# Patient Record
Sex: MALE | Race: WHITE | NOT HISPANIC OR LATINO | Employment: OTHER | URBAN - METROPOLITAN AREA
[De-identification: names, ages, dates, MRNs, and addresses within clinical notes are randomized per-mention and may not be internally consistent; named-entity substitution may affect disease eponyms.]

---

## 2017-02-23 ENCOUNTER — ALLSCRIPTS OFFICE VISIT (OUTPATIENT)
Dept: OTHER | Facility: OTHER | Age: 62
End: 2017-02-23

## 2017-07-27 ENCOUNTER — HOSPITAL ENCOUNTER (EMERGENCY)
Facility: HOSPITAL | Age: 62
Discharge: HOME | End: 2017-07-27
Payer: MEDICARE

## 2017-07-27 ENCOUNTER — APPOINTMENT (EMERGENCY)
Dept: RADIOLOGY | Facility: HOSPITAL | Age: 62
End: 2017-07-27
Payer: MEDICARE

## 2017-07-27 VITALS
RESPIRATION RATE: 18 BRPM | TEMPERATURE: 97.6 F | SYSTOLIC BLOOD PRESSURE: 145 MMHG | HEART RATE: 68 BPM | DIASTOLIC BLOOD PRESSURE: 87 MMHG | OXYGEN SATURATION: 100 % | HEIGHT: 73 IN | WEIGHT: 261 LBS | BODY MASS INDEX: 34.59 KG/M2

## 2017-07-27 DIAGNOSIS — M25.532 LEFT WRIST PAIN: Primary | ICD-10-CM

## 2017-07-27 LAB
ALBUMIN SERPL BCP-MCNC: 3.9 G/DL (ref 3.5–5)
ALP SERPL-CCNC: 101 U/L (ref 46–116)
ALT SERPL W P-5'-P-CCNC: 19 U/L (ref 12–78)
ANION GAP SERPL CALCULATED.3IONS-SCNC: 7 MMOL/L (ref 4–13)
AST SERPL W P-5'-P-CCNC: 18 U/L (ref 5–45)
BASOPHILS # BLD AUTO: 0.02 THOUSANDS/ΜL (ref 0–0.1)
BASOPHILS NFR BLD AUTO: 0 % (ref 0–1)
BILIRUB SERPL-MCNC: 0.4 MG/DL (ref 0.2–1)
BUN SERPL-MCNC: 15 MG/DL (ref 5–25)
CALCIUM SERPL-MCNC: 8.9 MG/DL (ref 8.3–10.1)
CHLORIDE SERPL-SCNC: 105 MMOL/L (ref 100–108)
CO2 SERPL-SCNC: 28 MMOL/L (ref 21–32)
CREAT SERPL-MCNC: 0.98 MG/DL (ref 0.6–1.3)
DEPRECATED D DIMER PPP: 457 NG/ML (FEU) (ref 0–424)
EOSINOPHIL # BLD AUTO: 0.09 THOUSAND/ΜL (ref 0–0.61)
EOSINOPHIL NFR BLD AUTO: 1 % (ref 0–6)
ERYTHROCYTE [DISTWIDTH] IN BLOOD BY AUTOMATED COUNT: 13.3 % (ref 11.6–15.1)
GFR SERPL CREATININE-BSD FRML MDRD: 83 ML/MIN/1.73SQ M
GLUCOSE SERPL-MCNC: 92 MG/DL (ref 65–140)
HCT VFR BLD AUTO: 44.4 % (ref 36.5–49.3)
HGB BLD-MCNC: 15.3 G/DL (ref 12–17)
LYMPHOCYTES # BLD AUTO: 2.17 THOUSANDS/ΜL (ref 0.6–4.47)
LYMPHOCYTES NFR BLD AUTO: 22 % (ref 14–44)
MCH RBC QN AUTO: 32.6 PG (ref 26.8–34.3)
MCHC RBC AUTO-ENTMCNC: 34.5 G/DL (ref 31.4–37.4)
MCV RBC AUTO: 95 FL (ref 82–98)
MONOCYTES # BLD AUTO: 1.11 THOUSAND/ΜL (ref 0.17–1.22)
MONOCYTES NFR BLD AUTO: 11 % (ref 4–12)
NEUTROPHILS # BLD AUTO: 6.4 THOUSANDS/ΜL (ref 1.85–7.62)
NEUTS SEG NFR BLD AUTO: 66 % (ref 43–75)
PLATELET # BLD AUTO: 192 THOUSANDS/UL (ref 149–390)
PMV BLD AUTO: 10.1 FL (ref 8.9–12.7)
POTASSIUM SERPL-SCNC: 4.2 MMOL/L (ref 3.5–5.3)
PROT SERPL-MCNC: 7.5 G/DL (ref 6.4–8.2)
RBC # BLD AUTO: 4.7 MILLION/UL (ref 3.88–5.62)
SODIUM SERPL-SCNC: 140 MMOL/L (ref 136–145)
URATE SERPL-MCNC: 6.4 MG/DL (ref 4.2–8)
WBC # BLD AUTO: 9.79 THOUSAND/UL (ref 4.31–10.16)

## 2017-07-27 PROCEDURE — 85025 COMPLETE CBC W/AUTO DIFF WBC: CPT | Performed by: PHYSICIAN ASSISTANT

## 2017-07-27 PROCEDURE — 85379 FIBRIN DEGRADATION QUANT: CPT | Performed by: PHYSICIAN ASSISTANT

## 2017-07-27 PROCEDURE — 86618 LYME DISEASE ANTIBODY: CPT | Performed by: PHYSICIAN ASSISTANT

## 2017-07-27 PROCEDURE — 99283 EMERGENCY DEPT VISIT LOW MDM: CPT

## 2017-07-27 PROCEDURE — 73110 X-RAY EXAM OF WRIST: CPT

## 2017-07-27 PROCEDURE — 80053 COMPREHEN METABOLIC PANEL: CPT | Performed by: PHYSICIAN ASSISTANT

## 2017-07-27 PROCEDURE — 84550 ASSAY OF BLOOD/URIC ACID: CPT | Performed by: PHYSICIAN ASSISTANT

## 2017-07-27 PROCEDURE — 36415 COLL VENOUS BLD VENIPUNCTURE: CPT | Performed by: PHYSICIAN ASSISTANT

## 2017-07-27 RX ORDER — LEVOTHYROXINE SODIUM 0.15 MG/1
150 TABLET ORAL DAILY
COMMUNITY

## 2017-07-27 RX ORDER — ATORVASTATIN CALCIUM 80 MG/1
80 TABLET, FILM COATED ORAL DAILY
COMMUNITY
End: 2018-08-09 | Stop reason: ALTCHOICE

## 2017-07-27 RX ORDER — DIAZEPAM 10 MG/1
5 TABLET ORAL DAILY
COMMUNITY
End: 2018-02-16 | Stop reason: SDUPTHER

## 2017-07-27 RX ORDER — PROPRANOLOL HYDROCHLORIDE 80 MG/1
80 TABLET ORAL DAILY
COMMUNITY

## 2017-07-28 LAB
B BURGDOR IGG SER IA-ACNC: 0.14
B BURGDOR IGM SER IA-ACNC: 0.21

## 2017-08-24 ENCOUNTER — ALLSCRIPTS OFFICE VISIT (OUTPATIENT)
Dept: OTHER | Facility: OTHER | Age: 62
End: 2017-08-24

## 2018-01-03 ENCOUNTER — OFFICE VISIT (OUTPATIENT)
Dept: URGENT CARE | Facility: CLINIC | Age: 63
End: 2018-01-03
Payer: MEDICARE

## 2018-01-03 ENCOUNTER — HOSPITAL ENCOUNTER (EMERGENCY)
Facility: HOSPITAL | Age: 63
Discharge: HOME | End: 2018-01-03
Attending: EMERGENCY MEDICINE | Admitting: EMERGENCY MEDICINE
Payer: MEDICARE

## 2018-01-03 VITALS
HEART RATE: 79 BPM | OXYGEN SATURATION: 95 % | RESPIRATION RATE: 18 BRPM | TEMPERATURE: 98.9 F | WEIGHT: 240 LBS | BODY MASS INDEX: 31.66 KG/M2 | DIASTOLIC BLOOD PRESSURE: 64 MMHG | SYSTOLIC BLOOD PRESSURE: 108 MMHG

## 2018-01-03 DIAGNOSIS — J11.1 INFLUENZA-LIKE ILLNESS: Primary | ICD-10-CM

## 2018-01-03 LAB
ANION GAP SERPL CALCULATED.3IONS-SCNC: 5 MMOL/L (ref 4–13)
BASOPHILS # BLD AUTO: 0.01 THOUSANDS/ΜL (ref 0–0.1)
BASOPHILS NFR BLD AUTO: 0 % (ref 0–1)
BUN SERPL-MCNC: 13 MG/DL (ref 5–25)
CALCIUM SERPL-MCNC: 8.9 MG/DL (ref 8.3–10.1)
CHLORIDE SERPL-SCNC: 105 MMOL/L (ref 100–108)
CLARITY, POC: CLEAR
CO2 SERPL-SCNC: 32 MMOL/L (ref 21–32)
COLOR, POC: YELLOW
CREAT SERPL-MCNC: 1.15 MG/DL (ref 0.6–1.3)
EOSINOPHIL # BLD AUTO: 0.06 THOUSAND/ΜL (ref 0–0.61)
EOSINOPHIL NFR BLD AUTO: 1 % (ref 0–6)
ERYTHROCYTE [DISTWIDTH] IN BLOOD BY AUTOMATED COUNT: 13.4 % (ref 11.6–15.1)
EXT BILIRUBIN, UA: NORMAL
EXT BLOOD URINE: NORMAL
EXT GLUCOSE, UA: NORMAL
EXT KETONES: NORMAL
EXT NITRITE, UA: NORMAL
EXT PH, UA: 6
EXT PROTEIN, UA: NORMAL
EXT SPECIFIC GRAVITY, UA: 1.01
EXT UROBILINOGEN: NORMAL
GFR SERPL CREATININE-BSD FRML MDRD: 69 ML/MIN/1.73SQ M
GLUCOSE SERPL-MCNC: 120 MG/DL (ref 65–140)
HCT VFR BLD AUTO: 43.9 % (ref 36.5–49.3)
HGB BLD-MCNC: 14.8 G/DL (ref 12–17)
LYMPHOCYTES # BLD AUTO: 1.04 THOUSANDS/ΜL (ref 0.6–4.47)
LYMPHOCYTES NFR BLD AUTO: 14 % (ref 14–44)
MCH RBC QN AUTO: 32.3 PG (ref 26.8–34.3)
MCHC RBC AUTO-ENTMCNC: 33.7 G/DL (ref 31.4–37.4)
MCV RBC AUTO: 96 FL (ref 82–98)
MONOCYTES # BLD AUTO: 1.34 THOUSAND/ΜL (ref 0.17–1.22)
MONOCYTES NFR BLD AUTO: 17 % (ref 4–12)
NEUTROPHILS # BLD AUTO: 5.28 THOUSANDS/ΜL (ref 1.85–7.62)
NEUTS SEG NFR BLD AUTO: 68 % (ref 43–75)
PLATELET # BLD AUTO: 159 THOUSANDS/UL (ref 149–390)
PMV BLD AUTO: 10.1 FL (ref 8.9–12.7)
POTASSIUM SERPL-SCNC: 4.1 MMOL/L (ref 3.5–5.3)
RBC # BLD AUTO: 4.58 MILLION/UL (ref 3.88–5.62)
SODIUM SERPL-SCNC: 142 MMOL/L (ref 136–145)
WBC # BLD AUTO: 7.73 THOUSAND/UL (ref 4.31–10.16)
WBC # BLD EST: NORMAL 10*3/UL

## 2018-01-03 PROCEDURE — 99213 OFFICE O/P EST LOW 20 MIN: CPT

## 2018-01-03 PROCEDURE — 99283 EMERGENCY DEPT VISIT LOW MDM: CPT

## 2018-01-03 PROCEDURE — 81002 URINALYSIS NONAUTO W/O SCOPE: CPT | Performed by: EMERGENCY MEDICINE

## 2018-01-03 PROCEDURE — 87798 DETECT AGENT NOS DNA AMP: CPT | Performed by: EMERGENCY MEDICINE

## 2018-01-03 PROCEDURE — 94640 AIRWAY INHALATION TREATMENT: CPT

## 2018-01-03 PROCEDURE — 96361 HYDRATE IV INFUSION ADD-ON: CPT

## 2018-01-03 PROCEDURE — 36415 COLL VENOUS BLD VENIPUNCTURE: CPT | Performed by: EMERGENCY MEDICINE

## 2018-01-03 PROCEDURE — 85025 COMPLETE CBC W/AUTO DIFF WBC: CPT | Performed by: EMERGENCY MEDICINE

## 2018-01-03 PROCEDURE — 96374 THER/PROPH/DIAG INJ IV PUSH: CPT

## 2018-01-03 PROCEDURE — 80048 BASIC METABOLIC PNL TOTAL CA: CPT | Performed by: EMERGENCY MEDICINE

## 2018-01-03 PROCEDURE — G0463 HOSPITAL OUTPT CLINIC VISIT: HCPCS

## 2018-01-03 RX ORDER — ATORVASTATIN CALCIUM 80 MG/1
1 TABLET, FILM COATED ORAL DAILY
COMMUNITY
End: 2018-01-03 | Stop reason: SDUPTHER

## 2018-01-03 RX ORDER — ALBUTEROL SULFATE 2.5 MG/3ML
5 SOLUTION RESPIRATORY (INHALATION) ONCE
Status: COMPLETED | OUTPATIENT
Start: 2018-01-03 | End: 2018-01-03

## 2018-01-03 RX ORDER — KETOROLAC TROMETHAMINE 30 MG/ML
15 INJECTION, SOLUTION INTRAMUSCULAR; INTRAVENOUS ONCE
Status: COMPLETED | OUTPATIENT
Start: 2018-01-03 | End: 2018-01-03

## 2018-01-03 RX ORDER — LEVOTHYROXINE SODIUM 0.12 MG/1
1 TABLET ORAL DAILY
COMMUNITY
End: 2018-01-03 | Stop reason: SDUPTHER

## 2018-01-03 RX ORDER — PROPRANOLOL HYDROCHLORIDE 80 MG/1
TABLET ORAL DAILY
COMMUNITY
End: 2018-01-03 | Stop reason: SDUPTHER

## 2018-01-03 RX ORDER — DIAZEPAM 10 MG/1
1 TABLET ORAL DAILY
COMMUNITY
End: 2018-01-03 | Stop reason: SDUPTHER

## 2018-01-03 RX ADMIN — IPRATROPIUM BROMIDE 0.5 MG: 0.5 SOLUTION RESPIRATORY (INHALATION) at 20:36

## 2018-01-03 RX ADMIN — SODIUM CHLORIDE 1000 ML: 0.9 INJECTION, SOLUTION INTRAVENOUS at 20:31

## 2018-01-03 RX ADMIN — ALBUTEROL SULFATE 5 MG: 2.5 SOLUTION RESPIRATORY (INHALATION) at 20:36

## 2018-01-03 RX ADMIN — KETOROLAC TROMETHAMINE 15 MG: 30 INJECTION, SOLUTION INTRAMUSCULAR at 20:35

## 2018-01-04 LAB
FLUAV AG SPEC QL: ABNORMAL
FLUBV AG SPEC QL: DETECTED
RSV B RNA SPEC QL NAA+PROBE: ABNORMAL

## 2018-01-04 NOTE — DISCHARGE INSTRUCTIONS
Influenza   WHAT YOU NEED TO KNOW:   Influenza (the flu) is an infection caused by the influenza virus  The flu is easily spread when an infected person coughs, sneezes, or has close contact with others  You may be able to spread the flu to others for 1 week or longer after signs or symptoms appear  DISCHARGE INSTRUCTIONS:   Call 911 for any of the following:   · You have trouble breathing, and your lips look purple or blue  · You have a seizure  Return to the emergency department if:   · You are dizzy, or you are urinating less or not at all  · You have a headache with a stiff neck, and you feel tired or confused  · You have new pain or pressure in your chest     · Your symptoms, such as shortness of breath, vomiting, or diarrhea, get worse  · Your symptoms, such as fever and coughing, seem to get better, but then get worse  Contact your healthcare provider if:   · You have new muscle pain or weakness  · You have questions or concerns about your condition or care  Medicines: You may need any of the following:  · Acetaminophen  decreases pain and fever  It is available without a doctor's order  Ask how much to take and how often to take it  Follow directions  Acetaminophen can cause liver damage if not taken correctly  · NSAIDs , such as ibuprofen, help decrease swelling, pain, and fever  This medicine is available with or without a doctor's order  NSAIDs can cause stomach bleeding or kidney problems in certain people  If you take blood thinner medicine, always ask your healthcare provider if NSAIDs are safe for you  Always read the medicine label and follow directions  · Antivirals  help fight a viral infection  · Take your medicine as directed  Contact your healthcare provider if you think your medicine is not helping or if you have side effects  Tell him or her if you are allergic to any medicine  Keep a list of the medicines, vitamins, and herbs you take   Include the amounts, and when and why you take them  Bring the list or the pill bottles to follow-up visits  Carry your medicine list with you in case of an emergency  Rest  as much as you can to help you recover  Drink liquids as directed  to help prevent dehydration  Ask how much liquid to drink each day and which liquids are best for you  Prevent the spread of influenza:   · Wash your hands often  Use soap and water  Wash your hands after you use the bathroom, change a child's diapers, or sneeze  Wash your hands before you prepare or eat food  Use gel hand cleanser when soap and water are not available  Do not touch your eyes, nose, or mouth unless you have washed your hands first            · Cover your mouth when you sneeze or cough  Cough into a tissue or the bend of your arm  · Clean shared items with a germ-killing   Clean table surfaces, doorknobs, and light switches  Do not share towels, silverware, and dishes with people who are sick  Wash bed sheets, towels, silverware, and dishes with soap and water  · Wear a mask  over your mouth and nose if you are sick or are near anyone who is sick  · Stay away from others  if you are sick  · Influenza vaccine  helps prevent influenza (flu)  Everyone older than 6 months should get a yearly influenza vaccine  Get the vaccine as soon as it is available, usually in September or October each year  Follow up with your healthcare provider as directed:  Write down your questions so you remember to ask them during your visits  © 2017 [employer] [employer] Information is for End User's use only and may not be sold, redistributed or otherwise used for commercial purposes  All illustrations and images included in CareNotes® are the copyrighted property of [employer] , [employer]  or [employer]  The above information is an  only  It is not intended as medical advice for individual conditions or treatments   Talk to your doctor, nurse or pharmacist before following any medical regimen to see if it is safe and effective for you

## 2018-01-04 NOTE — ED PROVIDER NOTES
History  Chief Complaint   Patient presents with    URI     Cough, body aches, chest congestion, nasal congestion, sore throat, coughing fits causing SOB since yesterday  Seen at [facility] earlier today and dx with viral syndrome  This 82-year-old man with history of essential tremor with deep brain stimulator, level IB melanoma excision 5 years ago, in remission, hyperlipidemia, thyroid disease and obesity complains of sudden onset fever, myalgia, cephalgia, sore throat and cough productive of clear sputum since yesterday  Prior to Admission Medications   Prescriptions Last Dose Informant Patient Reported? Taking? albuterol (5 mg/mL) 0 5 % nebulizer solution   Yes No   Sig: Take 2 5 mg by nebulization every 6 (six) hours as needed for wheezing   atorvastatin (LIPITOR) 80 mg tablet   Yes No   Sig: Take 80 mg by mouth daily   diazepam (VALIUM) 10 mg tablet   Yes No   Sig: Take 5 mg by mouth daily   levothyroxine 150 mcg tablet   Yes No   Sig: Take 150 mcg by mouth daily   propranolol (INDERAL) 80 mg tablet   Yes No   Sig: Take 80 mg by mouth daily      Facility-Administered Medications: None       Past Medical History:   Diagnosis Date    Disease of thyroid gland     Enlarged prostate     Hemorrhoids     Hyperlipidemia     Melanoma ([address] )     Obesity     Tremor        Past Surgical History:   Procedure Laterality Date    INSERTION / PLACEMENT / REVISION NEUROSTIMULATOR      SKIN SURGERY         History reviewed  No pertinent family history  I have reviewed and agree with the history as documented  Social History   Substance Use Topics    Smoking status: Never Smoker    Smokeless tobacco: Not on file    Alcohol use Yes      Comment: social        Review of Systems   Constitutional:        See  HPI   HENT: Positive for congestion, rhinorrhea and sore throat  Negative for drooling, ear discharge, ear pain, facial swelling, hearing loss, nosebleeds and trouble swallowing      Eyes: Negative  Respiratory: Positive for cough and shortness of breath  Negative for wheezing and stridor  Cardiovascular: Negative  Gastrointestinal: Negative  Endocrine: Negative  Genitourinary: Negative  Musculoskeletal: Positive for myalgias  Skin: Negative  Neurological: Positive for tremors  Negative for dizziness, seizures, weakness, light-headedness, numbness and headaches  Hematological: Negative  Psychiatric/Behavioral: Negative  Physical Exam  ED Triage Vitals   Temperature Pulse Respirations Blood Pressure SpO2   01/03/18 2052 01/03/18 2030 01/03/18 2052 01/03/18 2030 01/03/18 2030   98 9 °F (37 2 °C) 89 18 128/61 95 %      Temp Source Heart Rate Source Patient Position - Orthostatic VS BP Location FiO2 (%)   01/03/18 2052 01/03/18 2052 01/03/18 2052 01/03/18 2052 --   Temporal Monitor Sitting Right arm       Pain Score       01/03/18 2052       6           Orthostatic Vital Signs  Vitals:    01/03/18 2030 01/03/18 2045 01/03/18 2052 01/03/18 2100   BP: 128/61  128/61 123/62   Pulse: 89 85 84 85   Patient Position - Orthostatic VS:   Sitting Lying       Physical Exam   Constitutional: He is oriented to person, place, and time  He appears well-developed and well-nourished  No distress  HENT:   Head: Normocephalic and atraumatic  Right Ear: External ear normal    Left Ear: External ear normal    Mouth/Throat: Oropharynx is clear and moist    Eyes: Conjunctivae and EOM are normal  Pupils are equal, round, and reactive to light  Neck: Normal range of motion  Neck supple  No JVD present  Cardiovascular: Normal rate, regular rhythm, normal heart sounds and intact distal pulses  No murmur heard  Pulmonary/Chest: Effort normal and breath sounds normal  No stridor  No respiratory distress  Abdominal: Soft  Bowel sounds are normal  He exhibits no mass  There is no tenderness  There is no rebound and no guarding  Musculoskeletal: Normal range of motion   He exhibits no edema or tenderness  Lymphadenopathy:     He has no cervical adenopathy  Neurological: He is alert and oriented to person, place, and time  He has normal reflexes  No cranial nerve deficit  Coordination normal    Skin: Skin is warm and dry  Capillary refill takes less than 2 seconds  No rash noted  He is not diaphoretic  Psychiatric: He has a normal mood and affect  His behavior is normal    Nursing note and vitals reviewed        ED Medications  Medications   sodium chloride 0 9 % bolus 1,000 mL (0 mL Intravenous Stopped 1/3/18 2209)   ketorolac (TORADOL) injection 15 mg (15 mg Intravenous Given 1/3/18 2035)   albuterol inhalation solution 5 mg (5 mg Nebulization Given 1/3/18 2036)   ipratropium (ATROVENT) 0 02 % inhalation solution 0 5 mg (0 5 mg Nebulization Given 1/3/18 2036)       Diagnostic Studies  Results Reviewed     Procedure Component Value Units Date/Time    POCT urinalysis dipstick [[id number]]  (Normal) Resulted:  01/03/18 2217    Lab Status:  Final result Specimen:  Urine Updated:  01/03/18 2217     Color, UA yellow     Clarity, UA clear     EXT Glucose, UA neg     EXT Bilirubin, UA (Ref: Negative) NEG     EXT Ketones, UA (Ref: Negative) NEG     EXT Spec Grav, UA 1 010     EXT Blood, UA (Ref: Negative) TRACE HEMOLYZED     EXT pH, UA 6 0     EXT Protein, UA (Ref: Negative) NEG     EXT Urobilinogen, UA (Ref: 0 2- 1 0) NEG     EXT Leukocytes, UA (Ref: Negative) NEG     EXT Nitrite, UA (Ref: Negative) NEG    Basic metabolic panel [[id number]] Collected:  01/03/18 2027    Lab Status:  Final result Specimen:  Blood from Arm, Left Updated:  01/03/18 2048     Sodium 142 mmol/L      Potassium 4 1 mmol/L      Chloride 105 mmol/L      CO2 32 mmol/L      Anion Gap 5 mmol/L      BUN 13 mg/dL      Creatinine 1 15 mg/dL      Glucose 120 mg/dL      Calcium 8 9 mg/dL      eGFR 69 ml/min/1 73sq m     Narrative:         National Kidney Disease Education Program recommendations are as follows:  GFR calculation is accurate only with a steady state creatinine  Chronic Kidney disease less than 60 ml/min/1 73 sq  meters  Kidney failure less than 15 ml/min/1 73 sq  meters  CBC and differential [[id number]]  (Abnormal) Collected:  01/03/18 2027    Lab Status:  Final result Specimen:  Blood from Arm, Left Updated:  01/03/18 2038     WBC 7 73 Thousand/uL      RBC 4 58 Million/uL      Hemoglobin 14 8 g/dL      Hematocrit 43 9 %      MCV 96 fL      MCH 32 3 pg      MCHC 33 7 g/dL      RDW 13 4 %      MPV 10 1 fL      Platelets 122 Thousands/uL      Neutrophils Relative 68 %      Lymphocytes Relative 14 %      Monocytes Relative 17 (H) %      Eosinophils Relative 1 %      Basophils Relative 0 %      Neutrophils Absolute 5 28 Thousands/µL      Lymphocytes Absolute 1 04 Thousands/µL      Monocytes Absolute 1 34 (H) Thousand/µL      Eosinophils Absolute 0 06 Thousand/µL      Basophils Absolute 0 01 Thousands/µL     Influenza A/B and RSV by PCR (indicated for patients >2 mo of age) [[id number]] Collected:  01/03/18 2027    Lab Status: In process Specimen:  Nasopharyngeal from Nasopharyngeal Swab Updated:  01/03/18 2034                 No orders to display              Procedures  Procedures       Phone Contacts  ED Phone Contact    ED Course  ED Course as of Jan 03 2225 Wed Jan 03, 2018 2209  Patient states he feels better  Lungs remain clear with good air entry bilaterally  Ambulating to the bathroom with no problem  Hemodynamically stable  2219   PATIENT FEELS BETTER  MUCH LESS COUGHING    I REVIEWED RESULTS IMPRESSION AND PLAN WITH THE PATIENT AND HIS WIFE                                 MDM  Number of Diagnoses or Management Options  Influenza-like illness: new and requires workup     Amount and/or Complexity of Data Reviewed  Clinical lab tests: ordered and reviewed      CritCare Time    Disposition  Final diagnoses:   Influenza-like illness     Time reflects when diagnosis was documented in both MDM as applicable and the Disposition within this note     Time User Action Codes Description Comment    1/3/2018 10:24 PM [address] Influenza-like illness       ED Disposition     ED Disposition Condition Comment    Discharge  Vonda  discharge to home/self care  Condition at discharge: Stable        Follow-up Information     Follow up With Specialties Details Why Contact Info    GARIMA Cordoba Nurse Practitioner In 1 week As needed [phone]  [phone]          Patient's Medications   Discharge Prescriptions    No medications on file     No discharge procedures on file      ED Provider  Electronically Signed by           Michael Hess DO  01/03/18 2226

## 2018-01-04 NOTE — PROGRESS NOTES
Assessment   1  Viral URI (465 9) (J06 9,B97 89)   2  Cough (786 2) (R05)    Plan   Cough    · Promethazine-Codeine 6 25-10 MG/5ML Oral Syrup; 2 teaspoons at bedtime as    needed for cough   · Levalbuterol HCl - 1 25 MG/3ML Inhalation Nebulization Solution    (Xopenex)   · Sodium Chloride 0 9 % Inhalation Nebulization Solution    Discussion/Summary   Discussion Summary:    Continue Flonase   cough medication as needed as needed   no improvement with nebulizer treatment which is not surprising given his cough is due to postnasal drip   follow-up with PCP  Medication Side Effects Reviewed: Possible side effects of new medications were reviewed with the patient/guardian today  Understands and agrees with treatment plan: The treatment plan was reviewed with the patient/guardian  The patient/guardian understands and agrees with the treatment plan      Chief Complaint   1  Cold Symptoms  Chief Complaint Free Text Note Form: Pt reports yesterday he developed a productive cough with clear sputum, sore throat, headache and chills  He used his albuterol inhaler without improvement  History of Present Illness   HPI: Patient began yesterday with increasing sinus congestion and dry persistent cough  He states he was not able to sleep last night due to the persistent coughing  No fever no chills, no chest tightness shortness of breath or wheezing  patient reports using a ProAir inhaler to help with his cough was not effective  He and his wife are requesting a neb treatment    Hospital Based Practices Required Assessment:      Pain Assessment      the patient states they do not have pain  Abuse And Domestic Violence Screen   Domestic violence screen not done today  Reason DV Screen not done: spouse present       Depression And Suicide Screen  Suicide screen not done today  -- Reason suicide screen not done: spouse present  Prefered Language is  [place]  Primary Language is  English        Review of Systems   Focused-Male:      Constitutional: no fever or chills, feels well, no tiredness, no recent weight loss or weight gain  ENT: as noted in HPI  Cardiovascular: no complaints of slow or fast heart rate, no chest pain, no palpitations, no leg claudication or lower extremity edema  Respiratory: cough, but-- as noted in HPI  Active Problems   1  Benign familial tremor (333 1) (G25 0)   2  Follow-up examination following surgery (V67 00) (Z09)   3  S/P deep brain stimulator placement (V45 89) (Z96 89)    Past Medical History   1  History of hypercholesterolemia (V12 29) (Z86 39)   2  History of hypothyroidism (V12 29) (Z86 39)    Family History   Mother    1  Family history of Mother  At Age ___  Father    2  Family history of Father  At Age ___    Social History    · Being A Social Drinker   · Living Independently With Spouse   · Marital History - Currently    · Never A Smoker   · Occupation:    Current Meds    1  Atorvastatin Calcium 80 MG Oral Tablet; TAKE 1 TABLET DAILY; Therapy: (Recorded:2014) to Recorded   2  DiazePAM 10 MG Oral Tablet; TAKE 1 TABLET DAILY; Last Rx:2017 Ordered   3  Levothyroxine Sodium 125 MCG Oral Tablet; TAKE 1 TABLET DAILY; Therapy: ([id number]) to Recorded   4  ProAir HFA AERS; Therapy: ([id number]) to Recorded   5  Propranolol HCl - 80 MG Oral Tablet; TAKE 1 PO QD; Therapy: (Recorded:2014) to Recorded    Allergies   1  Demerol SOLN    Vitals   Signs   Recorded: [id number] 09:05AM   Temperature: 98 1 F  Heart Rate: 78  Respiration: 16  Systolic: 952  Diastolic: 58  O2 Saturation: 96    Physical Exam        Constitutional      General appearance: No acute distress, well appearing and well nourished  Eyes      Conjunctiva and lids: No swelling, erythema, or discharge  Pupils and irises: Equal, round and reactive to light         Ears, Nose, Mouth, and Throat      External inspection of ears and nose: Normal        Otoscopic examination: Tympanic membrance translucent with normal light reflex  Canals patent without erythema  Nasal mucosa, septum, and turbinates: Abnormal  -- Mucosal erythema, edema and rhinorrhea  Oropharynx: Normal with no erythema, edema, exudate or lesions  Pulmonary      Respiratory effort: No increased work of breathing or signs of respiratory distress  Auscultation of lungs: Clear to auscultation  Cardiovascular      Auscultation of heart: Normal rate and rhythm, normal S1 and S2, without murmurs  Lymphatic      Palpation of lymph nodes in neck: No lymphadenopathy         Future Appointments      Date/Time Provider Specialty Site   05/31/2018 11:00 AM Amrita Arreaga MD Neurology [place] [id number]     Signatures    Electronically signed by : Kirt Sanchez DO; [place]  3 2018 12:11PM EST                       (Author)

## 2018-01-04 NOTE — ED NOTES
This patient was originally registered in error - pt was brought back directly from registration to the treatment area (triage had to then be redone on this new chart)     Maliha Hernandez RN  01/03/18 2056

## 2018-01-14 VITALS
HEIGHT: 73 IN | WEIGHT: 282 LBS | DIASTOLIC BLOOD PRESSURE: 72 MMHG | SYSTOLIC BLOOD PRESSURE: 128 MMHG | HEART RATE: 77 BPM | BODY MASS INDEX: 37.37 KG/M2

## 2018-01-23 VITALS
SYSTOLIC BLOOD PRESSURE: 110 MMHG | OXYGEN SATURATION: 96 % | DIASTOLIC BLOOD PRESSURE: 58 MMHG | RESPIRATION RATE: 16 BRPM | TEMPERATURE: 98.1 F | HEART RATE: 78 BPM

## 2018-02-16 DIAGNOSIS — G25.0 BENIGN FAMILIAL TREMOR: Primary | ICD-10-CM

## 2018-02-16 RX ORDER — DIAZEPAM 10 MG/1
10 TABLET ORAL DAILY
Qty: 90 TABLET | Refills: 0 | OUTPATIENT
Start: 2018-02-16 | End: 2018-05-21 | Stop reason: SDUPTHER

## 2018-05-21 DIAGNOSIS — G25.0 BENIGN FAMILIAL TREMOR: ICD-10-CM

## 2018-05-22 RX ORDER — DIAZEPAM 10 MG/1
10 TABLET ORAL DAILY
Qty: 90 TABLET | Refills: 0 | Status: SHIPPED | OUTPATIENT
Start: 2018-05-22 | End: 2018-08-22 | Stop reason: SDUPTHER

## 2018-07-17 ENCOUNTER — TELEPHONE (OUTPATIENT)
Dept: NEUROLOGY | Facility: CLINIC | Age: 63
End: 2018-07-17

## 2018-07-17 DIAGNOSIS — Z96.89 STATUS POST DEEP BRAIN STIMULATOR PLACEMENT: Primary | ICD-10-CM

## 2018-07-30 ENCOUNTER — APPOINTMENT (OUTPATIENT)
Dept: LAB | Facility: HOSPITAL | Age: 63
End: 2018-07-30
Attending: NEUROLOGICAL SURGERY
Payer: MEDICARE

## 2018-07-30 ENCOUNTER — HOSPITAL ENCOUNTER (OUTPATIENT)
Dept: NON INVASIVE DIAGNOSTICS | Facility: HOSPITAL | Age: 63
Discharge: HOME | End: 2018-07-30
Attending: NEUROLOGICAL SURGERY
Payer: MEDICARE

## 2018-07-30 ENCOUNTER — TELEPHONE (OUTPATIENT)
Dept: NEUROSURGERY | Facility: CLINIC | Age: 63
End: 2018-07-30

## 2018-07-30 ENCOUNTER — OFFICE VISIT (OUTPATIENT)
Dept: NEUROSURGERY | Facility: CLINIC | Age: 63
End: 2018-07-30
Payer: MEDICARE

## 2018-07-30 VITALS
SYSTOLIC BLOOD PRESSURE: 131 MMHG | TEMPERATURE: 97.2 F | RESPIRATION RATE: 16 BRPM | BODY MASS INDEX: 36.45 KG/M2 | WEIGHT: 275 LBS | HEIGHT: 73 IN | DIASTOLIC BLOOD PRESSURE: 77 MMHG | HEART RATE: 67 BPM

## 2018-07-30 DIAGNOSIS — G25.0 BENIGN ESSENTIAL TREMOR: ICD-10-CM

## 2018-07-30 DIAGNOSIS — Z96.89 STATUS POST DEEP BRAIN STIMULATOR PLACEMENT: ICD-10-CM

## 2018-07-30 DIAGNOSIS — G25.0 BENIGN ESSENTIAL TREMOR: Primary | ICD-10-CM

## 2018-07-30 LAB
ALBUMIN SERPL BCP-MCNC: 3.9 G/DL (ref 3.5–5)
ALP SERPL-CCNC: 85 U/L (ref 46–116)
ALT SERPL W P-5'-P-CCNC: 26 U/L (ref 12–78)
ANION GAP SERPL CALCULATED.3IONS-SCNC: 6 MMOL/L (ref 4–13)
APTT PPP: 25 SECONDS (ref 24–36)
AST SERPL W P-5'-P-CCNC: 22 U/L (ref 5–45)
ATRIAL RATE: 0 BPM
BASOPHILS # BLD AUTO: 0.03 THOUSANDS/ΜL (ref 0–0.1)
BASOPHILS NFR BLD AUTO: 0 % (ref 0–1)
BILIRUB SERPL-MCNC: 0.8 MG/DL (ref 0.2–1)
BILIRUB UR QL STRIP: NEGATIVE
BUN SERPL-MCNC: 16 MG/DL (ref 5–25)
CALCIUM SERPL-MCNC: 9.4 MG/DL (ref 8.3–10.1)
CHLORIDE SERPL-SCNC: 107 MMOL/L (ref 100–108)
CLARITY UR: CLEAR
CO2 SERPL-SCNC: 30 MMOL/L (ref 21–32)
COLOR UR: YELLOW
CREAT SERPL-MCNC: 0.98 MG/DL (ref 0.6–1.3)
EOSINOPHIL # BLD AUTO: 0.08 THOUSAND/ΜL (ref 0–0.61)
EOSINOPHIL NFR BLD AUTO: 1 % (ref 0–6)
ERYTHROCYTE [DISTWIDTH] IN BLOOD BY AUTOMATED COUNT: 13.1 % (ref 11.6–15.1)
EST. AVERAGE GLUCOSE BLD GHB EST-MCNC: 103 MG/DL
GFR SERPL CREATININE-BSD FRML MDRD: 83 ML/MIN/1.73SQ M
GLUCOSE P FAST SERPL-MCNC: 94 MG/DL (ref 65–99)
GLUCOSE UR STRIP-MCNC: NEGATIVE MG/DL
HBA1C MFR BLD: 5.2 % (ref 4.2–6.3)
HCT VFR BLD AUTO: 43.9 % (ref 36.5–49.3)
HGB BLD-MCNC: 14.5 G/DL (ref 12–17)
HGB UR QL STRIP.AUTO: NEGATIVE
IMM GRANULOCYTES # BLD AUTO: 0.03 THOUSAND/UL (ref 0–0.2)
IMM GRANULOCYTES NFR BLD AUTO: 0 % (ref 0–2)
INR PPP: 0.97 (ref 0.86–1.17)
KETONES UR STRIP-MCNC: NEGATIVE MG/DL
LEUKOCYTE ESTERASE UR QL STRIP: NEGATIVE
LYMPHOCYTES # BLD AUTO: 1.95 THOUSANDS/ΜL (ref 0.6–4.47)
LYMPHOCYTES NFR BLD AUTO: 25 % (ref 14–44)
MCH RBC QN AUTO: 31.9 PG (ref 26.8–34.3)
MCHC RBC AUTO-ENTMCNC: 33 G/DL (ref 31.4–37.4)
MCV RBC AUTO: 97 FL (ref 82–98)
MONOCYTES # BLD AUTO: 0.83 THOUSAND/ΜL (ref 0.17–1.22)
MONOCYTES NFR BLD AUTO: 11 % (ref 4–12)
NEUTROPHILS # BLD AUTO: 4.89 THOUSANDS/ΜL (ref 1.85–7.62)
NEUTS SEG NFR BLD AUTO: 63 % (ref 43–75)
NITRITE UR QL STRIP: NEGATIVE
NRBC BLD AUTO-RTO: 0 /100 WBCS
PH UR STRIP.AUTO: 5.5 [PH] (ref 4.5–8)
PLATELET # BLD AUTO: 211 THOUSANDS/UL (ref 149–390)
PMV BLD AUTO: 10 FL (ref 8.9–12.7)
POTASSIUM SERPL-SCNC: 4.9 MMOL/L (ref 3.5–5.3)
PROT SERPL-MCNC: 7.4 G/DL (ref 6.4–8.2)
PROT UR STRIP-MCNC: NEGATIVE MG/DL
PROTHROMBIN TIME: 12.3 SECONDS (ref 11.8–14.2)
QRS AXIS: -7 DEGREES
QRSD INTERVAL: 180 MS
QT INTERVAL: 540 MS
QTC INTERVAL: 530 MS
RBC # BLD AUTO: 4.55 MILLION/UL (ref 3.88–5.62)
SODIUM SERPL-SCNC: 143 MMOL/L (ref 136–145)
SP GR UR STRIP.AUTO: >=1.03 (ref 1–1.03)
T WAVE AXIS: 17 DEGREES
UROBILINOGEN UR QL STRIP.AUTO: 0.2 E.U./DL
VENTRICULAR RATE: 58 BPM
WBC # BLD AUTO: 7.81 THOUSAND/UL (ref 4.31–10.16)

## 2018-07-30 PROCEDURE — 85730 THROMBOPLASTIN TIME PARTIAL: CPT

## 2018-07-30 PROCEDURE — 83036 HEMOGLOBIN GLYCOSYLATED A1C: CPT

## 2018-07-30 PROCEDURE — 95978 PR ANALYZE NEUROSTIM BRAIN, FIRST 1H: CPT | Performed by: NEUROLOGICAL SURGERY

## 2018-07-30 PROCEDURE — 85025 COMPLETE CBC W/AUTO DIFF WBC: CPT

## 2018-07-30 PROCEDURE — 93010 ELECTROCARDIOGRAM REPORT: CPT | Performed by: INTERNAL MEDICINE

## 2018-07-30 PROCEDURE — 36415 COLL VENOUS BLD VENIPUNCTURE: CPT

## 2018-07-30 PROCEDURE — 93005 ELECTROCARDIOGRAM TRACING: CPT

## 2018-07-30 PROCEDURE — 81003 URINALYSIS AUTO W/O SCOPE: CPT | Performed by: NEUROLOGICAL SURGERY

## 2018-07-30 PROCEDURE — 99203 OFFICE O/P NEW LOW 30 MIN: CPT | Performed by: NEUROLOGICAL SURGERY

## 2018-07-30 PROCEDURE — 85610 PROTHROMBIN TIME: CPT

## 2018-07-30 PROCEDURE — 80053 COMPREHEN METABOLIC PANEL: CPT

## 2018-07-30 RX ORDER — ALBUTEROL SULFATE 90 UG/1
AEROSOL, METERED RESPIRATORY (INHALATION)
COMMUNITY
End: 2021-09-15

## 2018-07-30 NOTE — PROGRESS NOTES
Assessment/Plan:    No problem-specific Assessment & Plan notes found for this encounter  Diagnoses and all orders for this visit:    Benign essential tremor  -     Case request operating room: REPLACEMENT left chest IMPLANTABLE PULSE GENERATOR (IPG) DEEP BRAIN STIMULATION (DBS); Standing  -     Case request operating room: REPLACEMENT left chest IMPLANTABLE PULSE GENERATOR (IPG) DEEP BRAIN STIMULATION (DBS)  -     UA (URINE) with reflex to Microscopic  -     Comprehensive metabolic panel; Future  -     CBC and differential; Future  -     APTT; Future  -     Protime-INR; Future  -     HEMOGLOBIN A1C W/ EAG ESTIMATION; Future  -     EKG 12 lead; Future    Status post deep brain stimulator placement  -     Ambulatory referral to Neurosurgery  -     UA (URINE) with reflex to Microscopic  -     Comprehensive metabolic panel; Future  -     CBC and differential; Future  -     APTT; Future  -     Protime-INR; Future  -     HEMOGLOBIN A1C W/ EAG ESTIMATION; Future  -     EKG 12 lead; Future    Other orders  -     albuterol (PROAIR HFA) 90 mcg/act inhaler; Inhale      Summary: This is a 21-year-old male with essential tremor in a DBS since 2000 with great relief  He is approximately 4 years since his last replacement  He presents again at end of service  His device demonstrates ADEOLA 2 57  Underwent programming adjustment to decrease voltage to prolong life span  We will proceed with replacement  The risks and benefits of the procedure reviewed with the patient and he wishes to proceed  These risks include, but are not limited to bleeding, infection, device malfunction, device malpositioning, and failure therapy  Once again discussion was held that he is a candidate for right-sided DBS placement for his progressing left-sided tremor  He reports he may be interested in this in the near future  Subjective:      Patient ID: Kushal Harrison is a 66 y o  male      HPI   This is a very pleasant 21-year-old male with a history of a essential tremor  Underwent placement of a left-sided deep brain stimulator for his right-sided tremor in 2000 by Dr Francisco J Encarnacion at [facility]  He obtained great relief with the DBS  He is followed by Dr Zulma Prince for programming  He is now approximately 4 years from his last left-sided generator replacement  He is presenting once again at end of service  He has a mild progression of tremor in his left hand  The following portions of the patient's history were reviewed and updated as appropriate: allergies, current medications, past family history, past medical history, past social history and past surgical history  Review of Systems   Constitutional: Negative  HENT: Negative  Eyes: Negative  Respiratory:        Hx of asthma   Cardiovascular: Negative  Gastrointestinal: Negative  Endocrine: Negative  Genitourinary: Negative  Musculoskeletal: Positive for back pain  Skin: Negative  Allergic/Immunologic: Negative  Neurological: Positive for tremors (DBS placed for ET, Tremor mainly bilateral hands, slight tremor with head)  Hematological: Negative  Psychiatric/Behavioral: Negative  Objective:      /77 (BP Location: Left arm, Patient Position: Sitting, Cuff Size: Standard)   Pulse 67   Temp (!) 97 2 °F (36 2 °C) (Tympanic)   Resp 16   Ht 6' 1" (1 854 m)   Wt 125 kg (275 lb)   BMI 36 28 kg/m²          Physical Exam   Constitutional: He is oriented to person, place, and time  He appears well-developed  HENT:   Head: Normocephalic  Eyes: Pupils are equal, round, and reactive to light  Neck: Normal range of motion  Pulmonary/Chest: Effort normal    Musculoskeletal: Normal range of motion  Neurological: He is alert and oriented to person, place, and time  He has normal strength  No sensory deficit  Mild left upper extremity intention tremor         Results:  Left-sided VIM at ADEOLA 2 57    Programming at case positive and contact 0- at 3 1/90/185

## 2018-07-30 NOTE — TELEPHONE ENCOUNTER
Allergies: yes   Signed surgical consent after Dr Estrellita Aaron explained procedure in detail--DBS IPG replacement   Comorbid medical conditions ; none   Cardiac MI Stents CHF---denies   Pulmonary COPD , asthma MERCEDEZ use CPAP or Bipap--denies   Endocrine - DM   Personal history of venous thromboembolic disease;--denies   Discussed overview of surgical process from office appointment thru 6 weeks post op  Imagining; NA   Pain management;    Prescribes opiates--NA   Patient verbalized and understanding

## 2018-08-01 ENCOUNTER — TELEPHONE (OUTPATIENT)
Dept: NEUROLOGY | Facility: CLINIC | Age: 63
End: 2018-08-01

## 2018-08-01 NOTE — TELEPHONE ENCOUNTER
Pt states he got a reminder call that he has an appt tomorrow, he is questioning if he should even come in for this, he has his DBS surgery on 8/17th, and then was told that he should see Dr Arlene Grimes 2 weeks after

## 2018-08-09 NOTE — PRE-PROCEDURE INSTRUCTIONS
Pre-Surgery Instructions:   Medication Instructions    albuterol (5 mg/mL) 0 5 % nebulizer solution Instructed patient per Anesthesia Guidelines   albuterol (PROAIR HFA) 90 mcg/act inhaler Instructed patient per Anesthesia Guidelines   diazepam (VALIUM) 10 mg tablet Instructed patient per Anesthesia Guidelines   levothyroxine 150 mcg tablet Instructed patient per Anesthesia Guidelines   propranolol (INDERAL) 80 mg tablet Instructed patient per Anesthesia Guidelines   SIMVASTATIN PO Instructed patient per Anesthesia Guidelines  Pre-procedure instructions given without any further  questions or concerns at this time  Pt reports he has CHG wash and will review the written instructions from Dr Marc Lennon office prior to use

## 2018-08-16 ENCOUNTER — TELEPHONE (OUTPATIENT)
Dept: NEUROSURGERY | Facility: CLINIC | Age: 63
End: 2018-08-16

## 2018-08-16 ENCOUNTER — DOCUMENTATION (OUTPATIENT)
Dept: NEUROSURGERY | Facility: CLINIC | Age: 63
End: 2018-08-16

## 2018-08-16 DIAGNOSIS — G89.18 POSTOPERATIVE PAIN: Primary | ICD-10-CM

## 2018-08-16 DIAGNOSIS — Z98.890 STATUS POST SURGERY: ICD-10-CM

## 2018-08-16 RX ORDER — CEPHALEXIN 500 MG/1
CAPSULE ORAL
Qty: 28 CAPSULE | Refills: 0 | Status: SHIPPED | OUTPATIENT
Start: 2018-08-17 | End: 2018-08-24

## 2018-08-16 NOTE — TELEPHONE ENCOUNTER
Attempted to contact patient on 3 phone numbers received VM     Will order ABX, no pain medication   Will call later today

## 2018-08-17 ENCOUNTER — ANESTHESIA EVENT (OUTPATIENT)
Dept: PERIOP | Facility: HOSPITAL | Age: 63
End: 2018-08-17
Payer: MEDICARE

## 2018-08-17 ENCOUNTER — TELEPHONE (OUTPATIENT)
Dept: NEUROSURGERY | Facility: CLINIC | Age: 63
End: 2018-08-17

## 2018-08-17 ENCOUNTER — HOSPITAL ENCOUNTER (OUTPATIENT)
Facility: HOSPITAL | Age: 63
Setting detail: OUTPATIENT SURGERY
Discharge: HOME | End: 2018-08-17
Attending: NEUROLOGICAL SURGERY | Admitting: NEUROLOGICAL SURGERY
Payer: MEDICARE

## 2018-08-17 ENCOUNTER — ANESTHESIA (OUTPATIENT)
Dept: PERIOP | Facility: HOSPITAL | Age: 63
End: 2018-08-17
Payer: MEDICARE

## 2018-08-17 VITALS
SYSTOLIC BLOOD PRESSURE: 135 MMHG | WEIGHT: 260 LBS | HEART RATE: 60 BPM | TEMPERATURE: 97.7 F | DIASTOLIC BLOOD PRESSURE: 81 MMHG | BODY MASS INDEX: 34.46 KG/M2 | RESPIRATION RATE: 18 BRPM | HEIGHT: 73 IN | OXYGEN SATURATION: 98 %

## 2018-08-17 PROCEDURE — 61885 INSRT/REDO NEUROSTIM 1 ARRAY: CPT | Performed by: NEUROLOGICAL SURGERY

## 2018-08-17 PROCEDURE — C1787 PATIENT PROGR, NEUROSTIM: HCPCS | Performed by: NEUROLOGICAL SURGERY

## 2018-08-17 PROCEDURE — 95978 PR ANALYZE NEUROSTIM BRAIN, FIRST 1H: CPT | Performed by: NEUROLOGICAL SURGERY

## 2018-08-17 PROCEDURE — C1767 GENERATOR, NEURO NON-RECHARG: HCPCS | Performed by: NEUROLOGICAL SURGERY

## 2018-08-17 DEVICE — INS 37602 ACTIVA SC NO PARYLN EMN DBSALP
Type: IMPLANTABLE DEVICE | Site: CHEST | Status: NON-FUNCTIONAL
Brand: ACTIVA® SC
Removed: 2022-04-14

## 2018-08-17 RX ORDER — SODIUM CHLORIDE, SODIUM LACTATE, POTASSIUM CHLORIDE, CALCIUM CHLORIDE 600; 310; 30; 20 MG/100ML; MG/100ML; MG/100ML; MG/100ML
INJECTION, SOLUTION INTRAVENOUS CONTINUOUS PRN
Status: DISCONTINUED | OUTPATIENT
Start: 2018-08-17 | End: 2018-08-17 | Stop reason: SURG

## 2018-08-17 RX ORDER — FENTANYL CITRATE 50 UG/ML
INJECTION, SOLUTION INTRAMUSCULAR; INTRAVENOUS AS NEEDED
Status: DISCONTINUED | OUTPATIENT
Start: 2018-08-17 | End: 2018-08-17 | Stop reason: SURG

## 2018-08-17 RX ORDER — MIDAZOLAM HYDROCHLORIDE 1 MG/ML
INJECTION INTRAMUSCULAR; INTRAVENOUS AS NEEDED
Status: DISCONTINUED | OUTPATIENT
Start: 2018-08-17 | End: 2018-08-17 | Stop reason: SURG

## 2018-08-17 RX ORDER — PROPOFOL 10 MG/ML
INJECTION, EMULSION INTRAVENOUS AS NEEDED
Status: DISCONTINUED | OUTPATIENT
Start: 2018-08-17 | End: 2018-08-17 | Stop reason: SURG

## 2018-08-17 RX ORDER — FENTANYL CITRATE/PF 50 MCG/ML
25 SYRINGE (ML) INJECTION
Status: DISCONTINUED | OUTPATIENT
Start: 2018-08-17 | End: 2018-08-17 | Stop reason: HOSPADM

## 2018-08-17 RX ORDER — CHLORHEXIDINE GLUCONATE 0.12 MG/ML
15 RINSE ORAL ONCE
Status: COMPLETED | OUTPATIENT
Start: 2018-08-17 | End: 2018-08-17

## 2018-08-17 RX ORDER — ONDANSETRON 2 MG/ML
4 INJECTION INTRAMUSCULAR; INTRAVENOUS EVERY 6 HOURS PRN
Status: DISCONTINUED | OUTPATIENT
Start: 2018-08-17 | End: 2018-08-17 | Stop reason: HOSPADM

## 2018-08-17 RX ORDER — DIPHENHYDRAMINE HYDROCHLORIDE 50 MG/ML
12.5 INJECTION INTRAMUSCULAR; INTRAVENOUS ONCE AS NEEDED
Status: DISCONTINUED | OUTPATIENT
Start: 2018-08-17 | End: 2018-08-17 | Stop reason: HOSPADM

## 2018-08-17 RX ORDER — OXYCODONE HYDROCHLORIDE AND ACETAMINOPHEN 5; 325 MG/1; MG/1
TABLET ORAL
Qty: 32 TABLET | Refills: 0 | Status: SHIPPED | OUTPATIENT
Start: 2018-08-17 | End: 2018-11-08 | Stop reason: ALTCHOICE

## 2018-08-17 RX ORDER — ONDANSETRON 2 MG/ML
4 INJECTION INTRAMUSCULAR; INTRAVENOUS ONCE AS NEEDED
Status: DISCONTINUED | OUTPATIENT
Start: 2018-08-17 | End: 2018-08-17 | Stop reason: HOSPADM

## 2018-08-17 RX ORDER — OXYCODONE HYDROCHLORIDE AND ACETAMINOPHEN 5; 325 MG/1; MG/1
2 TABLET ORAL EVERY 4 HOURS PRN
Status: DISCONTINUED | OUTPATIENT
Start: 2018-08-17 | End: 2018-08-17 | Stop reason: HOSPADM

## 2018-08-17 RX ADMIN — CEFAZOLIN SODIUM 2000 MG: 2 SOLUTION INTRAVENOUS at 13:20

## 2018-08-17 RX ADMIN — OXYCODONE HYDROCHLORIDE AND ACETAMINOPHEN 1 TABLET: 5; 325 TABLET ORAL at 15:43

## 2018-08-17 RX ADMIN — FENTANYL CITRATE 50 MCG: 50 INJECTION, SOLUTION INTRAMUSCULAR; INTRAVENOUS at 13:30

## 2018-08-17 RX ADMIN — MIDAZOLAM HYDROCHLORIDE 2 MG: 1 INJECTION, SOLUTION INTRAMUSCULAR; INTRAVENOUS at 13:20

## 2018-08-17 RX ADMIN — CHLORHEXIDINE GLUCONATE 0.12% ORAL RINSE 15 ML: 1.2 LIQUID ORAL at 12:27

## 2018-08-17 RX ADMIN — PROPOFOL 30 MG: 10 INJECTION, EMULSION INTRAVENOUS at 13:30

## 2018-08-17 RX ADMIN — FENTANYL CITRATE 50 MCG: 50 INJECTION, SOLUTION INTRAMUSCULAR; INTRAVENOUS at 13:20

## 2018-08-17 RX ADMIN — SODIUM CHLORIDE, SODIUM LACTATE, POTASSIUM CHLORIDE, AND CALCIUM CHLORIDE: .6; .31; .03; .02 INJECTION, SOLUTION INTRAVENOUS at 13:20

## 2018-08-17 NOTE — ANESTHESIA PREPROCEDURE EVALUATION
Review of Systems/Medical History          Cardiovascular  EKG reviewed, Exercise tolerance (METS): >4,  Hyperlipidemia,    Pulmonary       GI/Hepatic            Endo/Other  History of thyroid disease ,      GYN       Hematology   Musculoskeletal       Neurology    Neuromuscular disease , CNS neoplasm ,    Psychology           Physical Exam    Airway    Mallampati score: II  TM Distance: >3 FB  Neck ROM: full     Dental   No notable dental hx     Cardiovascular  Rhythm: regular, Rate: normal, Cardiovascular exam normal    Pulmonary  Pulmonary exam normal     Other Findings        Anesthesia Plan  ASA Score- 3     Anesthesia Type- IV sedation with anesthesia with ASA Monitors  Additional Monitors:   Airway Plan:         Plan Factors-    Induction-     Postoperative Plan- Plan for postoperative opioid use  Informed Consent- Anesthetic plan and risks discussed with patient  I personally reviewed this patient with the CRNA  Discussed and agreed on the Anesthesia Plan with the CRNA  Jose Cronin

## 2018-08-17 NOTE — DISCHARGE INSTRUCTIONS
Follow Up Dr Andrez Alcala 2 weeks  Remove Dressing 3 days  Antibiotics and Pain prescription at [facility] INSTRUCTIONS    · No strenous activities for 3 days following surgery  · You can remove the dressing in 3 days  · You may shower in 3 days  · You will be given a prescription for one week of post-operative antibiotics  Please take them as directed  · Please set up a two-week follow-up appointment with our office  · Contact our office if you experience any of the following after your surgery:       Skin around the incision feels warm to the touch; there is redness, swelling,   drainage, or bleeding from the incision site  You are experiencing a fever over 101 degrees  · If any questions arise after your procedure, do not hesitate to call our office at [phone]

## 2018-08-17 NOTE — OP NOTE
OPERATIVE REPORT  PATIENT NAME: Dwaine Oleary    :  1955  MRN: [id number]  Pt Location: [facility] OR ROOM 01    SURGERY DATE: 2018    Surgeon(s) and Role:     * Jeronimo Mercado MD - Primary     * Vaughn Lindsay PA-C - Assisting  No qualified resident available for exposure  PA Present throughout procedure  Assisted with exposure and wound closure providing essential assistance throughout including retraction and hemostasis to achieve the necessary surgical goal       Preop Diagnosis:  Benign essential tremor [G25 0]    Post-Op Diagnosis Codes: * Benign essential tremor [G25 0]      Specimen(s):  * No specimens in log *    Estimated Blood Loss:   Minimal    Drains:       Anesthesia Type:   IV Sedation with Anesthesia    Operative Indications:  Benign essential tremor [N37 9]      Complications:   None    Procedure and Technique:  1  Replacement of left chest deep brain stimulator single channel electrode array implantable pulse  generator     2  Electronic analysis and complex programming of deep brain    stimulator system in the postoperative period for approximately 1    hour         Indication  This is a 33-year-old male with a long-standing history of tremor who underwent previous placement of a deep brain stimulator system who now presents at end-of-life of the generator  The risks and benefits of replacement were reviewed with the patient and family they wished to proceed  IMPLANTS/COMPONENTS    Med[employer]onic Activa SC 03565, left chest, serial #[id number]       The removed device was an Activa SC 40404, serial [id number]  Findings  At the time of procedure, impedances and system check returned within    normal limits  Postoperative programming included Left ViM at case positive, contact 0 negative, running at 3 1 v,  pulse width of 90, and a rate of 185 Hz  Operative Technique  The patient was identified and brought to the operating room   They    underwent the induction of MAC anesthesia  Placed supine on the    operating room table, prepped and draped in the usual sterile fashion     Then 2 grams of Ancef administered as antibiotic prophylaxis     Bilateral lower extremity sequential compression devices were placed    for deep vein thrombosis prophylaxis and a time-out was then    performed     Prior left chest incision was anesthetized using 1% lidocaine with    epinephrine  It was incised with a 10 blade  Electrocautery dissected    the subcutaneous tissue using sharp and blunt dissection  Previously    placed generator was freed from the underlying pocket and     from the distal portion of the lead  The new single channel    electrode generator was then connected to the distal portion of the    Lead and secured  Both the access lead and IPG were then placed into    the pocket and secured with 2-0 silk suture  The system was    interrogated and working within normal limits     Incision was copiously irrigated with antibiotic-induced solution  The    pocket was closed over the generator with an interrupted 2-0 Vicryl    plus suture, with the skin being approximated with an interrupted,    inverted 2-0 Vicryl plus suture with stainless steel staples for the    skin  Routine sterile dressings were then applied       At the end of the case, all counts were reported to be correct  There    were no breaks in surgical technique or complications encountered    during the procedure   The patient awoke from anesthesia without    difficulty and in stable condition being taken to the recovery room       In the postoperative period, the patient underwent electronic analysis    and complex programming of the deep brain stimulator system for    approximately one hour with the settings as mentioned in the findings    section        I was present for the entire procedure    Patient Disposition:  PACU     SIGNATURE: Beth Shelton MD  DATE: August 17, 2018  TIME: 1:44 PM

## 2018-08-17 NOTE — INTERIM OP NOTE
REPLACEMENT left chest DEEP BRAIN STIMULATION GENERATOR  Postoperative Note  PATIENT NAME: Geovanny Bailey  : 1955  MRN: [id number]  [facility] ROOM 01    Surgery Date: 2018    Preop Diagnosis:  Benign essential tremor [G25 0]    Post-Op Diagnosis Codes:      * Benign essential tremor [G25 0]    Procedure(s) (LRB):  REPLACEMENT left chest DEEP BRAIN STIMULATION GENERATOR (Left)    Surgeon(s) and Role:     * Claudia Crow MD - Primary     * Urszula De Los Santos PA-C - Assisting    Specimens:  * No specimens in log *    Estimated Blood Loss:   Minimal    Anesthesia Type:   IV Sedation with Anesthesia     Findings:    Impedances    Complications:   None    SIGNATURE: Claudia Crow MD   DATE: 2018   TIME: 1:44 PM

## 2018-08-17 NOTE — TELEPHONE ENCOUNTER
Received suzi form [facility] , patient ready fr d/c received my message last evening , his pain level is a 5/10 continued with postoperative pain will need more than tylenol for a few days    Spoke with patient while in [facility], verified allergies and pharmacy  Escript  entered for Percocet--

## 2018-08-21 ENCOUNTER — TELEPHONE (OUTPATIENT)
Dept: NEUROSURGERY | Facility: CLINIC | Age: 63
End: 2018-08-21

## 2018-08-21 NOTE — TELEPHONE ENCOUNTER
Post operative call s/p surgery on  8/17 w/ DR Ghulam Mendes s/p ;replacement DBS IPG   Post operative pain: controlled with tylenol has not required percocet    Antibiotic: cephalexin started same day as surgery postop   Gastrointestinal (BM, NVD,  Appetite /Fluid intake),   --denies problems                   Call if you develop any signs or symptoms of incision (s) redness, drainage, dehiscence, or  fever of 101 or higher , uncontrolled nausea and /or vomiting --reinforced    Wound/dressing; as per postoperative discharge instructions remove dressings on Monday   Post operative Hygiene: Gently wash surgical incision(s) with a clean wash cloth and pat dry using a clean wash towel  Reinforced use clean wash cloth and towel daily, use antibacterial soap, change bed linens 1-2 times per week and pajamas several times per week  --reinforced    Post operative Activity:  Ambulate as tolerate, Lift no greater than 10 LBS until 6 weeks, Avoid submersion in water x 3 weeks, and refrain for bending or twisting until about 4 weeks -light duty until then   --reinforced   No NSAID (aspirin, Motrin, ibuprofen, OTC, supplements etc ) for 2 weeks, may resume after 2 week postoperative visit -----reinforced   Post Operative Visits: 2 week changed to 9/4 --patient reports he is leaving for vacation early next week going to the [facility] ----reinforced and reviewed in depth  Hygiene care for incision and full body showers with antibacterial body wash, clean towel and wash cloth    reinforced no lifting greater than 10 lbs using left upper extremity  ( left chest IPG replacement) , avoid repetitive stretching  motion of the left upper extremity/     Patent verbalized an understanding of information communicated

## 2018-08-22 DIAGNOSIS — G25.0 BENIGN FAMILIAL TREMOR: ICD-10-CM

## 2018-08-24 RX ORDER — DIAZEPAM 10 MG/1
10 TABLET ORAL DAILY
Qty: 90 TABLET | Refills: 0 | Status: SHIPPED | OUTPATIENT
Start: 2018-08-24 | End: 2018-11-28 | Stop reason: SDUPTHER

## 2018-08-24 NOTE — TELEPHONE ENCOUNTER
Pt called asking status of the refill  He states that he's going on vac this afternoon  Pls sign of rx Valium request if agreeable       Thanks          [phone]

## 2018-09-04 ENCOUNTER — OFFICE VISIT (OUTPATIENT)
Dept: NEUROSURGERY | Facility: CLINIC | Age: 63
End: 2018-09-04

## 2018-09-04 VITALS
SYSTOLIC BLOOD PRESSURE: 125 MMHG | HEIGHT: 73 IN | WEIGHT: 269 LBS | TEMPERATURE: 98 F | DIASTOLIC BLOOD PRESSURE: 80 MMHG | BODY MASS INDEX: 35.65 KG/M2 | HEART RATE: 67 BPM

## 2018-09-04 DIAGNOSIS — Z48.02: Primary | ICD-10-CM

## 2018-09-04 PROCEDURE — 99024 POSTOP FOLLOW-UP VISIT: CPT | Performed by: PHYSICIAN ASSISTANT

## 2018-09-04 NOTE — PROGRESS NOTES
Assessment/Plan:     Diagnoses and all orders for this visit:    Encounter for removal of staples          Discussion / Summary: This is a 66 y o  male with benign essential tremor who presents for routine post-op follow up incision check s/p replacement of left chest deep brain stimulator single channel electrode array implantable pulse generator preformed on 8/17/18 by Dr Martell Townsend for EOL of DBS generator  Patients left chest incision is healing well  Staples were removed from left chest incision without event  Discussed with patient routine incision care and post-op activities  Recommend:   Keep incision clean and dry  May shower with gentle antibacterial soap and water  Pat incision dry after showering with clean towel  Refrain from placing ointments, oils, or lotions on surgical incision  May leave incision open to air  Refrain from submerging surgical incision (ie  no baths, no swimming) until scar is maturely healed / no scabs  He may gradually increase his routine activity although caution against any strenuous lifting with left arm for the first 6 weeks post-op  He may gradually increase walking as tolerated from neurosurgical standpoint  Recommend he follow up with his established Neurologist ( Dr Bandar Cunningham) for DBS programming / DBS management  Patient is to notify our office or present to Emergency Room if experience incision concern (redness, swelling, drainage, opening/gapping), fevers, worsening pain, or other neurological change  Patient was offered to have a follow up appointment arranged with Dr Martell Townsend to discuss possible placement of right sided DBS system for left sided tremor but patient declined follow up appointment with Dr Martell Townsend at this juncture  Mr Talita Camargo wishes to discuss right sided DBS further with his Neurologist (Dr Bandar Cunningham) and patient will contact our office if he wishes to arrange follow up with Dr Martell Townsend      Further neurosurgical follow up with our office otherwise would be on an as needed basis  Patient advised to follow up with his PCP for evaluation/management of reports of two episodes of blood in urine  Patient expressed  expressed understanding and agreement   _____________________________________________________________________________________      Subjective:      Patient ID: Carlos Mireles is a 66 y o  male with benign essential tremor who presents for routine post-op follow up incision check s/p replacement of left chest deep brain stimulator single channel electrode array implantable pulse generator preformed on 8/17/18 by Dr Navjot Darby for EOL of DBS generator  HPI   Patient denies any surgical incision issues  He denies fever chills  Patient reports he completed the routine post-op antibiotic  He reported noticing a small amount of blood in urine x 2 the day after her completed antibiotic  Informed patient to follow up with his PCP for evaluation/management of reports of two episodes of blood in urine  Patient reports right-sided tremor has been better controlled since replacement of the left chest DBS generator  His left upper extremity tremor remains unchanged  He denies headaches, nausea, vomiting, seizure, visual disturbance, memory/cognitive change, sensory disturbances, or weakness  He ambulates independent  Patient reports he has follow-up with Dr Tony Kemp on 10/3/18  The following portions of the patient's history were reviewed and updated as appropriate: allergies, current medications, past family history, past medical history, past social history and past surgical history  Review of Systems   Constitutional: Negative for fever  HENT: Negative  Eyes: Negative for visual disturbance  Respiratory: Negative for shortness of breath  Cardiovascular: Negative for chest pain  Gastrointestinal: Negative for nausea and vomiting  Musculoskeletal: Negative for gait problem     Neurological: See HPI   Psychiatric/Behavioral: Negative for agitation  Objective:      /80 (BP Location: Right arm, Patient Position: Sitting, Cuff Size: Standard)   Pulse 67   Temp 98 °F (36 7 °C) (Tympanic)   Ht 6' 1" (1 854 m)   Wt 122 kg (269 lb)   BMI 35 49 kg/m²          Physical Exam      General:  Awake, alert, and oriented x 3  GCS 15   Speech is clear and fluent  Follows commands briskly  Chest:  Left chest incision is clean, dry, and intact with staples  No  erythema  No induration  No drainage  No  swelling  No ecchymosis  Eyes:  PERRLA  EOMI    Resp:  Non-labored  Neuro --  CN: PERRLA  EOMI  Face symmetric without droop  Sensation to LT intact b/l V1-V3  Masseter intact b/l  Heraing grossly intact to finger rub b/l  SCM/Trap intact and symmetric b/l  Palate elevates bilaterally  Tongue midline on protrusion bilaterally  Sensation:  Sensation  to LT intact b/l  upper extremities, torso, and lower extremities bilaterally  Musculoskeletal exam reveals: Motor:  Shoulder abduction 5/5 bilaterally  Elbow flexion/extension 5/5 bilaterally  Wrist flexion/extension 5/5 bilaterally  Finger  5/5 bilaterally  Hip flexion/abduction/adduction 5/5 bilaterally  Knee flexion/extension 5/5 bilaterally  Ankle DF/PF 5/5 bilaterally  Coordination:  No pronator drift  Finger to nose intact on right; on left touches lateral aspect of nose  No tremor of RUE  Intention tremor of left hand  Gait:  NA/NS    Independent  Procedure -- Suture Removal:   Location of surgical incision : Left chest  Surgical incision is well  healed  Wound Exam:  No erythema  No ecchymosis  No dehiscence  No drainage  No swelling   No warmth  No induration  12 staples were removed from left chest incision without event  Incision is clean, dry, and intact  Incision left open to air  Patient tolerated the procedure well  There were no complications

## 2018-09-04 NOTE — LETTER
September 5, 2018     Edin Bravo MD  [address] [facility]    Patient: Mac Stearns   YOB: 1955   Date of Visit: 9/4/2018       Dear Dr Amanda Zheng:    Thank you for referring Mac Stearns to me for evaluation  Below are my notes for this consultation  If you have questions, please do not hesitate to call me  I look forward to following your patient along with you  Sincerely,        Josefina Pratt PA-C        CC: DO Josefina Rodríguez PA-C  9/5/2018  1:14 AM  Sign at close encounter  Assessment/Plan:     Diagnoses and all orders for this visit:    Encounter for removal of staples          Discussion / Summary: This is a 66 y o  male with benign essential tremor who presents for routine post-op follow up incision check s/p replacement of left chest deep brain stimulator single channel electrode array implantable pulse generator preformed on 8/17/18 by Dr Karlie Godoy for EOL of DBS generator  Patients left chest incision is healing well  Staples were removed from left chest incision without event  Discussed with patient routine incision care and post-op activities  Recommend:   Keep incision clean and dry  May shower with gentle antibacterial soap and water  Pat incision dry after showering with clean towel  Refrain from placing ointments, oils, or lotions on surgical incision  May leave incision open to air  Refrain from submerging surgical incision (ie  no baths, no swimming) until scar is maturely healed / no scabs  He may gradually increase his routine activity although caution against any strenuous lifting with left arm for the first 6 weeks post-op  He may gradually increase walking as tolerated from neurosurgical standpoint  Recommend he follow up with his established Neurologist ( Dr Amanda Zheng) for DBS programming / DBS management       Patient is to notify our office or present to Emergency Room if experience incision concern (redness, swelling, drainage, opening/gapping), fevers, worsening pain, or other neurological change  Patient was offered to have a follow up appointment arranged with Dr Lind Fearing to discuss possible placement of right sided DBS system for left sided tremor but patient declined follow up appointment with Dr Lind Fearing at this juncture  Mr Joycelyn Woodson wishes to discuss right sided DBS further with his Neurologist (Dr Arlene Grimes) and patient will contact our office if he wishes to arrange follow up with Dr Lind Fearing  Further neurosurgical follow up with our office otherwise would be on an as needed basis  Patient advised to follow up with his PCP for evaluation/management of reports of two episodes of blood in urine  Patient expressed  expressed understanding and agreement   _____________________________________________________________________________________      Subjective:      Patient ID: yEad Geller is a 66 y o  male with benign essential tremor who presents for routine post-op follow up incision check s/p replacement of left chest deep brain stimulator single channel electrode array implantable pulse generator preformed on 8/17/18 by Dr Lind Fearing for EOL of DBS generator  HPI   Patient denies any surgical incision issues  He denies fever chills  Patient reports he completed the routine post-op antibiotic  He reported noticing a small amount of blood in urine x 2 the day after her completed antibiotic  Informed patient to follow up with his PCP for evaluation/management of reports of two episodes of blood in urine  Patient reports right-sided tremor has been better controlled since replacement of the left chest DBS generator  His left upper extremity tremor remains unchanged  He denies headaches, nausea, vomiting, seizure, visual disturbance, memory/cognitive change, sensory disturbances, or weakness  He ambulates independent      Patient reports he has follow-up with Dr Nicole Alberts on 10/3/18  The following portions of the patient's history were reviewed and updated as appropriate: allergies, current medications, past family history, past medical history, past social history and past surgical history  Review of Systems   Constitutional: Negative for fever  HENT: Negative  Eyes: Negative for visual disturbance  Respiratory: Negative for shortness of breath  Cardiovascular: Negative for chest pain  Gastrointestinal: Negative for nausea and vomiting  Musculoskeletal: Negative for gait problem  Neurological:        See HPI   Psychiatric/Behavioral: Negative for agitation  Objective:      /80 (BP Location: Right arm, Patient Position: Sitting, Cuff Size: Standard)   Pulse 67   Temp 98 °F (36 7 °C) (Tympanic)   Ht 6' 1" (1 854 m)   Wt 122 kg (269 lb)   BMI 35 49 kg/m²           Physical Exam      General:  Awake, alert, and oriented x 3  GCS 15   Speech is clear and fluent  Follows commands briskly  Chest:  Left chest incision is clean, dry, and intact with staples  No  erythema  No induration  No drainage  No  swelling  No ecchymosis  Eyes:  PERRLA  EOMI    Resp:  Non-labored  Neuro --  CN: PERRLA  EOMI  Face symmetric without droop  Sensation to LT intact b/l V1-V3  Masseter intact b/l  Heraing grossly intact to finger rub b/l  SCM/Trap intact and symmetric b/l  Palate elevates bilaterally  Tongue midline on protrusion bilaterally  Sensation:  Sensation  to LT intact b/l  upper extremities, torso, and lower extremities bilaterally  Musculoskeletal exam reveals: Motor:  Shoulder abduction 5/5 bilaterally  Elbow flexion/extension 5/5 bilaterally  Wrist flexion/extension 5/5 bilaterally  Finger  5/5 bilaterally  Hip flexion/abduction/adduction 5/5 bilaterally  Knee flexion/extension 5/5 bilaterally  Ankle DF/PF 5/5 bilaterally  Coordination:  No pronator drift   Finger to nose intact on right; on left touches lateral aspect of nose  No tremor of RUE  Intention tremor of left hand  Gait:  NA/NS    Independent  Procedure -- Suture Removal:   Location of surgical incision : Left chest  Surgical incision is well  healed  Wound Exam:  No erythema  No ecchymosis  No dehiscence  No drainage  No swelling   No warmth  No induration  12 staples were removed from left chest incision without event  Incision is clean, dry, and intact  Incision left open to air  Patient tolerated the procedure well  There were no complications

## 2018-09-04 NOTE — PATIENT INSTRUCTIONS
Keep incision clean and dry  May shower with gentle antibacterial soap and water  Pat incision dry after showering  Refrain from placing ointments, oils, or lotions on surgical incision  May leave incision open to air  Refrain from submerging surgical incision (ie  no baths, no swimming) until scar is maturely healed / no scabs  May gradually increase your routine activity from a neurosurgical standpoint although caution against any strenuous lifting with left arm for the first 6 weeks post-op  May gradually increase walking as tolerated from neurosurgical standpoint  Follow up with your established Neurologist ( Dr Eldon Saeed ) for DBS programming / DBS management  Notify our office or present to Emergency Room if experience incision concern (redness, swelling, drainage, opening/gapping), fevers, worsening pain, or other neurological change  Follow up with your Primary Care Provider for evaluation/management of reports of episodes of blood in urine

## 2018-11-08 ENCOUNTER — PROCEDURE VISIT (OUTPATIENT)
Dept: NEUROLOGY | Facility: CLINIC | Age: 63
End: 2018-11-08

## 2018-11-08 VITALS
WEIGHT: 266 LBS | SYSTOLIC BLOOD PRESSURE: 120 MMHG | DIASTOLIC BLOOD PRESSURE: 69 MMHG | HEART RATE: 72 BPM | BODY MASS INDEX: 35.25 KG/M2 | HEIGHT: 73 IN

## 2018-11-08 DIAGNOSIS — G25.0 BENIGN ESSENTIAL TREMOR: Primary | ICD-10-CM

## 2018-11-08 DIAGNOSIS — Z96.89 S/P DEEP BRAIN STIMULATOR PLACEMENT: ICD-10-CM

## 2018-11-08 NOTE — ASSESSMENT & PLAN NOTE
Tremor well controlled on right  IPG changed  Deep brain stimulator interrogated for update but no change made as patient is doing well  See attached clinical sheets for details  He is having more trouble with his left hand and is now considering right lead placement perhaps in the Spring  He is not sure  Will refer to neurosurgery to further discuss  He has concerns regarding coverage  If he decided to proceed, order for neuropsychological testing will need to be placed  Patient was videotaped with his permission

## 2018-11-08 NOTE — PROGRESS NOTES
Patient ID: Ellie Martinez is a 66 y o  male  Assessment/Plan:    Benign essential tremor  Tremor well controlled on right  IPG changed  Deep brain stimulator interrogated for update but no change made as patient is doing well  See attached clinical sheets for details  He is having more trouble with his left hand and is now considering right lead placement perhaps in the Spring  He is not sure  Will refer to neurosurgery to further discuss  He has concerns regarding coverage  If he decided to proceed, order for neuropsychological testing will need to be placed  Patient was videotaped with his permission  Diagnoses and all orders for this visit:    Benign essential tremor  -     Ambulatory referral to Neurosurgery; Future    S/P deep brain stimulator placement           Subjective:     Mr Ellie Martinez is a 66year old right-handed retired  with essential tremor s/p L VIM DBS placed 3/28/2000 in [facility] by Dr Lidia Vanessa, with L IPG replacement 6/25/02, 3/29/05, 2007, 10/2/09, and switched from [facility] to [address] [place] [address] on 12/20/11, replacement 5/6/14, 8/17/18, by Dr Billie Clark, who returns for follow up  To review, action tremor began gradually, first noted around the 9th grade  Overtime tremor has progressed  There is rest tremor  He has been on Valium 10mg daily and Inderal 80mg daily all his life  Previously on gabapentin but this was associated with sedation  Never tried topiramate or primidone  Overtime he has developed a left hand tremor but has not been interested in R lead placement  He is not interested in further medication trials for this tremor either  He is doing well  He remains on propranolol 80mg daily, and diazepam 10mg daily  He turns his device off each night  Tremor continues to be fairly controlled on the right on his current settings  Left hand tremor is getting worse  He does not use this hand for as many activities  It is starting to affect his ability to cut food   He has trouble sautering  Tremor does not interfere with drinking liquids, and buttoning  Handwriting is legible  He has a rare, am, slight head tremor  No vocal tremor  The following portions of the patient's history were reviewed and updated as appropriate: allergies, current medications, past family history, past medical history, past social history and past surgical history  Objective:    Blood pressure 120/69, pulse 72, height 6' 1" (1 854 m), weight 121 kg (266 lb)  Physical Exam   Constitutional: He appears well-developed  Eyes: EOM are normal    Neurological: He has normal strength  Psychiatric: His speech is normal    Vitals reviewed  Neurological Exam  Mental Status   Oriented to person, place, time and situation  Recent and remote memory are intact  Speech is normal  Follows complex commands  Attention and concentration are normal     Cranial Nerves  CN II: Visual fields full to confrontation  CN III, IV, VI: Extraocular movements intact bilaterally  Right pupil: Reactive to light  Left pupil: Reactive to light  CN V: Facial sensation is normal   CN VII: Full and symmetric facial movement  CN VIII: Hearing is normal   CN IX, X: Palate elevates symmetrically  CN XI: Shoulder shrug strength is normal   CN XII: Tongue midline without atrophy or fasciculations  Motor   Normal muscle tone  Strength is 5/5 throughout all four extremities  Sensory  Light touch is normal in upper and lower extremities  Coordination  Right: Finger-to-nose normal  Rapid alternating movement normal   Left: Finger-to-nose normal  Rapid alternating movement abnormality:  Moderate tremor on left FTN  No rest tremor  No tremor on right  Mild no-no head tremor while writing  No vocal tremor  Spirals very slight tremor on right  Left with tremor, unable to keep pen on paper  Handwriting normal       Gait  Casual gait is normal including stance, stride, and arm swing   Able to rise from chair without using arms  ROS:    Review of Systems   Constitutional: Negative  Negative for appetite change and fever  HENT: Negative  Negative for hearing loss, tinnitus, trouble swallowing and voice change  Eyes: Negative  Negative for photophobia and pain  Respiratory: Negative  Negative for shortness of breath  Cardiovascular: Negative  Negative for palpitations  Gastrointestinal: Negative  Negative for nausea and vomiting  Endocrine: Negative  Negative for cold intolerance and heat intolerance  Genitourinary: Negative  Negative for dysuria, frequency and urgency  Musculoskeletal: Negative  Negative for myalgias and neck pain  Skin: Negative  Negative for rash  Neurological: Positive for tremors  Negative for dizziness, seizures, syncope, facial asymmetry, speech difficulty, weakness, light-headedness, numbness and headaches  Hematological: Negative  Does not bruise/bleed easily  Psychiatric/Behavioral: Negative  Negative for confusion, hallucinations and sleep disturbance

## 2018-11-09 ENCOUNTER — TELEPHONE (OUTPATIENT)
Dept: NEUROSURGERY | Facility: CLINIC | Age: 63
End: 2018-11-09

## 2018-11-09 NOTE — TELEPHONE ENCOUNTER
I contacted patient to offer to schedule appointment to assess for right side lead placement, and he said he was unsure wether he wanted to move forward  I offered to schedule an appointment for Feb/March 2018 he did not want to and said he would call back to schedule  He also had questions about insurance coverage, I explained once he decides to move forward the surgery would need to be authorized by insurance and he would work with a surgery scheduler that would be best to ask these types of questions

## 2018-11-28 DIAGNOSIS — G25.0 BENIGN FAMILIAL TREMOR: ICD-10-CM

## 2018-11-28 RX ORDER — DIAZEPAM 10 MG/1
10 TABLET ORAL DAILY
Qty: 90 TABLET | Refills: 1 | Status: SHIPPED | OUTPATIENT
Start: 2018-11-28 | End: 2019-05-16 | Stop reason: SDUPTHER

## 2019-02-08 ENCOUNTER — OFFICE VISIT (OUTPATIENT)
Dept: URGENT CARE | Facility: CLINIC | Age: 64
End: 2019-02-08
Payer: MEDICARE

## 2019-02-08 VITALS
RESPIRATION RATE: 16 BRPM | DIASTOLIC BLOOD PRESSURE: 72 MMHG | SYSTOLIC BLOOD PRESSURE: 122 MMHG | TEMPERATURE: 97.2 F | BODY MASS INDEX: 35.12 KG/M2 | WEIGHT: 265 LBS | OXYGEN SATURATION: 94 % | HEIGHT: 73 IN | HEART RATE: 84 BPM

## 2019-02-08 DIAGNOSIS — J01.90 ACUTE SINUSITIS, RECURRENCE NOT SPECIFIED, UNSPECIFIED LOCATION: Primary | ICD-10-CM

## 2019-02-08 PROCEDURE — 99213 OFFICE O/P EST LOW 20 MIN: CPT | Performed by: EMERGENCY MEDICINE

## 2019-02-08 PROCEDURE — G0463 HOSPITAL OUTPT CLINIC VISIT: HCPCS | Performed by: EMERGENCY MEDICINE

## 2019-02-08 RX ORDER — AMOXICILLIN 875 MG/1
875 TABLET, COATED ORAL 2 TIMES DAILY
Qty: 14 TABLET | Refills: 0 | Status: SHIPPED | OUTPATIENT
Start: 2019-02-08 | End: 2019-02-15

## 2019-02-08 NOTE — PROGRESS NOTES
Assessment/Plan:    No problem-specific Assessment & Plan notes found for this encounter  Diagnoses and all orders for this visit:    Acute sinusitis, recurrence not specified, unspecified location  -     amoxicillin (AMOXIL) 875 mg tablet; Take 1 tablet (875 mg total) by mouth 2 (two) times a day for 7 days          Subjective:      Patient ID: Lashae Nelson is a 66 y o  male  Cold symptoms since yesterday, now has green sputum, sinus pressure, sore throat, chest congestion      URI    This is a new problem  The current episode started yesterday  The problem has been unchanged  There has been no fever  Associated symptoms include congestion, coughing (mild), sinus pain and a sore throat  He has tried nothing for the symptoms  The treatment provided no relief  The following portions of the patient's history were reviewed and updated as appropriate: current medications, past family history, past medical history, past social history, past surgical history and problem list     Review of Systems   HENT: Positive for congestion, sinus pain, sinus pressure and sore throat  Respiratory: Positive for cough (mild)  All other systems reviewed and are negative  Objective:      /72   Pulse 84   Temp (!) 97 2 °F (36 2 °C)   Resp 16   Ht 6' 1" (1 854 m)   Wt 120 kg (265 lb)   SpO2 94%   BMI 34 96 kg/m²          Physical Exam   Constitutional: He is oriented to person, place, and time  He appears well-developed and well-nourished  HENT:   Right Ear: External ear normal    Left Ear: External ear normal    Nose: Nose normal    Mouth/Throat: Oropharynx is clear and moist    Sinus pressure noted   Eyes: Pupils are equal, round, and reactive to light  Neck: Normal range of motion  Cardiovascular: Normal rate  Pulmonary/Chest: Effort normal    Abdominal: Soft  Neurological: He is alert and oriented to person, place, and time  Skin: Skin is warm     Psychiatric: He has a normal mood and affect   His behavior is normal  Judgment and thought content normal

## 2019-05-16 DIAGNOSIS — G25.0 BENIGN FAMILIAL TREMOR: ICD-10-CM

## 2019-05-17 RX ORDER — DIAZEPAM 10 MG/1
10 TABLET ORAL DAILY
Qty: 90 TABLET | Refills: 1 | Status: SHIPPED | OUTPATIENT
Start: 2019-05-28 | End: 2019-11-13 | Stop reason: SDUPTHER

## 2019-11-13 DIAGNOSIS — G25.0 BENIGN FAMILIAL TREMOR: ICD-10-CM

## 2019-11-13 NOTE — TELEPHONE ENCOUNTER
Pt calling in for a refill on diazepam        Call back number if needed [phone] okay to leave detailed message

## 2019-11-14 RX ORDER — DIAZEPAM 10 MG/1
10 TABLET ORAL DAILY
Qty: 90 TABLET | Refills: 1 | Status: SHIPPED | OUTPATIENT
Start: 2019-11-14 | End: 2020-05-26 | Stop reason: SDUPTHER

## 2020-05-26 DIAGNOSIS — G25.0 BENIGN FAMILIAL TREMOR: ICD-10-CM

## 2020-05-27 RX ORDER — DIAZEPAM 10 MG/1
10 TABLET ORAL DAILY
Qty: 90 TABLET | Refills: 1 | Status: SHIPPED | OUTPATIENT
Start: 2020-05-27 | End: 2020-12-14 | Stop reason: SDUPTHER

## 2020-09-02 ENCOUNTER — OFFICE VISIT (OUTPATIENT)
Dept: NEUROLOGY | Facility: CLINIC | Age: 65
End: 2020-09-02
Payer: MEDICARE

## 2020-09-02 VITALS
BODY MASS INDEX: 36.68 KG/M2 | TEMPERATURE: 98.7 F | WEIGHT: 278 LBS | SYSTOLIC BLOOD PRESSURE: 138 MMHG | HEART RATE: 70 BPM | DIASTOLIC BLOOD PRESSURE: 82 MMHG

## 2020-09-02 DIAGNOSIS — Z96.89 S/P DEEP BRAIN STIMULATOR PLACEMENT: ICD-10-CM

## 2020-09-02 DIAGNOSIS — G25.0 BENIGN ESSENTIAL TREMOR: Primary | ICD-10-CM

## 2020-09-02 PROCEDURE — 99214 OFFICE O/P EST MOD 30 MIN: CPT | Performed by: PSYCHIATRY & NEUROLOGY

## 2020-09-02 PROCEDURE — 95970 ALYS NPGT W/O PRGRMG: CPT | Performed by: PSYCHIATRY & NEUROLOGY

## 2020-09-02 RX ORDER — ATORVASTATIN CALCIUM 80 MG/1
80 TABLET, FILM COATED ORAL DAILY
COMMUNITY
Start: 2020-08-26

## 2020-09-02 NOTE — ASSESSMENT & PLAN NOTE
Tremor well controlled on right  Deep brain stimulator interrogated for update but no changes made as patient is doing well  See attached clinical sheets for details  Left hand tremor is worse and he is getting closer to deciding on right lead placement  He still is undecided and does not wish to proceed  Time spent answering questions regarding differences between DBS and MRI focused ultrasound and future DBS surgery with battery options and procedure  He will call prior to follow up should tremor worsen or he decide to proceed with right lead placement

## 2020-09-02 NOTE — PATIENT INSTRUCTIONS
Call prior to follow up should tremor worsen or you decide to proceed with surgery for left hand tremor

## 2020-09-02 NOTE — PROGRESS NOTES
Patient ID: Wil Begum is a 63 y o  male  Assessment/Plan:    Benign essential tremor  Tremor well controlled on right  Deep brain stimulator interrogated for update but no changes made as patient is doing well  See attached clinical sheets for details  Left hand tremor is worse and he is getting closer to deciding on right lead placement  He still is undecided and does not wish to proceed  Time spent answering questions regarding differences between DBS and MRI focused ultrasound and future DBS surgery with battery options and procedure  He will call prior to follow up should tremor worsen or he decide to proceed with right lead placement  Diagnoses and all orders for this visit:    Benign essential tremor    S/P deep brain stimulator placement    Other orders  -     atorvastatin (LIPITOR) 80 mg tablet; Take 80 mg by mouth daily       I have spent 25 minutes with Patient  today in which greater than 50% of this time was spent in counseling/coordination of care regarding Prognosis, Risks and benefits of tx options, Patient and family education and Impressions  Subjective:     Mr Wil Begum is a right-handed retired  with essential tremor s/p L VIM DBS placed 3/28/2000 in [place] by Dr Marcella Snider, with L IPG replacement 6/25/02, 3/29/05, 2007, 10/2/09, and switched from [facility] to [address] [place] [address] on 12/20/11, replacement 5/6/14, 8/17/18, by Dr Mario Bullock, who returns for follow up  To review, action tremor began gradually, first noted around the 9th grade  Overtime tremor has progressed  There is rest tremor  He has been on Valium 10mg daily and Inderal 80mg daily all his life  Previously on gabapentin but this was associated with sedation  Never tried topiramate or primidone  Overtime he has developed a left hand tremor but has not been interested in R lead placement  He is not interested in further medication trials for this tremor either       He remains on propranolol 80mg daily, and diazepam 10mg daily  Tremor continues to be fairly controlled on the right on his current settings  He has no issues with eating, drinking, buttoning  He can have trouble cutting meat due to left hand tremor is getting worse  He does not use this hand for as many activities but it is starting to bother him  He plans to have right VIM placement but he does not know when he wishes to have this placed  Tremor does not interfere with drinking liquids, and buttoning  Handwriting is legible  He has a rare, slight head tremor  No vocal tremor  He heard about MRI focused ultrasound and had questions  The following portions of the patient's history were reviewed and updated as appropriate: allergies, current medications, past family history, past medical history, past social history, past surgical history and problem list          Objective:    Blood pressure 138/82, pulse 70, temperature 98 7 °F (37 1 °C), weight 126 kg (278 lb)  Physical Exam  Vitals signs reviewed  Eyes:      Extraocular Movements: Extraocular movements intact  Pupils: Pupils are equal, round, and reactive to light  Pulmonary:      Effort: Pulmonary effort is normal    Musculoskeletal:      Right lower leg: No edema  Left lower leg: No edema  Neurological:      Deep Tendon Reflexes: Strength normal    Psychiatric:         Speech: Speech normal          Neurological Exam  Mental Status   Oriented to person, place, time and situation  Recent and remote memory are intact  Speech is normal  Follows complex commands  Attention and concentration are normal     Cranial Nerves  CN II: Right funduscopic exam: not visualized  Left funduscopic exam: not visualized  CN III, IV, VI: Extraocular movements intact bilaterally  Pupils equal round and reactive to light bilaterally  CN V: Facial sensation is normal   CN VII: Full and symmetric facial movement    CN VIII: Hearing is normal   CN XI: Shoulder shrug strength is normal   Cranial nerve exam limited due to masking and patient comfort       Motor   Normal muscle tone  Strength is 5/5 throughout all four extremities  Sensory  Light touch is normal in upper and lower extremities  Coordination  Right: Finger-to-nose normal  Rapid alternating movement normal   Left: Finger-to-nose abnormality: Rapid alternating movement normal   Moderate postural and action tremor on the left  No rest tremor  No tremor on the right  Gait  Casual gait is normal including stance, stride, and arm swing  Able to rise from chair without using arms  ROS:    Review of Systems   Constitutional: Negative  Negative for appetite change and fever  HENT: Negative  Negative for hearing loss, tinnitus, trouble swallowing and voice change  Eyes: Negative  Negative for photophobia and pain  Respiratory: Negative  Negative for shortness of breath  Cardiovascular: Negative  Negative for palpitations  Gastrointestinal: Negative  Negative for nausea and vomiting  Endocrine: Negative  Negative for cold intolerance  Genitourinary: Negative  Negative for dysuria, frequency and urgency  Musculoskeletal: Positive for back pain  Negative for myalgias and neck pain  Balance could be better as stated by patient     Skin: Negative  Negative for rash  Allergic/Immunologic: Negative  Neurological: Positive for tremors (Right hand)  Negative for dizziness, seizures, syncope, facial asymmetry, speech difficulty, weakness, light-headedness, numbness and headaches  Hematological: Negative  Does not bruise/bleed easily  Psychiatric/Behavioral: Negative  Negative for confusion, hallucinations and sleep disturbance  All other systems reviewed and are negative  Review of system was personally reviewed

## 2020-11-11 ENCOUNTER — OFFICE VISIT (OUTPATIENT)
Dept: URGENT CARE | Facility: CLINIC | Age: 65
End: 2020-11-11
Payer: MEDICARE

## 2020-11-11 VITALS
RESPIRATION RATE: 16 BRPM | TEMPERATURE: 98 F | DIASTOLIC BLOOD PRESSURE: 78 MMHG | OXYGEN SATURATION: 98 % | WEIGHT: 281 LBS | BODY MASS INDEX: 37.24 KG/M2 | SYSTOLIC BLOOD PRESSURE: 134 MMHG | HEART RATE: 73 BPM | HEIGHT: 73 IN
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
## 2020-11-11 PROCEDURE — 10120 INC&RMVL FB SUBQ TISS SMPL: CPT | Performed by: PREVENTIVE MEDICINE

## 2020-11-11 PROCEDURE — 99203 OFFICE O/P NEW LOW 30 MIN: CPT | Performed by: PREVENTIVE MEDICINE

## 2020-11-11 PROCEDURE — G0463 HOSPITAL OUTPT CLINIC VISIT: HCPCS | Performed by: PREVENTIVE MEDICINE

## 2020-11-11 RX ORDER — DOXYCYCLINE HYCLATE 100 MG/1
200 CAPSULE ORAL ONCE
Qty: 2 CAPSULE | Refills: 0 | Status: SHIPPED | OUTPATIENT
Start: 2020-11-11 | End: 2020-11-11

## 2020-12-14 DIAGNOSIS — G25.0 BENIGN FAMILIAL TREMOR: ICD-10-CM

## 2020-12-16 RX ORDER — DIAZEPAM 10 MG/1
10 TABLET ORAL DAILY
Qty: 90 TABLET | Refills: 1 | Status: SHIPPED | OUTPATIENT
Start: 2020-12-16 | End: 2021-06-14

## 2021-02-08 NOTE — ANESTHESIA POSTPROCEDURE EVALUATION
Post-Op Assessment Note      CV Status:  Stable    Mental Status:  Alert and awake    Hydration Status:  Euvolemic    PONV Controlled:  Controlled    Airway Patency:  Patent    Post Op Vitals Reviewed: Yes          Staff: Anesthesiologist           BP      Temp 97 9 °F (36 6 °C) (08/17/18 1354)    Pulse (P) 61 (08/17/18 1354)   Resp (P) 14 (08/17/18 1354)    SpO2 (P) 97 % (08/17/18 1354)
clear

## 2021-03-10 DIAGNOSIS — Z23 ENCOUNTER FOR IMMUNIZATION: ICD-10-CM

## 2021-03-16 ENCOUNTER — IMMUNIZATIONS (OUTPATIENT)
Dept: FAMILY MEDICINE CLINIC | Facility: HOSPITAL | Age: 66
End: 2021-03-16

## 2021-03-16 DIAGNOSIS — Z23 ENCOUNTER FOR IMMUNIZATION: Primary | ICD-10-CM

## 2021-03-16 PROCEDURE — 0001A SARS-COV-2 / COVID-19 MRNA VACCINE (PFIZER-BIONTECH) 30 MCG: CPT

## 2021-03-16 PROCEDURE — 91300 SARS-COV-2 / COVID-19 MRNA VACCINE (PFIZER-BIONTECH) 30 MCG: CPT

## 2021-04-09 ENCOUNTER — TELEPHONE (OUTPATIENT)
Dept: OTHER | Facility: OTHER | Age: 66
End: 2021-04-09

## 2021-06-11 DIAGNOSIS — G25.0 BENIGN FAMILIAL TREMOR: ICD-10-CM

## 2021-06-14 RX ORDER — DIAZEPAM 10 MG/1
TABLET ORAL
Qty: 90 TABLET | Refills: 1 | Status: SHIPPED | OUTPATIENT
Start: 2021-06-14 | End: 2022-01-03 | Stop reason: SDUPTHER

## 2021-06-17 ENCOUNTER — TELEPHONE (OUTPATIENT)
Dept: NEUROLOGY | Facility: CLINIC | Age: 66
End: 2021-06-17

## 2021-06-17 NOTE — TELEPHONE ENCOUNTER
Patient called in for refills of valium  Informed him this was electronically sent 6/14/2021  He states he has been having difficulty getting this med  Called [facility], spoke with Skyler Trimble, states that the script is coming up to soon  Last filled 3/27, states he has will not be able to fill until 6/23  Informed patient  He verbalizes understanding and denies having any other questions or concerns

## 2021-08-10 ENCOUNTER — IMMUNIZATIONS (OUTPATIENT)
Dept: FAMILY MEDICINE CLINIC | Facility: HOSPITAL | Age: 66
End: 2021-08-10

## 2021-08-10 DIAGNOSIS — Z23 ENCOUNTER FOR IMMUNIZATION: Primary | ICD-10-CM

## 2021-08-10 PROCEDURE — 0002A SARS-COV-2 / COVID-19 MRNA VACCINE (PFIZER-BIONTECH) 30 MCG: CPT

## 2021-08-10 PROCEDURE — 91300 SARS-COV-2 / COVID-19 MRNA VACCINE (PFIZER-BIONTECH) 30 MCG: CPT

## 2021-08-17 ENCOUNTER — TELEPHONE (OUTPATIENT)
Dept: UROLOGY | Facility: MEDICAL CENTER | Age: 66
End: 2021-08-17

## 2021-08-17 NOTE — TELEPHONE ENCOUNTER
Please Triage - [place] Patient-     What is the reason for the patients appointment? Patient called stating he has an elevated PSA  And also enlarged prostate  Patient would like to see [address] [place] [address] Urology  Imaging/Lab Results:      Do we accept the patient's insurance or is the patient Self-Pay? Provider & Plan: Medicare   Member ID#: Has the patient had any previous urologist(s)? Have patient records been requested? Patient will have pcp fax records [phone]  Has the patient had any outside testing done? Does the patient have a personal history of cancer?skin cancer         Patient can be reached at :[phone] (Q)

## 2021-08-18 ENCOUNTER — TELEPHONE (OUTPATIENT)
Dept: UROLOGY | Facility: MEDICAL CENTER | Age: 66
End: 2021-08-18

## 2021-08-18 NOTE — TELEPHONE ENCOUNTER
LM for patient to scheduled appointment Also asked him about his PSA as didn't see it in the system   If he called back can offer him an appointment on 8/30 @ 7:30

## 2021-08-19 NOTE — TELEPHONE ENCOUNTER
Patient called to schedule appointment   I was not able to schedule the appointment for 08/30 at 730 am   Schedule is blocked  Please call patient if appointment is still available he will take it

## 2021-08-19 NOTE — TELEPHONE ENCOUNTER
Spoke to patient to notify him of 8/30 appointment has been scheduled and it is at the [facility]

## 2021-09-02 ENCOUNTER — PROCEDURE VISIT (OUTPATIENT)
Dept: NEUROLOGY | Facility: CLINIC | Age: 66
End: 2021-09-02
Payer: MEDICARE

## 2021-09-02 VITALS
DIASTOLIC BLOOD PRESSURE: 78 MMHG | HEART RATE: 75 BPM | BODY MASS INDEX: 38.31 KG/M2 | SYSTOLIC BLOOD PRESSURE: 134 MMHG | WEIGHT: 290.4 LBS

## 2021-09-02 DIAGNOSIS — G25.0 BENIGN ESSENTIAL TREMOR: Primary | ICD-10-CM

## 2021-09-02 DIAGNOSIS — Z96.89 S/P DEEP BRAIN STIMULATOR PLACEMENT: ICD-10-CM

## 2021-09-02 PROCEDURE — 95984 ALYS BRN NPGT PRGRMG ADDL 15: CPT | Performed by: PSYCHIATRY & NEUROLOGY

## 2021-09-02 PROCEDURE — 95983 ALYS BRN NPGT PRGRMG 15 MIN: CPT | Performed by: PSYCHIATRY & NEUROLOGY

## 2021-09-02 NOTE — PROGRESS NOTES
Patient ID: America Angeles is a 61 y o  male  Assessment/Plan:    Benign essential tremor    Mild breakthrough tremor on the right  Deep brain stimulator interrogated  And slight increase in amplitude made  Tremor improved  Left Activa SC, VIM  Battery 2 79  C+0-  3 2V, PW90, 185Hz  Imp 877    Left hand tremor is worse and he is still contemplating having right lead placement  Time spent answering questions regarding DBS placement  He may be able to have one battery as he wish  He turns off his device each night  He was instructed to pay attention to patient  when turning on device each morning  He is to call if the OK switches to ADEOLA  He can call sooner should he wish to consider placement of right lead placement for left hand tremor  Will then make refer to [place] neurosurgery  Diagnoses and all orders for this visit:    Benign essential tremor    S/P deep brain stimulator placement       Spent 30 minutes on patient visit for DBS programming and counseling regarding risks, procedure and potential benefits of DBS placement of Right lead  Subjective:     Mr America Angeles is a right-handed retired  with essential tremor s/p L VIM DBS, left chest placed 3/28/2000 in [place] by Dr Samira Meadows, with multiple prior IPG replacements lasting 2-3 years, [facility] on [facility], who returns for follow up  To review, action tremor began gradually, first noted around the 9th grade  Overtime tremor has progressed  There is rest tremor  He has been on Valium 10mg daily and Inderal 80mg daily all his life  Previously on gabapentin but this was associated with sedation  Never tried topiramate or primidone  Overtime he has developed a left hand tremor but has not been interested in R lead placement  He is not interested in further medication trials for  tremor  He remains on propranolol 80mg daily, and diazepam 10mg daily  Mild breakthrough tremor on the right  Moderate tremor on the left  Tremor does not interfere with drinking liquids, and buttoning  Handwriting is legible  He has a rare, slight head tremor  No vocal tremor  Left hand tremor is more bothersome  He is still contemplating Right VIM placement  He turns of his device each night  This appears to have helped his battery life  Objective:    Blood pressure 134/78, pulse 75, weight 132 kg (290 lb 6 4 oz)  Physical Exam  Vitals reviewed  Eyes:      Extraocular Movements: Extraocular movements intact  Pupils: Pupils are equal, round, and reactive to light  Neurological:      Deep Tendon Reflexes: Strength normal    Psychiatric:         Speech: Speech normal          Neurological Exam  Mental Status   Oriented to person, place, time and situation  Recent and remote memory are intact  Speech is normal  Follows complex commands  Attention and concentration are normal     Cranial Nerves  CN II: Right funduscopic exam: not visualized  Left funduscopic exam: not visualized  CN III, IV, VI: Extraocular movements intact bilaterally  Pupils equal round and reactive to light bilaterally  CN VII: Full and symmetric facial movement  CN VIII: Hearing is normal   CN XI: Shoulder shrug strength is normal     Motor   Normal muscle tone  Strength is 5/5 throughout all four extremities  Sensory  Light touch is normal in upper and lower extremities  Coordination  Right: Finger-to-nose abnormality: Rapid alternating movement normal   Left: Finger-to-nose abnormality: Heel-to-shin normal     Rare intermittent not all head tremor  Mild intentional tremor of the right hand on finger-to-nose  Moderate tremor on left on finger-to-nose  Mild postural tremor of the left hand with hands extended  Vocal tremor or leg tremor  No rest tremor, bradykinesia, or rigidity       Gait  Casual gait is normal including stance, stride, and arm swing   Able to rise from chair without using arms         ROS:    Review of Systems  Constitutional: Negative  Negative for appetite change and fever  HENT: Negative  Negative for hearing loss, tinnitus, trouble swallowing and voice change  Eyes: Negative  Negative for photophobia and pain  Respiratory: Negative  Negative for shortness of breath  Cardiovascular: Negative  Negative for palpitations  Gastrointestinal: Negative  Negative for nausea and vomiting  Endocrine: Negative  Negative for cold intolerance  Genitourinary: Negative  Negative for dysuria, frequency and urgency  Musculoskeletal: Negative  Negative for myalgias and neck pain  Skin: Negative  Negative for rash  Allergic/Immunologic: Negative  Neurological: Positive for tremors (Hands and has stayed the same)  Negative for dizziness, seizures, syncope, facial asymmetry, speech difficulty, weakness, light-headedness, numbness and headaches  Hematological: Negative  Does not bruise/bleed easily  Psychiatric/Behavioral: Negative  Negative for confusion, hallucinations and sleep disturbance  All other systems reviewed and are negative    Review of system documented by the MA, was personally reviewed

## 2021-09-02 NOTE — PROGRESS NOTES
Review of Systems   Constitutional: Negative  Negative for appetite change and fever  HENT: Negative  Negative for hearing loss, tinnitus, trouble swallowing and voice change  Eyes: Negative  Negative for photophobia and pain  Respiratory: Negative  Negative for shortness of breath  Cardiovascular: Negative  Negative for palpitations  Gastrointestinal: Negative  Negative for nausea and vomiting  Endocrine: Negative  Negative for cold intolerance  Genitourinary: Negative  Negative for dysuria, frequency and urgency  Musculoskeletal: Negative  Negative for myalgias and neck pain  Skin: Negative  Negative for rash  Allergic/Immunologic: Negative  Neurological: Positive for tremors (Hands and has stayed the same)  Negative for dizziness, seizures, syncope, facial asymmetry, speech difficulty, weakness, light-headedness, numbness and headaches  Hematological: Negative  Does not bruise/bleed easily  Psychiatric/Behavioral: Negative  Negative for confusion, hallucinations and sleep disturbance  All other systems reviewed and are negative

## 2021-09-02 NOTE — ASSESSMENT & PLAN NOTE
Mild breakthrough tremor on the right  Deep brain stimulator interrogated  And slight increase in amplitude made  Tremor improved  Left Activa SC, VIM  Battery 2 79  C+0-  3 2V, PW90, 185Hz  Imp 877    Left hand tremor is worse and he is still contemplating having right lead placement  Time spent answering questions regarding DBS placement  He may be able to have one battery as he wish  He turns off his device each night  He was instructed to pay attention to patient  when turning on device each morning  He is to call if the OK switches to ADEOLA  He can call sooner should he wish to consider placement of right lead placement for left hand tremor  Will then make refer to [facility] neurosurgery

## 2021-09-02 NOTE — PATIENT INSTRUCTIONS
Pay attention to patient  when turning on device each morning  Call when OK switches to ADEOLA or sooner should you wish to consider placement of right lead for left hand tremor

## 2021-09-15 ENCOUNTER — OFFICE VISIT (OUTPATIENT)
Dept: UROLOGY | Facility: HOSPITAL | Age: 66
End: 2021-09-15
Payer: MEDICARE

## 2021-09-15 VITALS
DIASTOLIC BLOOD PRESSURE: 78 MMHG | HEIGHT: 73 IN | HEART RATE: 76 BPM | WEIGHT: 293 LBS | BODY MASS INDEX: 38.83 KG/M2 | SYSTOLIC BLOOD PRESSURE: 128 MMHG

## 2021-09-15 DIAGNOSIS — R82.998 OTHER ABNORMAL FINDINGS IN URINE: ICD-10-CM

## 2021-09-15 DIAGNOSIS — Z12.5 ENCOUNTER FOR SCREENING FOR MALIGNANT NEOPLASM OF PROSTATE: ICD-10-CM

## 2021-09-15 DIAGNOSIS — R39.14 BENIGN PROSTATIC HYPERPLASIA WITH INCOMPLETE BLADDER EMPTYING: ICD-10-CM

## 2021-09-15 DIAGNOSIS — N52.9 ERECTILE DYSFUNCTION, UNSPECIFIED ERECTILE DYSFUNCTION TYPE: ICD-10-CM

## 2021-09-15 DIAGNOSIS — N40.1 BENIGN PROSTATIC HYPERPLASIA WITH INCOMPLETE BLADDER EMPTYING: ICD-10-CM

## 2021-09-15 DIAGNOSIS — R97.20 ELEVATED PSA: Primary | ICD-10-CM

## 2021-09-15 LAB
BACTERIA UR QL AUTO: NORMAL /HPF
BILIRUB UR QL STRIP: NEGATIVE
CLARITY UR: CLEAR
COLOR UR: YELLOW
GLUCOSE UR STRIP-MCNC: NEGATIVE MG/DL
HGB UR QL STRIP.AUTO: NEGATIVE
HYALINE CASTS #/AREA URNS LPF: NORMAL /LPF
KETONES UR STRIP-MCNC: NEGATIVE MG/DL
LEUKOCYTE ESTERASE UR QL STRIP: NEGATIVE
NITRITE UR QL STRIP: NEGATIVE
NON-SQ EPI CELLS URNS QL MICRO: NORMAL /HPF
PH UR STRIP.AUTO: 6 [PH]
POST-VOID RESIDUAL VOLUME, ML POC: 248 ML
PROT UR STRIP-MCNC: NEGATIVE MG/DL
RBC #/AREA URNS AUTO: NORMAL /HPF
SL AMB  POCT GLUCOSE, UA: NORMAL
SL AMB LEUKOCYTE ESTERASE,UA: NORMAL
SL AMB POCT BILIRUBIN,UA: NORMAL
SL AMB POCT BLOOD,UA: NORMAL
SL AMB POCT CLARITY,UA: CLEAR
SL AMB POCT COLOR,UA: YELLOW
SL AMB POCT KETONES,UA: NORMAL
SL AMB POCT NITRITE,UA: NORMAL
SL AMB POCT PH,UA: 6
SL AMB POCT SPECIFIC GRAVITY,UA: 1.02
SL AMB POCT URINE PROTEIN: NORMAL
SL AMB POCT UROBILINOGEN: 0.2
SP GR UR STRIP.AUTO: 1.01 (ref 1–1.03)
UROBILINOGEN UR QL STRIP.AUTO: 0.2 E.U./DL
WBC #/AREA URNS AUTO: NORMAL /HPF

## 2021-09-15 PROCEDURE — 81002 URINALYSIS NONAUTO W/O SCOPE: CPT | Performed by: NURSE PRACTITIONER

## 2021-09-15 PROCEDURE — 87086 URINE CULTURE/COLONY COUNT: CPT | Performed by: NURSE PRACTITIONER

## 2021-09-15 PROCEDURE — 51798 US URINE CAPACITY MEASURE: CPT | Performed by: NURSE PRACTITIONER

## 2021-09-15 PROCEDURE — 99204 OFFICE O/P NEW MOD 45 MIN: CPT | Performed by: NURSE PRACTITIONER

## 2021-09-15 PROCEDURE — 81001 URINALYSIS AUTO W/SCOPE: CPT | Performed by: NURSE PRACTITIONER

## 2021-09-15 RX ORDER — CALCIUM CARBONATE/VITAMIN D3 500-10/5ML
LIQUID (ML) ORAL EVERY 24 HOURS
COMMUNITY

## 2021-09-15 RX ORDER — TAMSULOSIN HYDROCHLORIDE 0.4 MG/1
0.4 CAPSULE ORAL
Qty: 30 CAPSULE | Refills: 3 | Status: SHIPPED | OUTPATIENT
Start: 2021-09-15 | End: 2021-10-13 | Stop reason: SDUPTHER

## 2021-09-15 RX ORDER — UBIDECARENONE 60 MG
CAPSULE ORAL EVERY 24 HOURS
COMMUNITY

## 2021-09-15 RX ORDER — MULTIVIT-MIN/IRON/FOLIC ACID/K 18-600-40
CAPSULE ORAL
COMMUNITY

## 2021-09-15 NOTE — PROGRESS NOTES
9/15/2021    Lexi Guicho  1955  [id number]      Assessment  -Elevated PSA  -Erectile dysfunction  -BPH with lower urinary tract symptoms    Discussion/Plan  Shira Fishman is a 61 y o  male who presents in consultation    1  Elevated PSA- urine dip in the office today appears negative for infection or blood  We will send for UA and culture and call with any significant findings  No significant findings noted on digital rectal examination today  Recent PSA from 08/20/2021 was 6 26  We discussed causes for false elevation  I recommend rechecking another PSA in 4 weeks  If findings remain elevated, we will proceed with prostate biopsy or multiparametric MRI of prostate  2  Erectile dysfunction-   Patient reports difficulties maintaining an erection  He is not interested in management at this time  Patient would like to treat his lower urinary tract symptoms and elevated PSA 1st  3  BPH with lower urinary tract symptoms-   PVR in the office today is 240 mL  We discussed his lower urinary tract symptoms of nocturia and incomplete bladder emptying  Recommend starting an alpha blocker such as tamsulosin 0 4 mg HS  He is amenable with this plan  Reviewed mechanism of action, potential side effects, and administration instructions  Prescription was electronically sent to his pharmacy  There is no evidence of suprapubic discomfort or distention  We discussed timed double voiding  Follow-up in 4-6 weeks to re-evaluate his urinary pattern, performed PVR assessment, and recheck PSA  Patient was instructed to call sooner with any issues     -All questions answered, patient agrees with plan      History of Present Illness  61 y o  male who presents in consultation today for evaluation of elevated PSA  Patient referred by his PCP  His recent PSA from 8/20/2021 was 6 26  Patient states he had a PSA about 2 years ago which was around 4  He reports increased episodes of nocturia and feeling of incomplete bladder emptying over the last 1 year  He states he has never received a digital rectal examination  Patient also reports difficulties maintaining an erection  He denies any prior urologic history, surgical intervention, or instrumentation  Patient denies any strong family history of prostate or urologic malignancy  He is a lifetime nonsmoker and reports drinking alcohol socially  Review of Systems  Review of Systems   Constitutional: Negative  HENT: Negative  Respiratory: Negative  Cardiovascular: Negative  Gastrointestinal: Negative  Genitourinary: Negative for decreased urine volume, difficulty urinating, dysuria, flank pain, frequency, hematuria and urgency  Musculoskeletal: Negative  Skin: Negative  Neurological: Negative  Psychiatric/Behavioral: Negative          Past Medical History  Past Medical History:   Diagnosis Date    Disease of thyroid gland     Enlarged prostate     Hemorrhoids     Hyperlipidemia     Melanoma ([facility] [address] )     Obesity     Tremor        Past Social History  Past Surgical History:   Procedure Laterality Date    INSERTION / PLACEMENT / REVISION NEUROSTIMULATOR      RI IMP STIM,CRANIAL,SUBQ,>1 ARRAY Left 8/17/2018    Procedure: REPLACEMENT left chest DEEP BRAIN STIMULATION GENERATOR;  Surgeon: Theresa Sorensen MD;  Location:  MAIN OR;  Service: Neurosurgery    SKIN SURGERY         Past Family History  Family History   Problem Relation Age of Onset    Stroke Mother     Anuerysm Father        Past Social history  Social History     Socioeconomic History    Marital status: /Civil Union     Spouse name: Not on file    Number of children: Not on file    Years of education: Not on file    Highest education level: Not on file   Occupational History    Not on file   Tobacco Use    Smoking status: Never Smoker    Smokeless tobacco: Former User    Tobacco comment: used chewing tobacco "many many years ago"   Substance and Sexual Activity    Alcohol use: Yes     Comment: social    Drug use: No    Sexual activity: Not on file   Other Topics Concern    Not on file   Social History Narrative    Not on file     Social Determinants of Health     Financial Resource Strain:     Difficulty of Paying Living Expenses:    Food Insecurity:     Worried About Running Out of Food in the Last Year:     [address] in the Last Year:    Transportation Needs:     Lack of Transportation (Medical):      Lack of Transportation (Non-Medical):    Physical Activity:     Days of Exercise per Week:     Minutes of Exercise per Session:    Stress:     Feeling of Stress :    Social Connections:     Frequency of Communication with Friends and Family:     Frequency of Social Gatherings with Friends and Family:     Attends [employer] Services:     Active Member of Clubs or Organizations:     Attends Club or Organization Meetings:     Marital Status:    Intimate Partner Violence:     Fear of Current or Ex-Partner:     Emotionally Abused:     Physically Abused:     Sexually Abused:        Current Medications  Current Outpatient Medications   Medication Sig Dispense Refill    Ascorbic Acid (Vitamin C) 500 MG CAPS Take by mouth      atorvastatin (LIPITOR) 80 mg tablet Take 80 mg by mouth daily      Coenzyme Q10 60 MG CAPS every 24 hours      diazepam (VALIUM) 10 mg tablet TAKE ONE TABLET BY MOUTH EVERY DAY 90 tablet 1    levothyroxine 150 mcg tablet Take 150 mcg by mouth daily      Misc Natural Products (Prostate Support) 300-15 MG TABS as directed      Multiple Vitamins-Minerals (MULTI FOR HIM 50+ PO) as directed      propranolol (INDERAL) 80 mg tablet Take 80 mg by mouth daily      QUERCETIN PO Take by mouth      Turmeric Curcumin 500 MG CAPS Take by mouth      Zinc 30 MG CAPS every 24 hours      tamsulosin (FLOMAX) 0 4 mg Take 1 capsule (0 4 mg total) by mouth daily with dinner 30 capsule 3     No current facility-administered medications for this visit  Allergies  Allergies   Allergen Reactions    Meperidine Itching     Other reaction(s): itch    Tetanus-Diphth-Acell Pertussis Other (See Comments)    Tetanus-Diphth-Acell Pertussis Other (See Comments)     Swelling and pain at site       Past Medical History, Social History, Family History, medications and allergies were reviewed  Vitals  Vitals:    09/15/21 1343   BP: 128/78   BP Location: Left arm   Patient Position: Sitting   Cuff Size: Adult   Pulse: 76   Weight: 133 kg (293 lb)   Height: 6' 1" (1 854 m)       Physical Exam  Physical Exam  Constitutional:       Appearance: Normal appearance  He is well-developed  HENT:      Head: Normocephalic  Eyes:      Pupils: Pupils are equal, round, and reactive to light  Pulmonary:      Effort: Pulmonary effort is normal    Abdominal:      Palpations: Abdomen is soft  Genitourinary:     Prostate: Normal       Rectum: Normal       Comments: Prostate 45gm, smooth, nontender, no nodules  Musculoskeletal:         General: Normal range of motion  Cervical back: Normal range of motion  Skin:     General: Skin is warm and dry  Neurological:      General: No focal deficit present  Mental Status: He is alert and oriented to person, place, and time  Psychiatric:         Mood and Affect: Mood normal          Behavior: Behavior normal          Thought Content:  Thought content normal          Judgment: Judgment normal          Results    I have personally reviewed all pertinent lab results and reviewed with patient  No results found for: PSA  Lab Results   Component Value Date    GLUCOSE 95 04/22/2014    CALCIUM 9 4 07/30/2018     04/22/2014    K 4 9 07/30/2018    CO2 30 07/30/2018     07/30/2018    BUN 16 07/30/2018    CREATININE 0 98 07/30/2018     Lab Results   Component Value Date    WBC 7 81 07/30/2018    HGB 14 5 07/30/2018    HCT 43 9 07/30/2018    MCV 97 07/30/2018     07/30/2018     Recent Results (from the past 1 hour(s))   POCT urine dip    Collection Time: 09/15/21  1:44 PM   Result Value Ref Range    LEUKOCYTE ESTERASE,UA -     NITRITE,UA -     SL AMB POCT UROBILINOGEN 0 2     POCT URINE PROTEIN -      PH,UA 6 0     BLOOD,UA -     SPECIFIC GRAVITY,UA 1 020     KETONES,UA -     BILIRUBIN,UA -     GLUCOSE, UA -      COLOR,UA yellow     CLARITY,UA clear    POCT Measure PVR    Collection Time: 09/15/21  1:45 PM   Result Value Ref Range    POST-VOID RESIDUAL VOLUME, ML  mL

## 2021-09-16 LAB — BACTERIA UR CULT: NORMAL

## 2021-10-08 ENCOUNTER — APPOINTMENT (OUTPATIENT)
Dept: LAB | Facility: CLINIC | Age: 66
End: 2021-10-08
Payer: MEDICARE

## 2021-10-08 DIAGNOSIS — R97.20 ELEVATED PSA: ICD-10-CM

## 2021-10-08 DIAGNOSIS — Z12.5 ENCOUNTER FOR SCREENING FOR MALIGNANT NEOPLASM OF PROSTATE: ICD-10-CM

## 2021-10-08 LAB — PSA SERPL-MCNC: 5 NG/ML (ref 0–4)

## 2021-10-08 PROCEDURE — 36415 COLL VENOUS BLD VENIPUNCTURE: CPT

## 2021-10-08 PROCEDURE — G0103 PSA SCREENING: HCPCS

## 2021-10-13 ENCOUNTER — OFFICE VISIT (OUTPATIENT)
Dept: UROLOGY | Facility: HOSPITAL | Age: 66
End: 2021-10-13
Payer: MEDICARE

## 2021-10-13 VITALS
HEART RATE: 75 BPM | HEIGHT: 73 IN | SYSTOLIC BLOOD PRESSURE: 128 MMHG | BODY MASS INDEX: 38.7 KG/M2 | WEIGHT: 292 LBS | DIASTOLIC BLOOD PRESSURE: 80 MMHG

## 2021-10-13 DIAGNOSIS — R39.14 BENIGN PROSTATIC HYPERPLASIA WITH INCOMPLETE BLADDER EMPTYING: ICD-10-CM

## 2021-10-13 DIAGNOSIS — R97.20 ELEVATED PSA: ICD-10-CM

## 2021-10-13 DIAGNOSIS — N40.1 BENIGN PROSTATIC HYPERPLASIA WITH INCOMPLETE BLADDER EMPTYING: ICD-10-CM

## 2021-10-13 DIAGNOSIS — N52.9 ERECTILE DYSFUNCTION, UNSPECIFIED ERECTILE DYSFUNCTION TYPE: ICD-10-CM

## 2021-10-13 DIAGNOSIS — N40.1 BENIGN PROSTATIC HYPERPLASIA WITH LOWER URINARY TRACT SYMPTOMS, SYMPTOM DETAILS UNSPECIFIED: Primary | ICD-10-CM

## 2021-10-13 LAB — POST-VOID RESIDUAL VOLUME, ML POC: 66 ML

## 2021-10-13 PROCEDURE — 99213 OFFICE O/P EST LOW 20 MIN: CPT | Performed by: NURSE PRACTITIONER

## 2021-10-13 PROCEDURE — 51798 US URINE CAPACITY MEASURE: CPT | Performed by: NURSE PRACTITIONER

## 2021-10-13 RX ORDER — TAMSULOSIN HYDROCHLORIDE 0.4 MG/1
0.4 CAPSULE ORAL
Qty: 90 CAPSULE | Refills: 3 | Status: SHIPPED | OUTPATIENT
Start: 2021-10-13

## 2021-11-19 ENCOUNTER — NURSE TRIAGE (OUTPATIENT)
Dept: OTHER | Facility: OTHER | Age: 66
End: 2021-11-19

## 2021-11-23 ENCOUNTER — PROCEDURE VISIT (OUTPATIENT)
Dept: UROLOGY | Facility: HOSPITAL | Age: 66
End: 2021-11-23
Payer: MEDICARE

## 2021-11-23 ENCOUNTER — TELEPHONE (OUTPATIENT)
Dept: OTHER | Facility: OTHER | Age: 66
End: 2021-11-23

## 2021-11-23 ENCOUNTER — NURSE TRIAGE (OUTPATIENT)
Dept: OTHER | Facility: OTHER | Age: 66
End: 2021-11-23

## 2021-11-23 VITALS
HEIGHT: 73 IN | BODY MASS INDEX: 38.7 KG/M2 | WEIGHT: 292 LBS | DIASTOLIC BLOOD PRESSURE: 82 MMHG | SYSTOLIC BLOOD PRESSURE: 128 MMHG | HEART RATE: 81 BPM

## 2021-11-23 DIAGNOSIS — R97.20 ELEVATED PSA: Primary | ICD-10-CM

## 2021-11-23 PROCEDURE — G0416 PROSTATE BIOPSY, ANY MTHD: HCPCS | Performed by: PATHOLOGY

## 2021-11-23 PROCEDURE — 88342 IMHCHEM/IMCYTCHM 1ST ANTB: CPT | Performed by: PATHOLOGY

## 2021-11-23 PROCEDURE — 88344 IMHCHEM/IMCYTCHM EA MLT ANTB: CPT | Performed by: PATHOLOGY

## 2021-11-23 PROCEDURE — 55700 PR BIOPSY OF PROSTATE,NEEDLE/PUNCH: CPT | Performed by: UROLOGY

## 2021-11-23 PROCEDURE — 76942 ECHO GUIDE FOR BIOPSY: CPT | Performed by: UROLOGY

## 2021-11-23 PROCEDURE — 96372 THER/PROPH/DIAG INJ SC/IM: CPT

## 2021-11-23 RX ORDER — CEFTRIAXONE 1 G/1
1000 INJECTION, POWDER, FOR SOLUTION INTRAMUSCULAR; INTRAVENOUS ONCE
Status: COMPLETED | OUTPATIENT
Start: 2021-11-23 | End: 2021-11-23

## 2021-11-23 RX ORDER — CIPROFLOXACIN 750 MG/1
750 TABLET, FILM COATED ORAL EVERY 12 HOURS SCHEDULED
Qty: 2 TABLET | Refills: 0 | Status: SHIPPED | OUTPATIENT
Start: 2021-11-23 | End: 2021-11-24

## 2021-11-23 RX ADMIN — CEFTRIAXONE 1000 MG: 1 INJECTION, POWDER, FOR SOLUTION INTRAMUSCULAR; INTRAVENOUS at 13:24

## 2021-11-26 ENCOUNTER — TELEPHONE (OUTPATIENT)
Dept: UROLOGY | Facility: MEDICAL CENTER | Age: 66
End: 2021-11-26

## 2021-11-29 ENCOUNTER — TELEPHONE (OUTPATIENT)
Dept: UROLOGY | Facility: MEDICAL CENTER | Age: 66
End: 2021-11-29

## 2022-01-03 DIAGNOSIS — G25.0 BENIGN FAMILIAL TREMOR: ICD-10-CM

## 2022-01-03 NOTE — TELEPHONE ENCOUNTER
Pt called requesting valium refill, 90 day supply be sent to [facility] pharmacy on file  Rx entered   Pls review and sign off    thanks

## 2022-01-04 RX ORDER — DIAZEPAM 10 MG/1
10 TABLET ORAL DAILY
Qty: 90 TABLET | Refills: 0 | Status: SHIPPED | OUTPATIENT
Start: 2022-01-04 | End: 2022-03-09 | Stop reason: SDUPTHER

## 2022-03-09 ENCOUNTER — OFFICE VISIT (OUTPATIENT)
Dept: NEUROLOGY | Facility: CLINIC | Age: 67
End: 2022-03-09
Payer: MEDICARE

## 2022-03-09 ENCOUNTER — TELEPHONE (OUTPATIENT)
Dept: NEUROLOGY | Facility: CLINIC | Age: 67
End: 2022-03-09

## 2022-03-09 VITALS — HEART RATE: 70 BPM | SYSTOLIC BLOOD PRESSURE: 128 MMHG | DIASTOLIC BLOOD PRESSURE: 82 MMHG

## 2022-03-09 DIAGNOSIS — Z96.89 S/P DEEP BRAIN STIMULATOR PLACEMENT: ICD-10-CM

## 2022-03-09 DIAGNOSIS — G25.0 BENIGN FAMILIAL TREMOR: ICD-10-CM

## 2022-03-09 DIAGNOSIS — G25.0 BENIGN ESSENTIAL TREMOR: Primary | ICD-10-CM

## 2022-03-09 PROCEDURE — 99214 OFFICE O/P EST MOD 30 MIN: CPT | Performed by: PSYCHIATRY & NEUROLOGY

## 2022-03-09 RX ORDER — DIAZEPAM 10 MG/1
10 TABLET ORAL DAILY
Qty: 90 TABLET | Refills: 1 | Status: SHIPPED | OUTPATIENT
Start: 2022-04-01

## 2022-03-09 NOTE — PROGRESS NOTES
Review of Systems   Constitutional: Negative  Negative for appetite change and fever  HENT: Negative  Negative for hearing loss, tinnitus, trouble swallowing and voice change  Eyes: Positive for visual disturbance (floater in right eye)  Negative for photophobia and pain  Respiratory: Negative  Negative for shortness of breath  Cardiovascular: Negative  Negative for palpitations  Gastrointestinal: Negative  Negative for nausea and vomiting  Endocrine: Negative  Negative for cold intolerance  Genitourinary: Negative  Negative for dysuria, frequency and urgency  Musculoskeletal: Negative  Negative for myalgias and neck pain  Skin: Negative  Negative for rash  Allergic/Immunologic: Negative  Neurological: Positive for tremors (Hands and has stayed the same)  Negative for dizziness, seizures, syncope, facial asymmetry, speech difficulty, weakness, light-headedness, numbness and headaches  Hematological: Negative  Does not bruise/bleed easily  Psychiatric/Behavioral: Negative  Negative for confusion, hallucinations and sleep disturbance  All other systems reviewed and are negative

## 2022-03-09 NOTE — PROGRESS NOTES
Patient ID: Ajit Edmondson is a 61 y o  male    Assessment/Plan:    Benign essential tremor  Mild breakthrough tremor on the right  Deep brain stimulator interrogated but no changed made as right hand tremor remains well controlled on current settings  Left Activa SC, VIM  Battery 2 58 not reading ADEOLA as of yet   C+0-  3 2V, PW90, 185Hz  Imp 868, 3 7mA  Electrode imp all ok    Left hand tremor continues to worsen  He continues to contemplate having right lead placement to control left hand  He wishes to wait until summer  Time spent answering questions regarding DBS  Will refer to neurosurgery for replacement IPG planning  Will also need to speak with neurosurgery regarding placement options if he decides to have right lead placed  He has noticed  turns off his device each night but notices it is back on with him turning it on every morning  Will reach out to tech to see if this can be explained  Will also look at predicted battery life with switch to Percept  He will contact me when he wishes to proceed with R Vim placement and we will refer for neuropsychological testing/           Diagnoses and all orders for this visit:    Benign essential tremor  -     Ambulatory referral to Neurosurgery; Future    S/P deep brain stimulator placement  -     Ambulatory referral to Neurosurgery; Future    Benign familial tremor  -     diazepam (VALIUM) 10 mg tablet; Take 1 tablet (10 mg total) by mouth daily        Subjective:       Mr Ajit Edmondson is a right-handed retired  with essential tremor s/p LEFT VIM DBS, left chest placed 3/28/2000 in [place] by Dr Usha Merchant, with multiple prior IPG replacements lasting 2-3 years, switched from [facility] to [facility] on 12/20/11, who returns for follow up  To review, action tremor began gradually, first noted around the 9th grade  Overtime tremor has progressed  There is rest tremor  He has been on Valium 10mg daily and Inderal 80mg daily all his life   Previously on gabapentin but this was associated with sedation  Never tried topiramate or primidone  Overtime he has developed a left hand tremor but has not been interested in R lead placement  He is not interested in further medication trials for  tremor  He remains on propranolol 80mg daily, and diazepam 10mg daily  Objective:    /82 (BP Location: Left arm, Patient Position: Sitting, Cuff Size: Standard)   Pulse 70       Physical Exam  Eyes:      Extraocular Movements: Extraocular movements intact  Pupils: Pupils are equal, round, and reactive to light  Neurological:      Deep Tendon Reflexes: Strength normal    Psychiatric:         Speech: Speech normal          Neurological Exam  Mental Status   Oriented to person, place, time and situation  Recent and remote memory are intact  Speech is normal  Follows complex commands  Attention and concentration are normal     Cranial Nerves  CN II: Right funduscopic exam: not visualized  Left funduscopic exam: not visualized  CN III, IV, VI: Extraocular movements intact bilaterally  Pupils equal round and reactive to light bilaterally  CN VII: Full and symmetric facial movement  CN VIII: Hearing is normal   CN IX, X: Palate elevates symmetrically  CN XI: Shoulder shrug strength is normal   CN XII: Tongue midline without atrophy or fasciculations  Motor   Normal muscle tone  Strength is 5/5 throughout all four extremities  Sensory  Light touch is normal in upper and lower extremities  Coordination  Right: Finger-to-nose normal  Rapid alternating movement normal   Left: Finger-to-nose abnormality: Rapid alternating movement normal   No - no head tremor only while distracted writing  Moderate tremor on left FTN  No tremor on right FTN   No rest tremor or bradykinesia  Gait  Casual gait is normal including stance, stride, and arm swing  Able to rise from chair without using arms           Current Outpatient Medications on File Prior to Visit Medication Sig Dispense Refill    Ascorbic Acid (Vitamin C) 500 MG CAPS Take by mouth      atorvastatin (LIPITOR) 80 mg tablet Take 80 mg by mouth daily      Coenzyme Q10 60 MG CAPS every 24 hours      levothyroxine 150 mcg tablet Take 150 mcg by mouth daily      Multiple Vitamins-Minerals (MULTI FOR HIM 50+ PO) as directed      propranolol (INDERAL) 80 mg tablet Take 80 mg by mouth daily      QUERCETIN PO Take by mouth      tamsulosin (FLOMAX) 0 4 mg Take 1 capsule (0 4 mg total) by mouth daily with dinner 90 capsule 3    Turmeric Curcumin 500 MG CAPS Take by mouth      Zinc 30 MG CAPS every 24 hours      [DISCONTINUED] diazepam (VALIUM) 10 mg tablet Take 1 tablet (10 mg total) by mouth daily 90 tablet 0    Misc Natural Products (Prostate Support) 300-15 MG TABS as directed (Patient not taking: Reported on 3/9/2022)       No current facility-administered medications on file prior to visit           Eyad Escalera MD  Movement disorder physician  [address]

## 2022-03-09 NOTE — PATIENT INSTRUCTIONS
Will place neurosurgery referral for left IPG replacement in near future after speaking with neurosurgery

## 2022-03-09 NOTE — ASSESSMENT & PLAN NOTE
Mild breakthrough tremor on the right  Deep brain stimulator interrogated but no changed made as right hand tremor remains well controlled on current settings  Left Activa SC, VIM  Battery 2 58 not reading ADEOLA as of yet   C+0-  3 2V, PW90, 185Hz  Imp 868, 3 7mA  Electrode imp all ok    Left hand tremor continues to worsen  He continues to contemplate having right lead placement to control left hand  He wishes to wait until summer  Time spent answering questions regarding DBS  Will refer to neurosurgery for replacement IPG planning  Will also need to speak with neurosurgery regarding placement options if he decides to have right lead placed  He has noticed  turns off his device each night but notices it is back on with him turning it on every morning  Will reach out to tech to see if this can be explained  Will also look at predicted battery life with switch to Percept  He will contact me when he wishes to proceed with R Vim placement and we will refer for neuropsychological testing/

## 2022-04-04 ENCOUNTER — CONSULT (OUTPATIENT)
Dept: NEUROSURGERY | Facility: CLINIC | Age: 67
End: 2022-04-04
Payer: MEDICARE

## 2022-04-04 VITALS
TEMPERATURE: 97.9 F | SYSTOLIC BLOOD PRESSURE: 126 MMHG | HEART RATE: 78 BPM | BODY MASS INDEX: 37.11 KG/M2 | HEIGHT: 73 IN | OXYGEN SATURATION: 96 % | WEIGHT: 280 LBS | DIASTOLIC BLOOD PRESSURE: 86 MMHG

## 2022-04-04 DIAGNOSIS — G25.0 BENIGN ESSENTIAL TREMOR: Primary | ICD-10-CM

## 2022-04-04 DIAGNOSIS — Z96.89 S/P DEEP BRAIN STIMULATOR PLACEMENT: ICD-10-CM

## 2022-04-04 DIAGNOSIS — Z45.42 END OF BATTERY LIFE OF DEEP BRAIN STIMULATOR: ICD-10-CM

## 2022-04-04 PROCEDURE — 99204 OFFICE O/P NEW MOD 45 MIN: CPT | Performed by: NEUROLOGICAL SURGERY

## 2022-04-04 NOTE — H&P (VIEW-ONLY)
Neurosurgery Office Note  Bard Slater 61 y o  male MRN: [id number]      Assessment/Plan      Diagnoses and all orders for this visit:    Benign essential tremor  -     Ambulatory referral to Neurosurgery  -     Case request operating room: REPLACEMENT IMPLANTABLE PULSE [address] - LEFT CHEST; Standing  -     Case request operating room: [address] - LEFT CHEST    S/P deep brain stimulator placement  -     Ambulatory referral to Neurosurgery  -     Case request operating room: REPLACEMENT IMPLANTABLE PULSE GENERATOR DEEP BRAIN STIMULATION - LEFT CHEST; Standing  -     Case request operating room: [address] - LEFT CHEST    End of battery life of deep brain stimulator  -     Case request operating room: REPLACEMENT IMPLANTABLE [address] - LEFT CHEST; Standing  -     Case request operating room: [address] - LEFT CHEST          Discussion:        79-year-old male with a left VI M D brain lead placed in 2000 by Dr Nathalie Huerta at [facility]  Has since had some IPG changes, last by Dr Kulwant Solis, 8/2018  Follows with Dr Mary Rouse, who recently noted that his left Activa SC IPG was reaching end of life  Was referred here for replacement of that  Reviewed with the patient and his wife the process of replacing his left chest DBS IPG  We discussed attendant risks, that this is an outpatient procedure, done under sedation etcetera  He would like to proceed written consent obtained  We also discussed with some detail placement of DBS on his right side to address his left sided tremor  We reviewed that this procedure is done partly awake, to confirm proper placement, as well as a second stage one week later, for placement of the IPG    In his case, I would plan for a separate right-sided IPG for the right-sided lead   We discussed the possibility of tunneling the new right-sided lead to the left, and then incorporating both leads into a dual channel left-sided IPG  However, that would require replacing his newly replaced left-sided single channel IPG that point, not to mention exposing the left side to potential infection etcetera  As such, I suggested and felt it most practical to maintain separate systems with separate single channel IPGs  We discussed minimal shave at the incision site, that he ideally would have a new MRI scan of the brain, not to mention other workup through Dr Delmy Rios  We will discuss with our [employer] representatives at the time of his left-sided IPG change whether not his current lead is MRI compatible/conditional over there we will need to utilize other imaging for target localization  CHIEF COMPLAINT    No chief complaint on file  HISTORY    History of Present Illness     61y o  year old male     HPI    See Discussion    REVIEW OF SYSTEMS    Review of Systems   Constitutional: Negative  HENT: Negative  Eyes: Negative  Respiratory: Negative  Cardiovascular: Negative  Gastrointestinal: Negative  Endocrine: Negative  Genitourinary: Negative  Musculoskeletal: Negative  Skin: Negative  Allergic/Immunologic: Negative  Neurological: Positive for tremors (DBS battery is "shot")  Hematological: Negative  Psychiatric/Behavioral: Negative  All other systems reviewed and are negative          Meds/Allergies     Current Outpatient Medications   Medication Sig Dispense Refill    Ascorbic Acid (Vitamin C) 500 MG CAPS Take by mouth      atorvastatin (LIPITOR) 80 mg tablet Take 80 mg by mouth daily      Coenzyme Q10 60 MG CAPS every 24 hours      diazepam (VALIUM) 10 mg tablet Take 1 tablet (10 mg total) by mouth daily 90 tablet 1    levothyroxine 150 mcg tablet Take 150 mcg by mouth daily      Misc Natural Products (Prostate Support) 300-15 MG TABS as directed (Patient not taking: Reported on 3/9/2022)      Multiple Vitamins-Minerals (MULTI FOR HIM 50+ PO) as directed      propranolol (INDERAL) 80 mg tablet Take 80 mg by mouth daily      QUERCETIN PO Take by mouth      tamsulosin (FLOMAX) 0 4 mg Take 1 capsule (0 4 mg total) by mouth daily with dinner 90 capsule 3    Turmeric Curcumin 500 MG CAPS Take by mouth      Zinc 30 MG CAPS every 24 hours       No current facility-administered medications for this visit  Allergies   Allergen Reactions    Meperidine Itching     Other reaction(s): itch  Other reaction(s): itch    Tetanus-Diphth-Acell Pertussis Other (See Comments)     Other reaction(s): Unknown    Tetanus-Diphth-Acell Pertussis Other (See Comments)     Swelling and pain at site       PAST HISTORY    Past Medical History:   Diagnosis Date    Disease of thyroid gland     Enlarged prostate     Hemorrhoids     Hyperlipidemia     Melanoma ([address] )     Obesity     Tremor        Past Surgical History:   Procedure Laterality Date    INSERTION / PLACEMENT / REVISION NEUROSTIMULATOR      AL IMP STIM,CRANIAL,SUBQ,>1 ARRAY Left 8/17/2018    Procedure: REPLACEMENT left chest DEEP BRAIN STIMULATION GENERATOR;  Surgeon: Bishnu Jett MD;  Location: [facility] [place] [facility];  Service: Neurosurgery    SKIN SURGERY         Social History     Tobacco Use    Smoking status: Never Smoker    Smokeless tobacco: Former User    Tobacco comment: used chewing tobacco "many many years ago"   Substance Use Topics    Alcohol use: Yes     Comment: social    Drug use: No       Family History   Problem Relation Age of Onset    Stroke Mother    Inis Stagers Father          The following portions of the patient's history were reviewed in this encounter and updated as appropriate: Past medical, surgical, family, and social history, as well as medications, allergies, and review of systems          EXAM    Vitals:Blood pressure 126/86, pulse 78, temperature 97 9 °F (36 6 °C), temperature source Temporal, height 6' 1" (1 854 m), weight 127 kg (280 lb), SpO2 96 %  ,Body mass index is 36 94 kg/m²  Physical Exam  Vitals reviewed  Constitutional:       Appearance: Normal appearance  He is well-developed  HENT:      Head: Normocephalic and atraumatic  Eyes:      General: No scleral icterus  Cardiovascular:      Rate and Rhythm: Normal rate  Pulmonary:      Effort: Pulmonary effort is normal    Musculoskeletal:      Cervical back: Neck supple  Skin:     General: Skin is warm and dry  Neurological:      Mental Status: He is alert and oriented to person, place, and time  Sensory: No sensory deficit  Psychiatric:         Speech: Speech normal          Behavior: Behavior normal          Neurologic Exam     Mental Status   Oriented to person, place, and time  Attention: normal    Speech: speech is normal   Level of consciousness: alert    Cranial Nerves     CN VII   Facial expression full, symmetric  Motor Exam   Muscle bulk: normal  Overall muscle tone: normal  Moves all extremities, grossly normal     Gait, Coordination, and Reflexes     Tremor   Resting tremor: present (left hand  right hand without tremor)  Intention tremor: absent  Action tremor: left arm  No aids  MEDICAL DECISION MAKING    Imaging Studies:     Dr Madelin Olivares office note from 3/9/22 personally reviewed  PLEASE NOTE:  This encounter may have been completed utilizing the [employer] Direct Speech Voice Recognition Software  Grammatical errors, random word insertions, pronoun errors and incomplete sentences are occasional consequences of the system due to software limitations, ambient noise and hardware issues  These may be missed by proof reading prior to affixing electronic signature  Any questions or concerns about the content, text or information contained within the body of this dictation should be directly addressed to the advanced practitioner or physician for clarification

## 2022-04-04 NOTE — ADDENDUM NOTE
----- Message from Shamir Mares sent at 7/19/2018  9:11 AM EDT -----  Pt is aware  Order placed  Once we get results, we will determine if pt still needs prolia before I order it   ----- Message -----  From: Sinai Cope MD  Sent: 7/18/2018   7:13 PM  To: [facility] Mares    Yes please    ----- Message -----  From: [employer] Mares  Sent: 7/18/2018   2:23 PM  To: Sinai Cope MD    Pt was on prolia in the past  Last injection was 4/2017  Pt is wondering if we can order a dexa on her to see if prolia is still recommended  Last dexa 2016  Ok to order for pt before I order prolia for her? Addended by: Flora Cuellar on: 4/4/2022 04:14 PM     Modules accepted: Orders, SmartSet

## 2022-04-04 NOTE — PROGRESS NOTES
Neurosurgery Office Note  Kika Cantu 61 y o  male MRN: [id number]      Assessment/Plan      Diagnoses and all orders for this visit:    Benign essential tremor  -     Ambulatory referral to Neurosurgery  -     Case request operating room: REPLACEMENT IMPLANTABLE PULSE [address] - LEFT CHEST; Standing  -     Case request operating room: [address] - LEFT CHEST    S/P deep brain stimulator placement  -     Ambulatory referral to Neurosurgery  -     Case request operating room: REPLACEMENT IMPLANTABLE PULSE GENERATOR DEEP BRAIN STIMULATION - LEFT CHEST; Standing  -     Case request operating room: [address] - LEFT CHEST    End of battery life of deep brain stimulator  -     Case request operating room: REPLACEMENT IMPLANTABLE [address] - LEFT CHEST; Standing  -     Case request operating room: [address] - LEFT CHEST          Discussion:        82-year-old male with a left VI M D brain lead placed in 2000 by Dr Jean Miranda at [facility]  Has since had some IPG changes, last by Dr Rubina Fernandez, 8/2018  Follows with Dr Georgina Magallanes, who recently noted that his left Activa SC IPG was reaching end of life  Was referred here for replacement of that  Reviewed with the patient and his wife the process of replacing his left chest DBS IPG  We discussed attendant risks, that this is an outpatient procedure, done under sedation etcetera  He would like to proceed written consent obtained  We also discussed with some detail placement of DBS on his right side to address his left sided tremor  We reviewed that this procedure is done partly awake, to confirm proper placement, as well as a second stage one week later, for placement of the IPG    In his case, I would plan for a separate right-sided IPG for the right-sided lead   We discussed the possibility of tunneling the new right-sided lead to the left, and then incorporating both leads into a dual channel left-sided IPG  However, that would require replacing his newly replaced left-sided single channel IPG that point, not to mention exposing the left side to potential infection etcetera  As such, I suggested and felt it most practical to maintain separate systems with separate single channel IPGs  We discussed minimal shave at the incision site, that he ideally would have a new MRI scan of the brain, not to mention other workup through Dr Josef Werner  We will discuss with our [employer] representatives at the time of his left-sided IPG change whether not his current lead is MRI compatible/conditional over there we will need to utilize other imaging for target localization  CHIEF COMPLAINT    No chief complaint on file  HISTORY    History of Present Illness     61y o  year old male     HPI    See Discussion    REVIEW OF SYSTEMS    Review of Systems   Constitutional: Negative  HENT: Negative  Eyes: Negative  Respiratory: Negative  Cardiovascular: Negative  Gastrointestinal: Negative  Endocrine: Negative  Genitourinary: Negative  Musculoskeletal: Negative  Skin: Negative  Allergic/Immunologic: Negative  Neurological: Positive for tremors (DBS battery is "shot")  Hematological: Negative  Psychiatric/Behavioral: Negative  All other systems reviewed and are negative          Meds/Allergies     Current Outpatient Medications   Medication Sig Dispense Refill    Ascorbic Acid (Vitamin C) 500 MG CAPS Take by mouth      atorvastatin (LIPITOR) 80 mg tablet Take 80 mg by mouth daily      Coenzyme Q10 60 MG CAPS every 24 hours      diazepam (VALIUM) 10 mg tablet Take 1 tablet (10 mg total) by mouth daily 90 tablet 1    levothyroxine 150 mcg tablet Take 150 mcg by mouth daily      Misc Natural Products (Prostate Support) 300-15 MG TABS as directed (Patient not taking: Reported on 3/9/2022)      Multiple Vitamins-Minerals (MULTI FOR HIM 50+ PO) as directed      propranolol (INDERAL) 80 mg tablet Take 80 mg by mouth daily      QUERCETIN PO Take by mouth      tamsulosin (FLOMAX) 0 4 mg Take 1 capsule (0 4 mg total) by mouth daily with dinner 90 capsule 3    Turmeric Curcumin 500 MG CAPS Take by mouth      Zinc 30 MG CAPS every 24 hours       No current facility-administered medications for this visit  Allergies   Allergen Reactions    Meperidine Itching     Other reaction(s): itch  Other reaction(s): itch    Tetanus-Diphth-Acell Pertussis Other (See Comments)     Other reaction(s): Unknown    Tetanus-Diphth-Acell Pertussis Other (See Comments)     Swelling and pain at site       PAST HISTORY    Past Medical History:   Diagnosis Date    Disease of thyroid gland     Enlarged prostate     Hemorrhoids     Hyperlipidemia     Melanoma ([address] )     Obesity     Tremor        Past Surgical History:   Procedure Laterality Date    INSERTION / PLACEMENT / REVISION NEUROSTIMULATOR      ME IMP STIM,CRANIAL,SUBQ,>1 ARRAY Left 8/17/2018    Procedure: REPLACEMENT left chest DEEP BRAIN STIMULATION GENERATOR;  Surgeon: Matthew Gunn MD;  Location:  MAIN OR;  Service: Neurosurgery    SKIN SURGERY         Social History     Tobacco Use    Smoking status: Never Smoker    Smokeless tobacco: Former User    Tobacco comment: used chewing tobacco "many many years ago"   Substance Use Topics    Alcohol use: Yes     Comment: social    Drug use: No       Family History   Problem Relation Age of Onset    Stroke Mother    Sameera Guillaume Father          The following portions of the patient's history were reviewed in this encounter and updated as appropriate: Past medical, surgical, family, and social history, as well as medications, allergies, and review of systems          EXAM    Vitals:Blood pressure 126/86, pulse 78, temperature 97 9 °F (36 6 °C), temperature source Temporal, height 6' 1" (1 854 m), weight 127 kg (280 lb), SpO2 96 %  ,Body mass index is 36 94 kg/m²  Physical Exam  Vitals reviewed  Constitutional:       Appearance: Normal appearance  He is well-developed  HENT:      Head: Normocephalic and atraumatic  Eyes:      General: No scleral icterus  Cardiovascular:      Rate and Rhythm: Normal rate  Pulmonary:      Effort: Pulmonary effort is normal    Musculoskeletal:      Cervical back: Neck supple  Skin:     General: Skin is warm and dry  Neurological:      Mental Status: He is alert and oriented to person, place, and time  Sensory: No sensory deficit  Psychiatric:         Speech: Speech normal          Behavior: Behavior normal          Neurologic Exam     Mental Status   Oriented to person, place, and time  Attention: normal    Speech: speech is normal   Level of consciousness: alert    Cranial Nerves     CN VII   Facial expression full, symmetric  Motor Exam   Muscle bulk: normal  Overall muscle tone: normal  Moves all extremities, grossly normal     Gait, Coordination, and Reflexes     Tremor   Resting tremor: present (left hand  right hand without tremor)  Intention tremor: absent  Action tremor: left arm  No aids  MEDICAL DECISION MAKING    Imaging Studies:     Dr Xochitl Lindsay office note from 3/9/22 personally reviewed  PLEASE NOTE:  This encounter may have been completed utilizing the [employer] Direct Speech Voice Recognition Software  Grammatical errors, random word insertions, pronoun errors and incomplete sentences are occasional consequences of the system due to software limitations, ambient noise and hardware issues  These may be missed by proof reading prior to affixing electronic signature  Any questions or concerns about the content, text or information contained within the body of this dictation should be directly addressed to the advanced practitioner or physician for clarification

## 2022-04-05 ENCOUNTER — APPOINTMENT (OUTPATIENT)
Dept: LAB | Facility: CLINIC | Age: 67
End: 2022-04-05
Payer: MEDICARE

## 2022-04-05 DIAGNOSIS — N40.1 BENIGN PROSTATIC HYPERPLASIA WITH LOWER URINARY TRACT SYMPTOMS, SYMPTOM DETAILS UNSPECIFIED: ICD-10-CM

## 2022-04-05 DIAGNOSIS — R97.20 ELEVATED PSA: ICD-10-CM

## 2022-04-05 DIAGNOSIS — Z96.89 S/P DEEP BRAIN STIMULATOR PLACEMENT: ICD-10-CM

## 2022-04-05 DIAGNOSIS — G25.0 BENIGN ESSENTIAL TREMOR: ICD-10-CM

## 2022-04-05 DIAGNOSIS — Z45.42 END OF BATTERY LIFE OF DEEP BRAIN STIMULATOR: ICD-10-CM

## 2022-04-05 DIAGNOSIS — E78.00 PURE HYPERCHOLESTEROLEMIA: ICD-10-CM

## 2022-04-05 DIAGNOSIS — E03.9 HYPOTHYROIDISM, ADULT: ICD-10-CM

## 2022-04-05 DIAGNOSIS — E66.01 MORBID OBESITY (HCC): ICD-10-CM

## 2022-04-05 DIAGNOSIS — Z85.820 HISTORY OF MELANOMA: ICD-10-CM

## 2022-04-05 LAB
ALBUMIN SERPL BCP-MCNC: 4.1 G/DL (ref 3.5–5)
ALP SERPL-CCNC: 102 U/L (ref 46–116)
ALT SERPL W P-5'-P-CCNC: 26 U/L (ref 12–78)
ANION GAP SERPL CALCULATED.3IONS-SCNC: 3 MMOL/L (ref 4–13)
APTT PPP: 29 SECONDS (ref 23–37)
AST SERPL W P-5'-P-CCNC: 23 U/L (ref 5–45)
BACTERIA UR QL AUTO: ABNORMAL /HPF
BASOPHILS # BLD AUTO: 0.04 THOUSANDS/ΜL (ref 0–0.1)
BASOPHILS NFR BLD AUTO: 0 % (ref 0–1)
BILIRUB SERPL-MCNC: 0.86 MG/DL (ref 0.2–1)
BILIRUB UR QL STRIP: NEGATIVE
BUN SERPL-MCNC: 18 MG/DL (ref 5–25)
CALCIUM SERPL-MCNC: 9.4 MG/DL (ref 8.3–10.1)
CHLORIDE SERPL-SCNC: 106 MMOL/L (ref 100–108)
CHOLEST SERPL-MCNC: 176 MG/DL
CLARITY UR: CLEAR
CO2 SERPL-SCNC: 29 MMOL/L (ref 21–32)
COLOR UR: YELLOW
CREAT SERPL-MCNC: 0.97 MG/DL (ref 0.6–1.3)
EOSINOPHIL # BLD AUTO: 0.09 THOUSAND/ΜL (ref 0–0.61)
EOSINOPHIL NFR BLD AUTO: 1 % (ref 0–6)
ERYTHROCYTE [DISTWIDTH] IN BLOOD BY AUTOMATED COUNT: 13 % (ref 11.6–15.1)
EST. AVERAGE GLUCOSE BLD GHB EST-MCNC: 111 MG/DL
GFR SERPL CREATININE-BSD FRML MDRD: 82 ML/MIN/1.73SQ M
GLUCOSE P FAST SERPL-MCNC: 97 MG/DL (ref 65–99)
GLUCOSE UR STRIP-MCNC: NEGATIVE MG/DL
HBA1C MFR BLD: 5.5 %
HCT VFR BLD AUTO: 46.8 % (ref 36.5–49.3)
HDLC SERPL-MCNC: 42 MG/DL
HGB BLD-MCNC: 15.2 G/DL (ref 12–17)
HGB UR QL STRIP.AUTO: NEGATIVE
HYALINE CASTS #/AREA URNS LPF: ABNORMAL /LPF
IMM GRANULOCYTES # BLD AUTO: 0.05 THOUSAND/UL (ref 0–0.2)
IMM GRANULOCYTES NFR BLD AUTO: 1 % (ref 0–2)
INR PPP: 0.96 (ref 0.84–1.19)
KETONES UR STRIP-MCNC: NEGATIVE MG/DL
LDLC SERPL CALC-MCNC: 100 MG/DL (ref 0–100)
LEUKOCYTE ESTERASE UR QL STRIP: ABNORMAL
LYMPHOCYTES # BLD AUTO: 2.03 THOUSANDS/ΜL (ref 0.6–4.47)
LYMPHOCYTES NFR BLD AUTO: 23 % (ref 14–44)
MCH RBC QN AUTO: 32.1 PG (ref 26.8–34.3)
MCHC RBC AUTO-ENTMCNC: 32.5 G/DL (ref 31.4–37.4)
MCV RBC AUTO: 99 FL (ref 82–98)
MONOCYTES # BLD AUTO: 0.92 THOUSAND/ΜL (ref 0.17–1.22)
MONOCYTES NFR BLD AUTO: 10 % (ref 4–12)
MUCOUS THREADS UR QL AUTO: ABNORMAL
NEUTROPHILS # BLD AUTO: 5.76 THOUSANDS/ΜL (ref 1.85–7.62)
NEUTS SEG NFR BLD AUTO: 65 % (ref 43–75)
NITRITE UR QL STRIP: NEGATIVE
NON-SQ EPI CELLS URNS QL MICRO: ABNORMAL /HPF
NRBC BLD AUTO-RTO: 0 /100 WBCS
PH UR STRIP.AUTO: 5.5 [PH]
PLATELET # BLD AUTO: 220 THOUSANDS/UL (ref 149–390)
PMV BLD AUTO: 10.3 FL (ref 8.9–12.7)
POTASSIUM SERPL-SCNC: 5 MMOL/L (ref 3.5–5.3)
PROT SERPL-MCNC: 7.2 G/DL (ref 6.4–8.2)
PROT UR STRIP-MCNC: ABNORMAL MG/DL
PROTHROMBIN TIME: 12.4 SECONDS (ref 11.6–14.5)
PSA SERPL-MCNC: 5 NG/ML (ref 0–4)
RBC # BLD AUTO: 4.73 MILLION/UL (ref 3.88–5.62)
RBC #/AREA URNS AUTO: ABNORMAL /HPF
SODIUM SERPL-SCNC: 138 MMOL/L (ref 136–145)
SP GR UR STRIP.AUTO: 1.02 (ref 1–1.03)
TRIGL SERPL-MCNC: 171 MG/DL
TSH SERPL DL<=0.05 MIU/L-ACNC: 1.23 UIU/ML (ref 0.45–4.5)
UROBILINOGEN UR STRIP-ACNC: <2 MG/DL
WBC # BLD AUTO: 8.89 THOUSAND/UL (ref 4.31–10.16)
WBC #/AREA URNS AUTO: ABNORMAL /HPF

## 2022-04-05 PROCEDURE — 84443 ASSAY THYROID STIM HORMONE: CPT

## 2022-04-05 PROCEDURE — G0103 PSA SCREENING: HCPCS

## 2022-04-05 PROCEDURE — 83036 HEMOGLOBIN GLYCOSYLATED A1C: CPT

## 2022-04-05 PROCEDURE — 85610 PROTHROMBIN TIME: CPT

## 2022-04-05 PROCEDURE — 85025 COMPLETE CBC W/AUTO DIFF WBC: CPT

## 2022-04-05 PROCEDURE — 85730 THROMBOPLASTIN TIME PARTIAL: CPT

## 2022-04-05 PROCEDURE — 36415 COLL VENOUS BLD VENIPUNCTURE: CPT

## 2022-04-05 PROCEDURE — 80061 LIPID PANEL: CPT

## 2022-04-05 PROCEDURE — 81001 URINALYSIS AUTO W/SCOPE: CPT | Performed by: NEUROLOGICAL SURGERY

## 2022-04-05 PROCEDURE — 80053 COMPREHEN METABOLIC PANEL: CPT

## 2022-04-07 LAB
ATRIAL RATE: 65 BPM
P AXIS: 15 DEGREES
PR INTERVAL: 84 MS
QRS AXIS: 147 DEGREES
QRSD INTERVAL: 162 MS
QT INTERVAL: 492 MS
QTC INTERVAL: 515 MS
T WAVE AXIS: 63 DEGREES
VENTRICULAR RATE: 66 BPM

## 2022-04-07 PROCEDURE — 93010 ELECTROCARDIOGRAM REPORT: CPT | Performed by: INTERNAL MEDICINE

## 2022-04-11 ENCOUNTER — APPOINTMENT (OUTPATIENT)
Dept: LAB | Facility: CLINIC | Age: 67
End: 2022-04-11
Payer: MEDICARE

## 2022-04-11 DIAGNOSIS — Z45.42 END OF BATTERY LIFE OF DEEP BRAIN STIMULATOR: ICD-10-CM

## 2022-04-11 DIAGNOSIS — Z96.89 S/P DEEP BRAIN STIMULATOR PLACEMENT: ICD-10-CM

## 2022-04-11 DIAGNOSIS — G25.0 BENIGN ESSENTIAL TREMOR: ICD-10-CM

## 2022-04-11 PROCEDURE — 93005 ELECTROCARDIOGRAM TRACING: CPT

## 2022-04-13 LAB
ATRIAL RATE: 73 BPM
P AXIS: 31 DEGREES
PR INTERVAL: 176 MS
QRS AXIS: 33 DEGREES
QRSD INTERVAL: 102 MS
QT INTERVAL: 400 MS
QTC INTERVAL: 440 MS
T WAVE AXIS: 33 DEGREES
VENTRICULAR RATE: 73 BPM

## 2022-04-13 PROCEDURE — 93010 ELECTROCARDIOGRAM REPORT: CPT | Performed by: INTERNAL MEDICINE

## 2022-04-13 NOTE — PRE-PROCEDURE INSTRUCTIONS
Pre-Surgery Instructions:   Medication Instructions    Ascorbic Acid (Vitamin C) 500 MG CAPS Stop taking 7 days prior to surgery   atorvastatin (LIPITOR) 80 mg tablet Take day of surgery   Coenzyme Q10 60 MG CAPS Stop taking 7 days prior to surgery   diazepam (VALIUM) 10 mg tablet Take day of surgery   levothyroxine 150 mcg tablet Take day of surgery   Multiple Vitamins-Minerals (MULTI FOR HIM 50+ PO) Stop taking 7 days prior to surgery   propranolol (INDERAL) 80 mg tablet Take day of surgery   QUERCETIN PO Stop taking 7 days prior to surgery   tamsulosin (FLOMAX) 0 4 mg pm useage    Turmeric Curcumin 500 MG CAPS Stop taking 7 days prior to surgery   Zinc 30 MG CAPS Stop taking 7 days prior to surgery  Pre Procedure instructions given and verbalized understanding  NPO after MN  Follow instructions for bathing  Morning meds with water  Bring Remote charged

## 2022-04-14 ENCOUNTER — ANESTHESIA (OUTPATIENT)
Dept: PERIOP | Facility: HOSPITAL | Age: 67
End: 2022-04-14
Payer: MEDICARE

## 2022-04-14 ENCOUNTER — HOSPITAL ENCOUNTER (OUTPATIENT)
Facility: HOSPITAL | Age: 67
Setting detail: OUTPATIENT SURGERY
Discharge: HOME | End: 2022-04-14
Attending: NEUROLOGICAL SURGERY | Admitting: NEUROLOGICAL SURGERY
Payer: MEDICARE

## 2022-04-14 ENCOUNTER — ANESTHESIA EVENT (OUTPATIENT)
Dept: PERIOP | Facility: HOSPITAL | Age: 67
End: 2022-04-14
Payer: MEDICARE

## 2022-04-14 VITALS
BODY MASS INDEX: 37.11 KG/M2 | OXYGEN SATURATION: 96 % | DIASTOLIC BLOOD PRESSURE: 93 MMHG | TEMPERATURE: 97 F | RESPIRATION RATE: 16 BRPM | WEIGHT: 280 LBS | SYSTOLIC BLOOD PRESSURE: 144 MMHG | HEART RATE: 65 BPM | HEIGHT: 73 IN

## 2022-04-14 DIAGNOSIS — Z96.89 S/P DEEP BRAIN STIMULATOR PLACEMENT: Primary | ICD-10-CM

## 2022-04-14 PROCEDURE — C1767 GENERATOR, NEURO NON-RECHARG: HCPCS | Performed by: NEUROLOGICAL SURGERY

## 2022-04-14 PROCEDURE — 95977 ALYS CPLX CN NPGT PRGRMG: CPT | Performed by: NEUROLOGICAL SURGERY

## 2022-04-14 PROCEDURE — C1883 ADAPT/EXT, PACING/NEURO LEAD: HCPCS | Performed by: NEUROLOGICAL SURGERY

## 2022-04-14 PROCEDURE — 61886 IMPLANT NEUROSTIM ARRAYS: CPT | Performed by: NEUROLOGICAL SURGERY

## 2022-04-14 PROCEDURE — C1787 PATIENT PROGR, NEUROSTIM: HCPCS | Performed by: NEUROLOGICAL SURGERY

## 2022-04-14 DEVICE — NEUROSTIM DBS PERCPET PC BRAINSENSE: Type: IMPLANTABLE DEVICE | Site: CHEST | Status: FUNCTIONAL

## 2022-04-14 DEVICE — IMPLANTABLE DEVICE: Type: IMPLANTABLE DEVICE | Site: CHEST | Status: FUNCTIONAL

## 2022-04-14 DEVICE — CONNECTOR PLUG SENSIGHT: Type: IMPLANTABLE DEVICE | Site: CHEST | Status: FUNCTIONAL

## 2022-04-14 RX ORDER — SODIUM CHLORIDE, SODIUM LACTATE, POTASSIUM CHLORIDE, CALCIUM CHLORIDE 600; 310; 30; 20 MG/100ML; MG/100ML; MG/100ML; MG/100ML
INJECTION, SOLUTION INTRAVENOUS CONTINUOUS PRN
Status: DISCONTINUED | OUTPATIENT
Start: 2022-04-14 | End: 2022-04-14

## 2022-04-14 RX ORDER — KETOROLAC TROMETHAMINE 30 MG/ML
15 INJECTION, SOLUTION INTRAMUSCULAR; INTRAVENOUS ONCE
Status: DISCONTINUED | OUTPATIENT
Start: 2022-04-14 | End: 2022-04-14 | Stop reason: HOSPADM

## 2022-04-14 RX ORDER — PROPOFOL 10 MG/ML
INJECTION, EMULSION INTRAVENOUS CONTINUOUS PRN
Status: DISCONTINUED | OUTPATIENT
Start: 2022-04-14 | End: 2022-04-14

## 2022-04-14 RX ORDER — SODIUM CHLORIDE, SODIUM LACTATE, POTASSIUM CHLORIDE, CALCIUM CHLORIDE 600; 310; 30; 20 MG/100ML; MG/100ML; MG/100ML; MG/100ML
125 INJECTION, SOLUTION INTRAVENOUS CONTINUOUS
Status: DISCONTINUED | OUTPATIENT
Start: 2022-04-14 | End: 2022-04-14 | Stop reason: HOSPADM

## 2022-04-14 RX ORDER — CEPHALEXIN 500 MG/1
500 CAPSULE ORAL EVERY 6 HOURS SCHEDULED
Qty: 12 CAPSULE | Refills: 0 | Status: SHIPPED | OUTPATIENT
Start: 2022-04-14 | End: 2022-04-17

## 2022-04-14 RX ORDER — CHLORHEXIDINE GLUCONATE 0.12 MG/ML
15 RINSE ORAL ONCE
Status: COMPLETED | OUTPATIENT
Start: 2022-04-14 | End: 2022-04-14

## 2022-04-14 RX ORDER — ACETAMINOPHEN 325 MG/1
650 TABLET ORAL EVERY 6 HOURS PRN
Status: DISCONTINUED | OUTPATIENT
Start: 2022-04-14 | End: 2022-04-14 | Stop reason: HOSPADM

## 2022-04-14 RX ORDER — FENTANYL CITRATE 50 UG/ML
INJECTION, SOLUTION INTRAMUSCULAR; INTRAVENOUS AS NEEDED
Status: DISCONTINUED | OUTPATIENT
Start: 2022-04-14 | End: 2022-04-14

## 2022-04-14 RX ORDER — OXYCODONE HYDROCHLORIDE 5 MG/1
5 TABLET ORAL EVERY 4 HOURS PRN
Qty: 5 TABLET | Refills: 0 | Status: SHIPPED | OUTPATIENT
Start: 2022-04-14 | End: 2022-04-17

## 2022-04-14 RX ORDER — SODIUM CHLORIDE 9 MG/ML
100 INJECTION, SOLUTION INTRAVENOUS CONTINUOUS
Status: DISCONTINUED | OUTPATIENT
Start: 2022-04-14 | End: 2022-04-14 | Stop reason: HOSPADM

## 2022-04-14 RX ORDER — OXYCODONE HYDROCHLORIDE 5 MG/1
5 TABLET ORAL EVERY 4 HOURS PRN
Status: DISCONTINUED | OUTPATIENT
Start: 2022-04-14 | End: 2022-04-14 | Stop reason: HOSPADM

## 2022-04-14 RX ORDER — PROPOFOL 10 MG/ML
INJECTION, EMULSION INTRAVENOUS AS NEEDED
Status: DISCONTINUED | OUTPATIENT
Start: 2022-04-14 | End: 2022-04-14

## 2022-04-14 RX ORDER — ONDANSETRON 2 MG/ML
4 INJECTION INTRAMUSCULAR; INTRAVENOUS EVERY 6 HOURS PRN
Status: DISCONTINUED | OUTPATIENT
Start: 2022-04-14 | End: 2022-04-14 | Stop reason: HOSPADM

## 2022-04-14 RX ORDER — CALCIUM CARBONATE 200(500)MG
1000 TABLET,CHEWABLE ORAL DAILY PRN
Status: DISCONTINUED | OUTPATIENT
Start: 2022-04-14 | End: 2022-04-14 | Stop reason: HOSPADM

## 2022-04-14 RX ORDER — LIDOCAINE HYDROCHLORIDE 20 MG/ML
INJECTION, SOLUTION EPIDURAL; INFILTRATION; INTRACAUDAL; PERINEURAL AS NEEDED
Status: DISCONTINUED | OUTPATIENT
Start: 2022-04-14 | End: 2022-04-14

## 2022-04-14 RX ORDER — BUPIVACAINE HYDROCHLORIDE AND EPINEPHRINE 2.5; 5 MG/ML; UG/ML
INJECTION, SOLUTION EPIDURAL; INFILTRATION; INTRACAUDAL; PERINEURAL AS NEEDED
Status: DISCONTINUED | OUTPATIENT
Start: 2022-04-14 | End: 2022-04-14 | Stop reason: HOSPADM

## 2022-04-14 RX ADMIN — SODIUM CHLORIDE, SODIUM LACTATE, POTASSIUM CHLORIDE, AND CALCIUM CHLORIDE 125 ML/HR: .6; .31; .03; .02 INJECTION, SOLUTION INTRAVENOUS at 12:07

## 2022-04-14 RX ADMIN — PROPOFOL 100 MCG/KG/MIN: 10 INJECTION, EMULSION INTRAVENOUS at 13:07

## 2022-04-14 RX ADMIN — PROPOFOL 30 MG: 10 INJECTION, EMULSION INTRAVENOUS at 13:21

## 2022-04-14 RX ADMIN — FENTANYL CITRATE 25 MCG: 50 INJECTION INTRAMUSCULAR; INTRAVENOUS at 13:34

## 2022-04-14 RX ADMIN — PROPOFOL 30 MG: 10 INJECTION, EMULSION INTRAVENOUS at 13:07

## 2022-04-14 RX ADMIN — FENTANYL CITRATE 50 MCG: 50 INJECTION INTRAMUSCULAR; INTRAVENOUS at 13:14

## 2022-04-14 RX ADMIN — CHLORHEXIDINE GLUCONATE 15 ML: 1.2 SOLUTION ORAL at 12:07

## 2022-04-14 RX ADMIN — PROPOFOL 30 MG: 10 INJECTION, EMULSION INTRAVENOUS at 13:10

## 2022-04-14 RX ADMIN — CEFAZOLIN SODIUM 3000 MG: 10 INJECTION, POWDER, FOR SOLUTION INTRAVENOUS at 13:10

## 2022-04-14 RX ADMIN — FENTANYL CITRATE 25 MCG: 50 INJECTION INTRAMUSCULAR; INTRAVENOUS at 13:25

## 2022-04-14 RX ADMIN — SODIUM CHLORIDE, SODIUM LACTATE, POTASSIUM CHLORIDE, AND CALCIUM CHLORIDE: .6; .31; .03; .02 INJECTION, SOLUTION INTRAVENOUS at 12:31

## 2022-04-14 RX ADMIN — LIDOCAINE HYDROCHLORIDE 100 MG: 20 INJECTION, SOLUTION EPIDURAL; INFILTRATION; INTRACAUDAL; PERINEURAL at 13:07

## 2022-04-14 RX ADMIN — PROPOFOL 30 MG: 10 INJECTION, EMULSION INTRAVENOUS at 13:24

## 2022-04-14 NOTE — ANESTHESIA PREPROCEDURE EVALUATION
Procedure:  Replacement implantable pulse generator for deep brain stimulator, left chest (Left Chest)    Relevant Problems   No relevant active problems        Physical Exam    Airway    Mallampati score: III  TM Distance: <3 FB  Neck ROM: full     Dental   No notable dental hx     Cardiovascular  Cardiovascular exam normal    Pulmonary  Pulmonary exam normal     Other Findings        Anesthesia Plan  ASA Score- 2     Anesthesia Type- IV sedation with anesthesia with ASA Monitors  Additional Monitors:   Airway Plan:           Plan Factors-Exercise tolerance (METS): >4 METS  Chart reviewed  EKG reviewed  Existing labs reviewed  Patient summary reviewed  Patient is not a current smoker  Induction- intravenous  Postoperative Plan-     Informed Consent- Anesthetic plan and risks discussed with patient and spouse  Propanolol taken today, NPO appropriate   MAC

## 2022-04-14 NOTE — OP NOTE
Neurosurgery Operative Room Note    Girma Hernandez  4/14/2022    Pre-op Diagnosis:   Benign essential tremor [G25 0]  S/P deep brain stimulator placement [Z96 89]  End of battery life of deep brain stimulator [Z45 42]    Post-op Dignosis:     Post-Op Diagnosis Codes: * Benign essential tremor [G25 0]     * S/P deep brain stimulator placement [Z96 89]     * End of battery life of deep brain stimulator [Z45 42]    Procedure:  Procedure(s):  Replacement implantable pulse generator for deep brain stimulator, left chest    Model: SN Isma [id number]   00862 1x4 Pocket Adaptor Lot # [id number]    Surgeon: Surgeon(s) and Role:     * Erwin Kulkarni MD - Primary     Anesthesia: Local and IV sedation    Staff:   Circulator: Hazle Paget, RN  Relief Scrub: Ines Llanes RN  Scrub Person: Bebe Sy CST  No qualified Resident was available  Estimated Blood Loss: Minimal      OR note:      Details of Procedure    The patient was brought to the OR and successfully induced under IV sedation  The patient was positioned supine  The IPG unit was turned off  The patient's prior left chest incision was marked  The patient was prepped and draped in sterile fashion, a timeout was performed  The patient was given Abx per protocol  The incision site was infiltrated with local anesthetic  I began by making an incision along the old scar with a skin scalpel  I then used monopolar cautery to dissect down to the generator  I removed the old generator (Jr lopes, 30221, SN [id number]) from the pocket, and this was disconnected from the dual plug electrode using the proprietary screwdriver  The new Percept generator was brought into the field  The new dual channel generator had one channel filled with a connector plug (Lot [id number], Ref [id number]), and the single lead from the Pocket adaptor inserted into the IPG, which was secured with the screwdriver   The dual plug from the extension was inserted into the 2 receptacles of the adaptor, and then secured with the   I placed the new  generator partway into the pocket  Impedances were tested and they were found to be within normal limits  The pocket was irrigated, and then the IPG, connectors, etc , were all fully seated into the pocket  I then proceeded to close in layers with 2-0 Vicryl for the deeper tissues and subcuticular monocryl for the skin  The incision was cleaned and dressed in sterile fashion with steri strips and a Mepilex  Impedances were rechecked and were within normal limits  The IPG was restored to prior programming: C+, O-; 3 2 V = 3 8 mA; 90 us; 185 Hz         Renny Catherine MD     Date: 4/14/2022  Time: 2:00 PM

## 2022-04-14 NOTE — ANESTHESIA POSTPROCEDURE EVALUATION
Post-Op Assessment Note    CV Status:  Stable  Pain Score: 0    Pain management: adequate     Mental Status:  Alert   Hydration Status:  Stable   PONV Controlled:  None   Airway Patency:  Patent      Post Op Vitals Reviewed: Yes      Staff: Anesthesiologist   Reason for prolonged intubation > 24 hours:  N/aReason for prolonged intubation > 48 hours:  N/a      No complications documented      /68 (04/14/22 1405)    Temp 97 8 °F (36 6 °C) (04/14/22 1405)    Pulse 70 (04/14/22 1405)   Resp 12 (04/14/22 1405)    SpO2 94 % (04/14/22 1405)

## 2022-04-14 NOTE — DISCHARGE INSTRUCTIONS
Discharge Instructions  Deep Brain Stimulator (phase 2)  Implantable Pulse Generator (IPG/battery) placement    Activity:  1  Do not lift more than 10 pounds for 6 weeks  2  Avoid bending, lifting and twisting for 6 weeks  3  No driving for at least 2 weeks or until cleared by Neurosurgery  4  When able to shower, continue to use clean towel and washcloth for 2 weeks post-op  5  Do not use a hair dryer, and avoid hair products such as mousse, oils, and gels  Do not brush your hair away from the incision since this will put strain on the suture line  6  Do not dye or perm hair for 6 weeks or until cleared by MD   7  Continue to change bed linens and pajamas more frequently  Wear clean clothes daily  8  Stimulator programming will occur with your neurologist approximately 2 weeks following this procedure, call to verify your scheduled appointments  9  Continue home medications for Parkinson's or essential tremor  Surgical incision care:  1  Keep dressings in place for 3 days  2  Keep incisions dry for 3 days  3  May allow clean water to flow over incisions after 3 days  4  Do not immerse the incisions in water for 4 weeks  5  After 3 days, incisions may be left open to air, but should remain clean  6  Do not apply any creams or ointments to the incision, unless otherwise instructed by [address] [place] [address]  7  Contact office if increasing redness, drainage, pain or swelling occurs around the incisions or if you develop a fever greater than 101F  8  Do not dye/perm hair or use any hair products anticipated 6 weeks or until cleared by Neurosurgery  Postoperative medication:  1  Complete course of 3 day postoperative course of antibiotic as directed  2  [facility] will provide pain medication in the postoperative period  All prescriptions must come from a single provider  a  Take medications as prescribed  Call office with any questions/concerns    b  May use over the counter Tylenol  No non steroidal anti-inflammatory drugs (NSAIDs) (ie  Ibuprofen, Aleve, Advil, Naproxen)  3  Please contact office for questions regarding dosage and modifications  4  No antiplatelet or  anticoagulation medication until cleared by [address], unless otherwise instructed  5  Do not operate heavy machinery or vehicles while taking sedating medications  6  Use a bowel regimen while on opioids as they induce constipation  Ie  Senokot-S, Miralax, Colace, etc  Increase fiber and water intake

## 2022-04-15 ENCOUNTER — TELEPHONE (OUTPATIENT)
Dept: NEUROSURGERY | Facility: CLINIC | Age: 67
End: 2022-04-15

## 2022-04-27 ENCOUNTER — TELEPHONE (OUTPATIENT)
Dept: NEUROLOGY | Facility: CLINIC | Age: 67
End: 2022-04-27

## 2022-04-27 NOTE — TELEPHONE ENCOUNTER
pt called and states that he had a new battery placed a few weeks ago for his DBS and needs to make an appt for adjustment      please call him at [phone] to leave detailed message

## 2022-04-28 ENCOUNTER — CLINICAL SUPPORT (OUTPATIENT)
Dept: NEUROSURGERY | Facility: CLINIC | Age: 67
End: 2022-04-28

## 2022-04-28 ENCOUNTER — TELEPHONE (OUTPATIENT)
Dept: NEUROLOGY | Facility: CLINIC | Age: 67
End: 2022-04-28

## 2022-04-28 VITALS
TEMPERATURE: 98.4 F | SYSTOLIC BLOOD PRESSURE: 124 MMHG | HEIGHT: 73 IN | WEIGHT: 287 LBS | BODY MASS INDEX: 38.04 KG/M2 | DIASTOLIC BLOOD PRESSURE: 82 MMHG

## 2022-04-28 DIAGNOSIS — Z48.89 AFTERCARE FOLLOWING SURGERY FOR INJURY AND TRAUMA: Primary | ICD-10-CM

## 2022-04-28 PROCEDURE — 99024 POSTOP FOLLOW-UP VISIT: CPT

## 2022-04-28 PROCEDURE — 1124F ACP DISCUSS-NO DSCNMKR DOCD: CPT

## 2022-04-28 NOTE — TELEPHONE ENCOUNTER
Called pt and spoke to Shelley Parra in regards to scheduling a DBS appt for Mena Jenkins as requested  I then offered the first DBS opening we had which was on 05/12/2022 at 7:30 AM in the [facility] location  Patient agreed to this appt time but asked if there is any later time? I informed the patient that there are no appts available within the next 2 months for me to get them in for a later time  Patient agreed to the appt and I informed the patient I will call if any later times open

## 2022-04-28 NOTE — PROGRESS NOTES
Post-Op Visit- Neurosurgery    Jay Fletcher 74 y o  male MRN: [id number]    Chief Complaint:   Patient presents post: Replacement implantable pulse generator for deep brain stimulator, left chest - Left    History of Present Illness:  Patient presents for 2 week POV for incision check accompanied by spouse and ambulating without an assistive device  Patient reports he is doing well overall and denies any incisional issues or fevers  He is able to ambulate and complete ADLs independently  He is scheduled to see neurology soon  Patient denies any complaints        Current Outpatient Medications:     Ascorbic Acid (Vitamin C) 500 MG CAPS, Take by mouth, Disp: , Rfl:     atorvastatin (LIPITOR) 80 mg tablet, Take 80 mg by mouth daily, Disp: , Rfl:     Coenzyme Q10 60 MG CAPS, every 24 hours, Disp: , Rfl:     diazepam (VALIUM) 10 mg tablet, Take 1 tablet (10 mg total) by mouth daily, Disp: 90 tablet, Rfl: 1    levothyroxine 150 mcg tablet, Take 150 mcg by mouth daily, Disp: , Rfl:     Multiple Vitamins-Minerals (MULTI FOR HIM 50+ PO), as directed, Disp: , Rfl:     propranolol (INDERAL) 80 mg tablet, Take 80 mg by mouth daily, Disp: , Rfl:     tamsulosin (FLOMAX) 0 4 mg, Take 1 capsule (0 4 mg total) by mouth daily with dinner, Disp: 90 capsule, Rfl: 3    Zinc 30 MG CAPS, every 24 hours, Disp: , Rfl:     Misc Natural Products (Prostate Support) 300-15 MG TABS, as directed (Patient not taking: Reported on 3/9/2022), Disp: , Rfl:     QUERCETIN PO, Take by mouth, Disp: , Rfl:     Turmeric Curcumin 500 MG CAPS, Take by mouth (Patient not taking: Reported on 4/28/2022 ), Disp: , Rfl:      Allergies   Allergen Reactions    Meperidine Itching     Other reaction(s): itch          Assessment:    Vitals:    04/28/22 1300   BP: 124/82   Temp: 98 4 °F (36 9 °C)   TempSrc: Tympanic   Weight: 130 kg (287 lb)   Height: 6' 1" (1 854 m)        Wound Exam: Incision well approximated  No erythema, edema or drainage present  Location: left chest incision  Complications: None  Incision HEATHER  Discussion/Summary:  Reviewed incision care with patient including daily observation for s/s infection including: increased erythema, edema, drainage, dehiscence of incision or fever >101  Should these be observed, he understands that he is to call and/or return immediately for reassessment  Advised patient to continue cleansing area with mild soap and water and pat dry  Not to apply any lotions, creams, or ointments, & not to submerge in any water for two more weeks  Patient is not to lift anything greater than 10 lbs until after 6 week post op  Activity levels were also reviewed with the patient in detail, he is to slowly increase his level of activity and ambulation is encouraged as tolerated  Patient may return back to driving as long as he feels safe and comfortable doing so  Patient is not taking any narcotic pain medication  Patient will follow up in the office on a PRN basis  He is scheduled for follow up with neurology  Reminded patient to call the office with any further questions or concerns, or if any incisional issues or fevers would arise

## 2022-05-09 ENCOUNTER — TELEPHONE (OUTPATIENT)
Dept: NEUROLOGY | Facility: CLINIC | Age: 67
End: 2022-05-09

## 2022-05-09 NOTE — TELEPHONE ENCOUNTER
Pt's wife calling in. Pt needs to cancel 22 appt with you due to death in the family. And  services are on the day of his scheduled appt for DBS follow up.     Your schedule is full. Can you offer an appt day and time for follow up?    Scheduling could not find anything until 2023.    Please advise.  Thank you.  CB# [phone]    Or [phone]

## 2022-05-10 ENCOUNTER — TELEPHONE (OUTPATIENT)
Dept: NEUROLOGY | Facility: CLINIC | Age: 67
End: 2022-05-10

## 2022-05-10 NOTE — TELEPHONE ENCOUNTER
Called pt and spoke to Jsutin in regards to offering a new appt date as requested  I did offer the next opening we had which was on 06/09/2022  Justin agreed to the appt date and time and is now scheduled

## 2022-05-10 NOTE — TELEPHONE ENCOUNTER
We can try moving up a patient from another Thursday morning (perhaps look out 2-3 weeks to give him time) to 5/12 to switch with him.

## 2022-05-10 NOTE — TELEPHONE ENCOUNTER
Called pt and spoke to Ramila in regards to offering a new appt date as requested. I did offer the next opening we had which was on 06/09/2022. Ramila agreed to the appt date and time and is now scheduled.

## 2022-05-25 ENCOUNTER — OFFICE VISIT (OUTPATIENT)
Dept: UROLOGY | Facility: HOSPITAL | Age: 67
End: 2022-05-25
Payer: MEDICARE

## 2022-05-25 VITALS
BODY MASS INDEX: 37.91 KG/M2 | HEART RATE: 82 BPM | OXYGEN SATURATION: 96 % | SYSTOLIC BLOOD PRESSURE: 142 MMHG | WEIGHT: 286 LBS | HEIGHT: 73 IN | DIASTOLIC BLOOD PRESSURE: 88 MMHG

## 2022-05-25 DIAGNOSIS — R97.20 ELEVATED PSA: Primary | ICD-10-CM

## 2022-05-25 DIAGNOSIS — N40.1 BENIGN PROSTATIC HYPERPLASIA WITH LOWER URINARY TRACT SYMPTOMS, SYMPTOM DETAILS UNSPECIFIED: ICD-10-CM

## 2022-05-25 PROCEDURE — 99213 OFFICE O/P EST LOW 20 MIN: CPT | Performed by: NURSE PRACTITIONER

## 2022-05-25 NOTE — PROGRESS NOTES
5/25/2022    Liz Mccurdy  1955  [id number]      Assessment  -Elevated PSA s/p negative prostate biopsy (11/23/2021)  -BPH with lower urinary tract symptoms  -Erectile dysfunction     Discussion/Plan  Avinash Aguilar is a 74 y o  male being managed by Dr Janet Barreto  1  Erectile dysfunction- we discussed discontinuing tamsulosin due to reported side effects of retrograde ejaculation and erectile dysfunction  We also reviewed that medications such as benzodiazepines can have side effects of ED  if symptoms of erectile dysfunction continue, we discussed trialing an oral PDE5 inhibitor  Reviewed mechanism of action, potential side effects, and administration instructions  Patient will call our office if he is interested in prescription  2  BPH with lower urinary tract symptoms- he has no urinary complaints at this time  Patient wishes to discontinue tamsulosin due to side effects as mentioned below  If his urinary symptoms return, he will restart medication  Reviewed dietary behavior modifications  Patient will follow-up in November 2022 with routine PSA and BREANA  He was advised to call sooner with any questions or issues     -All questions answered, patient agrees with plan      History of Present Illness  74 y o  male with a history of elevated PSA, BPH, and erectile dysfunction presents today for follow up  Patient last seen in the office in November 2021  He underwent prostate biopsy for evaluation of elevated PSA which was negative for malignancy  His PSA was 5 0  Patient started on tamsulosin 0 4 mg HS around time of biopsy  He has since been experiencing symptoms of retrograde ejaculation and difficulties maintaining an erection  Patient reports improvement in his urinary symptoms and denies any gross hematuria or dysuria  No additional changes to his overall health  Review of Systems  Review of Systems   Constitutional: Negative  HENT: Negative  Respiratory: Negative  Cardiovascular: Negative  Gastrointestinal: Negative  Genitourinary: Negative for decreased urine volume, difficulty urinating, dysuria, flank pain, frequency, hematuria and urgency  Musculoskeletal: Negative  Skin: Negative  Neurological: Negative  Psychiatric/Behavioral: Negative  AUA SYMPTOM SCORE    Flowsheet Row Most Recent Value   AUA SYMPTOM SCORE    How often have you had a sensation of not emptying your bladder completely after you finished urinating? 1   How often have you had to urinate again less than two hours after you finished urinating? 0   How often have you found you stopped and started again several times when you urinate? 1   How often have you found it difficult to postpone urination? 1   How often have you had a weak urinary stream? 2   How often have you had to push or strain to begin urination? 2   How many times did you most typically get up to urinate from the time you went to bed at night until the time you got up in the morning?  1   Quality of Life: If you were to spend the rest of your life with your urinary condition just the way it is now, how would you feel about that? 3   AUA SYMPTOM SCORE 8          Past Medical History  Past Medical History:   Diagnosis Date    Cancer [facility] [address] )     melonoma shoulder    Disease of thyroid gland     Enlarged prostate     Hemorrhoids     Hyperlipidemia     [facility] [place]     Obesity     Tremor        Past Social History  Past Surgical History:   Procedure Laterality Date    COLONOSCOPY      INSERTION / PLACEMENT / REVISION NEUROSTIMULATOR      OH IMP STIM,CRANIAL,SUBQ,>1 ARRAY Left 8/17/2018    Procedure: REPLACEMENT left chest DEEP BRAIN STIMULATION GENERATOR;  Surgeon: Katheryn Easley MD;  Location:  MAIN OR;  Service: Neurosurgery    OH IMP STIM,CRANIAL,SUBQ,>1 ARRAY Left 4/14/2022    Procedure: Replacement implantable pulse generator for deep brain stimulator, left chest;  Surgeon: Bonita Thakkar MD; Location: [facility];  Service: Neurosurgery    SKIN SURGERY         Past Family History  Family History   Problem Relation Age of Onset    Stroke Mother     Anuerysm Father        Past Social history  Social History     Socioeconomic History    Marital status: /Civil Union     Spouse name: Not on file    Number of children: Not on file    Years of education: Not on file    Highest education level: Not on file   Occupational History    Not on file   Tobacco Use    Smoking status: Never Smoker    Smokeless tobacco: Former User    Tobacco comment: used chewing tobacco "many many years ago"   Vaping Use    Vaping Use: Never used   Substance and Sexual Activity    Alcohol use: Yes     Comment: social    Drug use: No    Sexual activity: Yes   Other Topics Concern    Not on file   Social History Narrative    Not on file     Social Determinants of Health     Financial Resource Strain: Not on file   Food Insecurity: Not on file   Transportation Needs: Not on file   Physical Activity: Not on file   Stress: Not on file   Social Connections: Not on file   Intimate Partner Violence: Not on file   Housing Stability: Not on file       Current Medications  Current Outpatient Medications   Medication Sig Dispense Refill    Ascorbic Acid (Vitamin C) 500 MG CAPS Take by mouth      atorvastatin (LIPITOR) 80 mg tablet Take 80 mg by mouth daily      Coenzyme Q10 60 MG CAPS every 24 hours      diazepam (VALIUM) 10 mg tablet Take 1 tablet (10 mg total) by mouth daily 90 tablet 1    levothyroxine 150 mcg tablet Take 150 mcg by mouth daily      Misc Natural Products (Prostate Support) 300-15 MG TABS as directed (Patient not taking: Reported on 3/9/2022)      Multiple Vitamins-Minerals (MULTI FOR HIM 50+ PO) as directed      propranolol (INDERAL) 80 mg tablet Take 80 mg by mouth daily      QUERCETIN PO Take by mouth      tamsulosin (FLOMAX) 0 4 mg Take 1 capsule (0 4 mg total) by mouth daily with dinner 90 capsule 3    Turmeric Curcumin 500 MG CAPS Take by mouth (Patient not taking: Reported on 4/28/2022 )      Zinc 30 MG CAPS every 24 hours       No current facility-administered medications for this visit  Allergies  Allergies   Allergen Reactions    Meperidine Itching     Other reaction(s): itch         Past Medical History, Social History, Family History, medications and allergies were reviewed  Vitals  There were no vitals filed for this visit  Physical Exam  Physical Exam  Constitutional:       Appearance: Normal appearance  He is well-developed  HENT:      Head: Normocephalic  Eyes:      Pupils: Pupils are equal, round, and reactive to light  Pulmonary:      Effort: Pulmonary effort is normal    Abdominal:      Palpations: Abdomen is soft  Musculoskeletal:         General: Normal range of motion  Cervical back: Normal range of motion  Skin:     General: Skin is warm and dry  Neurological:      General: No focal deficit present  Mental Status: He is alert and oriented to person, place, and time  Psychiatric:         Mood and Affect: Mood normal          Behavior: Behavior normal          Thought Content: Thought content normal          Judgment: Judgment normal          Results    I have personally reviewed all pertinent lab results and reviewed with patient  Lab Results   Component Value Date    PSA 5 0 (H) 04/05/2022    PSA 5 0 (H) 10/08/2021     Lab Results   Component Value Date    GLUCOSE 95 04/22/2014    CALCIUM 9 4 04/05/2022     04/22/2014    K 5 0 04/05/2022    CO2 29 04/05/2022     04/05/2022    BUN 18 04/05/2022    CREATININE 0 97 04/05/2022     Lab Results   Component Value Date    WBC 8 89 04/05/2022    HGB 15 2 04/05/2022    HCT 46 8 04/05/2022    MCV 99 (H) 04/05/2022     04/05/2022     No results found for this or any previous visit (from the past 1 hour(s))  No

## 2022-06-09 ENCOUNTER — OFFICE VISIT (OUTPATIENT)
Dept: NEUROLOGY | Facility: CLINIC | Age: 67
End: 2022-06-09
Payer: MEDICARE

## 2022-06-09 VITALS
HEIGHT: 73 IN | BODY MASS INDEX: 37.37 KG/M2 | WEIGHT: 282 LBS | SYSTOLIC BLOOD PRESSURE: 134 MMHG | HEART RATE: 75 BPM | DIASTOLIC BLOOD PRESSURE: 88 MMHG | TEMPERATURE: 98 F

## 2022-06-09 DIAGNOSIS — G25.0 BENIGN ESSENTIAL TREMOR: ICD-10-CM

## 2022-06-09 PROCEDURE — 99214 OFFICE O/P EST MOD 30 MIN: CPT | Performed by: PSYCHIATRY & NEUROLOGY

## 2022-06-09 PROCEDURE — 95970 ALYS NPGT W/O PRGRMG: CPT | Performed by: PSYCHIATRY & NEUROLOGY

## 2022-06-09 NOTE — ASSESSMENT & PLAN NOTE
Right hand tremor fairly well controlled  Deep brain stimulator interrogated but no changed made  After surgery changes made on Group [employer] using independent channels  Left VIM  Percept  Implanted 4/14/2022  Battery 97%, estim 6yrs 5 month    C+0- (2 8mA), 1-(1 6mA)  2 8V, , 180Hz  Electrode imp all ok    Left hand tremor continues to be bothersome  He continues to contemplate having right lead placement to control left hand  He will let me know when he is ready to proceed  He turns off his device nightly on on each day but has been having trouble with his new patient controller and there is a delay or difficulty with connection  Reached out to [employer] rep  They will have a 3 way call with tech

## 2022-06-09 NOTE — PROGRESS NOTES
Patient ID: Laurel Messina is a 74 y o  male    Assessment/Plan:    Benign essential tremor  Right hand tremor fairly well controlled  Deep brain stimulator interrogated but no changed made  After surgery changes made on Group [employer] using independent channels  Left VIM  Percept  Implanted 4/14/2022  Battery 97%, estim 6yrs 5 month    C+0- (2 8mA), 1-(1 6mA)  2 8V, , 180Hz  Electrode imp all ok    Left hand tremor continues to be bothersome  He continues to contemplate having right lead placement to control left hand  He will let me know when he is ready to proceed  He turns off his device nightly on on each day but has been having trouble with his new patient controller and there is a delay or difficulty with connection  Reached out to [employer] rep  They will have a 3 way call with tech  Diagnoses and all orders for this visit:    Benign essential tremor        Subjective:       Mr Laurel Messina is a right-handed retired  with essential tremor s/p LEFT VIM DBS, left chest placed 3/28/2000 in [place] by Dr Zonia Gonsales, with multiple prior IPG replacements lasting 2-3 years, switched from [facility] to [facility] on 12/20/11, who returns for follow up after IPG replacement 4/14/22  To review, action tremor began gradually, first noted around the 9th grade and progressed overtime  He has been on Valium 10mg daily and Inderal 80mg daily all his life  Previously on gabapentin but this was associated with sedation  Never tried topiramate or primidone  Overtime he has developed a left hand tremor and has contemplated but not proceeded with R lead placement  He is not interested in further medication trials for  tremor  He remains on propranolol 80mg daily, and diazepam 10mg daily  Surgery for IPG replacement with update to Percept went well  Settings changed and longevity estimate no 6 years 5 months  He feels right hand tremor is unchanged  Still has adequate control   No issues with using this hand for eating and daily activities  He does turn in stimulator off at night  He has been having issues getting the patient  connected  He has yet to contact [employer]  Left hand tremor is worse  This is most 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  He still is contemplating having this done in the near future  Objective:    /88   Pulse 75   Temp 98 °F (36 7 °C)   Ht 6' 1" (1 854 m)   Wt 128 kg (282 lb)   BMI 37 21 kg/m²       Physical Exam  Vitals reviewed  Eyes:      Extraocular Movements: Extraocular movements intact  Neurological:      Deep Tendon Reflexes: Strength normal    Psychiatric:         Speech: Speech normal          Neurological Exam  Mental Status   Oriented only to person, place and situation  Recent and remote memory are intact  Speech is normal  Follows complex commands  Attention and concentration are normal     Cranial Nerves  CN II: Right funduscopic exam: not visualized  Left funduscopic exam: not visualized  CN III, IV, VI: Extraocular movements intact bilaterally  CN VII: Full and symmetric facial movement  CN VIII: Hearing is normal   CN XI: Shoulder shrug strength is normal     Motor   Normal muscle tone  Strength is 5/5 throughout all four extremities  Sensory  Light touch is normal in upper and lower extremities  Coordination  Right: Finger-to-nose abnormality: Rapid alternating movement normal Left: Finger-to-nose abnormality: Rapid alternating movement normal   Slight intentional tremor on right FTN  Large amplitude tremor on left FTN  Moderate left postural tremor with hand outstretched  No rest tremor  Rare, no-no head tremor when activated  No vocal or leg tremor       Gait  Casual gait is normal including stance, stride, and arm swing  Able to rise from chair without using arms           Current Outpatient Medications on File Prior to Visit   Medication Sig Dispense Refill    Ascorbic Acid (Vitamin C) 500 MG CAPS Take by mouth      atorvastatin (LIPITOR) 80 mg tablet Take 80 mg by mouth daily      Coenzyme Q10 60 MG CAPS every 24 hours      diazepam (VALIUM) 10 mg tablet Take 1 tablet (10 mg total) by mouth daily 90 tablet 1    levothyroxine 150 mcg tablet Take 150 mcg by mouth daily      Multiple Vitamins-Minerals (MULTI FOR HIM 50+ PO) as directed      propranolol (INDERAL) 80 mg tablet Take 80 mg by mouth daily      Misc Natural Products (Prostate Support) 300-15 MG TABS as directed (Patient not taking: No sig reported)      QUERCETIN PO Take by mouth (Patient not taking: Reported on 6/9/2022)      tamsulosin (FLOMAX) 0 4 mg Take 1 capsule (0 4 mg total) by mouth daily with dinner (Patient not taking: Reported on 6/9/2022) 90 capsule 3    Turmeric Curcumin 500 MG CAPS Take by mouth (Patient not taking: No sig reported)      Zinc 30 MG CAPS every 24 hours (Patient not taking: Reported on 6/9/2022)       No current facility-administered medications on file prior to visit           Titi Nuñez MD  Movement disorder physician  [facility]

## 2022-06-09 NOTE — PROGRESS NOTES
Patient ID: Kayleigh Sanchez is a 74 y o  male  Assessment/Plan:    No problem-specific Assessment & Plan notes found for this encounter  {Assess/PlanSmartLinks:30347}       Subjective:    HPI    {[facility] HPI texts:25779}    {Common ambulatory SmartLinks:68259}         Objective:    Blood pressure 134/88, pulse 75, temperature 98 °F (36 7 °C), height 6' 1" (1 854 m), weight 128 kg (282 lb)  Physical Exam    Neurological Exam      ROS:    Review of Systems   Constitutional: Negative  Negative for appetite change and fever  HENT: Negative  Negative for hearing loss, tinnitus, trouble swallowing and voice change  Eyes: Negative  Negative for photophobia and pain  Respiratory: Negative  Negative for shortness of breath  Cardiovascular: Negative  Negative for palpitations  Gastrointestinal: Negative  Negative for nausea and vomiting  Endocrine: Negative  Negative for cold intolerance  Genitourinary: Negative  Negative for dysuria, frequency and urgency  Musculoskeletal: Negative  Negative for myalgias and neck pain  Skin: Negative  Negative for rash  Allergic/Immunologic: Negative  Neurological: Negative  Negative for dizziness, tremors, seizures, syncope, facial asymmetry, speech difficulty, weakness, light-headedness, numbness and headaches  Hematological: Negative  Does not bruise/bleed easily  Psychiatric/Behavioral: Negative  Negative for confusion, hallucinations and sleep disturbance  All other systems reviewed and are negative      Patient states there are no issues to address today

## 2022-08-10 ENCOUNTER — TELEPHONE (OUTPATIENT)
Dept: GASTROENTEROLOGY | Facility: AMBULARY SURGERY CENTER | Age: 67
End: 2022-08-10

## 2022-08-10 NOTE — TELEPHONE ENCOUNTER
Patients GI provider:  [facility]    Number to return call: ( [phone]    Reason for call: Pt calling to schedule recall colon, not sure of last date or provider, received letter in the mail    Scheduled procedure/appointment date if applicable: N/A

## 2022-08-10 NOTE — TELEPHONE ENCOUNTER
[place] [facility] [place] be [place] there to schedule   Pt received ltr from [facility] not here saying he was due to sched colon

## 2022-08-29 ENCOUNTER — TELEPHONE (OUTPATIENT)
Dept: NEUROLOGY | Facility: CLINIC | Age: 67
End: 2022-08-29

## 2022-08-29 NOTE — TELEPHONE ENCOUNTER
Neurology clearance need for colonoscopy on 09/14/2022  Second request the office states      Thank you

## 2022-09-04 DIAGNOSIS — G25.0 BENIGN FAMILIAL TREMOR: ICD-10-CM

## 2022-09-06 RX ORDER — DIAZEPAM 10 MG/1
TABLET ORAL
Qty: 90 TABLET | Refills: 1 | Status: SHIPPED | OUTPATIENT
Start: 2022-09-06

## 2022-11-16 ENCOUNTER — TELEPHONE (OUTPATIENT)
Dept: NEUROLOGY | Facility: CLINIC | Age: 67
End: 2022-11-16

## 2022-11-16 NOTE — TELEPHONE ENCOUNTER
Tho Dunn has called requesting his records from neurology to be faxed to [address] [place] [address]   Fax number [phone]  Phone # [phone] EXT# 2, 1    He stated he had a Right side DBS done by Dr Jamison years ago and now will be having his left side DBS done with [address] [place] [address] but they need Dr Jamison notes and as much records to be faxed to the above number  I also provided him with our Medical Records phone number  Can you please assist with this ?       Thank you,     Buffy Cooley

## 2022-11-16 NOTE — TELEPHONE ENCOUNTER
Called pt's back and made him aware that I have faxed the last two office visit notes to neurosurgery dr Laila Montalvo ,but if he needs more records he needs to sign AMELIA,also provided # of [phone]  Pt verbalized understanding

## 2022-11-18 ENCOUNTER — TELEPHONE (OUTPATIENT)
Dept: NEUROLOGY | Facility: CLINIC | Age: 67
End: 2022-11-18

## 2022-11-18 NOTE — TELEPHONE ENCOUNTER
Received a medical record request from [address] [place] [address]   Request was sent and received by Medical Record Dept  11/18/22

## 2022-11-30 ENCOUNTER — OFFICE VISIT (OUTPATIENT)
Dept: UROLOGY | Facility: HOSPITAL | Age: 67
End: 2022-11-30

## 2022-11-30 VITALS
BODY MASS INDEX: 38.3 KG/M2 | SYSTOLIC BLOOD PRESSURE: 132 MMHG | WEIGHT: 289 LBS | OXYGEN SATURATION: 96 % | HEIGHT: 73 IN | DIASTOLIC BLOOD PRESSURE: 88 MMHG | HEART RATE: 75 BPM

## 2022-11-30 DIAGNOSIS — N52.9 ERECTILE DYSFUNCTION, UNSPECIFIED ERECTILE DYSFUNCTION TYPE: ICD-10-CM

## 2022-11-30 DIAGNOSIS — Z12.5 ENCOUNTER FOR SCREENING FOR MALIGNANT NEOPLASM OF PROSTATE: ICD-10-CM

## 2022-11-30 DIAGNOSIS — N40.1 BENIGN PROSTATIC HYPERPLASIA WITH LOWER URINARY TRACT SYMPTOMS, SYMPTOM DETAILS UNSPECIFIED: ICD-10-CM

## 2022-11-30 DIAGNOSIS — R97.20 ELEVATED PSA: Primary | ICD-10-CM

## 2022-11-30 RX ORDER — SILDENAFIL CITRATE 20 MG/1
20 TABLET ORAL AS NEEDED
Qty: 10 TABLET | Refills: 0 | Status: SHIPPED | OUTPATIENT
Start: 2022-11-30

## 2022-11-30 NOTE — PROGRESS NOTES
11/30/2022    Comike Mejíaoy  1955 [id number]      Assessment  -Elevated PSA s/p negative prostate biopsy (11/23/2021)  -BPH with lower urinary tract symptoms  -Erectile dysfunction     Discussion/Plan  Avril Alvarado is a 74 y o  male being managed by Dr Saint Nordmann  1  BPH with lower urinary tract symptoms- patient denies any lower urinary tract symptoms  We reviewed dietary and behavioral modifications  2  Prostate cancer screening s/p negative prostate biopsy (11/2021)- we reviewed the results of his last PSA from 04/05/2022 which was 5 0, previously 5 0  We discussed obtaining a current PSA  Provided patient with order for lab work  We will call with his results  We discussed that if PSA remains elevated, would recommend proceeding with multiparametric MRI of prostate  He verbalizes understanding  If PSA stable, plan to follow-up in 6 months with repeat PSA and BREANA  3  Erectile dysfunction- patient interested in trialing oral PDE 5 inhibitor  Prescription for sildenafil 20 mg was sent to his pharmacy  He may take 1-3 tablets as needed prior to sexual activity  Reviewed mechanism of action, potential side effects, and administration instructions    Call with results of PSA  He was advised to call sooner with any questions or issues       -All questions answered, patient agrees with plan      History of Present Illness  74 y o  male with a history of BPH, ED and prostate cancer screening presents today for follow up  Patient last seen in the office in May 2022  He had discontinued tamsulosin due to side effect of retrograde ejaculation  Patient denies any lower urinary tract symptoms, gross hematuria, or dysuria  Last PSA from April 2022 was 5 0  He underwent negative prostate biopsy in November 2011  Patient denies any strong family history of prostate malignancy  He states that since prostate biopsy, he has been experiencing difficulties maintaining an erection    Patient has never tried an oral PDE5 inhibitor  Review of Systems  Review of Systems   Constitutional: Negative  HENT: Negative  Respiratory: Negative  Cardiovascular: Negative  Gastrointestinal: Negative  Genitourinary: Negative for decreased urine volume, difficulty urinating, dysuria, flank pain, frequency, hematuria and urgency  Musculoskeletal: Negative  Skin: Negative  Neurological: Negative  Psychiatric/Behavioral: Negative  AUA SYMPTOM SCORE    Flowsheet Row Most Recent Value   AUA SYMPTOM SCORE    How often have you had a sensation of not emptying your bladder completely after you finished urinating? 1   How often have you had to urinate again less than two hours after you finished urinating? 0   How often have you found you stopped and started again several times when you urinate? 0   How often have you found it difficult to postpone urination? 0   How often have you had a weak urinary stream? 1   How often have you had to push or strain to begin urination? 0   How many times did you most typically get up to urinate from the time you went to bed at night until the time you got up in the morning?  1   Quality of Life: If you were to spend the rest of your life with your urinary condition just the way it is now, how would you feel about that? 2   AUA SYMPTOM SCORE 3          Past Medical History  Past Medical History:   Diagnosis Date   • Cancer ([facility] )     melonoma shoulder   • Disease of thyroid gland    • Enlarged prostate    • Hemorrhoids    • Hyperlipidemia    • [facility] )    • Obesity    • Tremor        Past Social History  Past Surgical History:   Procedure Laterality Date   • COLONOSCOPY     • INSERTION / PLACEMENT / REVISION NEUROSTIMULATOR     • MT IMP STIM,CRANIAL,SUBQ,>1 ARRAY Left 8/17/2018    Procedure: REPLACEMENT left chest DEEP BRAIN STIMULATION GENERATOR;  Surgeon: Kiran Granda MD;  Location:  MAIN OR;  Service: Neurosurgery   • MT IMP STIM,CRANIAL,SUBQ,>1 ARRAY Left 4/14/2022 Procedure: Replacement implantable pulse generator for deep brain stimulator, left chest;  Surgeon: Walter Tay MD;  Location: [facility] MA[facility];  Service: Neurosurgery   • SKIN SURGERY         Past Family History  Family History   Problem Relation Age of Onset   • Stroke Mother    • Anuerysm Father        Past Social history  Social History     Socioeconomic History   • Marital status: /Civil Union     Spouse name: Not on file   • Number of children: Not on file   • Years of education: Not on file   • Highest education level: Not on file   Occupational History   • Not on file   Tobacco Use   • Smoking status: Never   • Smokeless tobacco: Former   • Tobacco comments:     used chewing tobacco "many many years ago"   Vaping Use   • Vaping Use: Never used   Substance and Sexual Activity   • Alcohol use: Yes     Comment: social   • Drug use: No   • Sexual activity: Yes   Other Topics Concern   • Not on file   Social History Narrative   • Not on file     Social Determinants of Health     Financial Resource Strain: Not on file   Food Insecurity: Not on file   Transportation Needs: Not on file   Physical Activity: Not on file   Stress: Not on file   Social Connections: Not on file   Intimate Partner Violence: Not on file   Housing Stability: Not on file       Current Medications  Current Outpatient Medications   Medication Sig Dispense Refill   • Ascorbic Acid (Vitamin C) 500 MG CAPS Take by mouth     • atorvastatin (LIPITOR) 80 mg tablet Take 80 mg by mouth daily     • Coenzyme Q10 60 MG CAPS every 24 hours     • diazepam (VALIUM) 10 mg tablet TAKE ONE TABLET BY MOUTH EVERY DAY 90 tablet 1   • levothyroxine 150 mcg tablet Take 150 mcg by mouth daily     • Misc Natural Products (Prostate Support) 300-15 MG TABS as directed (Patient not taking: No sig reported)     • Multiple Vitamins-Minerals (MULTI FOR HIM 50+ PO) as directed     • propranolol (INDERAL) 80 mg tablet Take 80 mg by mouth daily     • QUERCETIN PO Take by mouth (Patient not taking: Reported on 6/9/2022)     • tamsulosin (FLOMAX) 0 4 mg Take 1 capsule (0 4 mg total) by mouth daily with dinner (Patient not taking: Reported on 6/9/2022) 90 capsule 3   • Turmeric Curcumin 500 MG CAPS Take by mouth (Patient not taking: No sig reported)     • Zinc 30 MG CAPS every 24 hours (Patient not taking: Reported on 6/9/2022)       No current facility-administered medications for this visit  Allergies  Allergies   Allergen Reactions   • Meperidine Itching     Other reaction(s): itch         Past Medical History, Social History, Family History, medications and allergies were reviewed  Vitals  There were no vitals filed for this visit  Physical Exam  Physical Exam  Constitutional:       Appearance: Normal appearance  He is well-developed  HENT:      Head: Normocephalic  Eyes:      Pupils: Pupils are equal, round, and reactive to light  Pulmonary:      Effort: Pulmonary effort is normal    Abdominal:      Palpations: Abdomen is soft  Musculoskeletal:         General: Normal range of motion  Cervical back: Normal range of motion  Skin:     General: Skin is warm and dry  Neurological:      General: No focal deficit present  Mental Status: He is alert and oriented to person, place, and time  Psychiatric:         Mood and Affect: Mood normal          Behavior: Behavior normal          Thought Content:  Thought content normal          Judgment: Judgment normal          Results    I have personally reviewed all pertinent lab results and reviewed with patient  Lab Results   Component Value Date    PSA 5 0 (H) 04/05/2022    PSA 5 0 (H) 10/08/2021     Lab Results   Component Value Date    GLUCOSE 95 04/22/2014    CALCIUM 9 4 04/05/2022     04/22/2014    K 5 0 04/05/2022    CO2 29 04/05/2022     04/05/2022    BUN 18 04/05/2022    CREATININE 0 97 04/05/2022     Lab Results   Component Value Date    WBC 8 89 04/05/2022    HGB 15 2 04/05/2022 HCT 46 8 04/05/2022    MCV 99 (H) 04/05/2022     04/05/2022     No results found for this or any previous visit (from the past 1 hour(s))

## 2023-01-02 ENCOUNTER — HOSPITAL ENCOUNTER (EMERGENCY)
Facility: HOSPITAL | Age: 68
Discharge: HOME | End: 2023-01-02
Attending: EMERGENCY MEDICINE

## 2023-01-02 VITALS
TEMPERATURE: 97.8 F | WEIGHT: 250 LBS | DIASTOLIC BLOOD PRESSURE: 78 MMHG | HEIGHT: 73 IN | RESPIRATION RATE: 16 BRPM | BODY MASS INDEX: 33.13 KG/M2 | HEART RATE: 84 BPM | OXYGEN SATURATION: 95 % | SYSTOLIC BLOOD PRESSURE: 119 MMHG

## 2023-01-02 DIAGNOSIS — U07.1 COVID-19: Primary | ICD-10-CM

## 2023-01-02 DIAGNOSIS — R68.89 FLU-LIKE SYMPTOMS: ICD-10-CM

## 2023-01-02 LAB
FLUAV RNA RESP QL NAA+PROBE: NEGATIVE
FLUBV RNA RESP QL NAA+PROBE: NEGATIVE
RSV RNA RESP QL NAA+PROBE: NEGATIVE
SARS-COV-2 RNA RESP QL NAA+PROBE: POSITIVE

## 2023-01-02 RX ORDER — MOLNUPIRAVIR 200 MG/1
800 CAPSULE ORAL EVERY 12 HOURS
Qty: 40 CAPSULE | Refills: 0 | Status: SHIPPED | OUTPATIENT
Start: 2023-01-02 | End: 2023-01-07

## 2023-01-02 RX ORDER — BUDESONIDE 180 UG/1
2 AEROSOL, POWDER RESPIRATORY (INHALATION) 2 TIMES DAILY
Qty: 1 EACH | Refills: 0 | Status: SHIPPED | OUTPATIENT
Start: 2023-01-02

## 2023-01-02 NOTE — DISCHARGE INSTRUCTIONS
Take 2,000 U of Vitamin D daily, use budesonide as prescribed, monitor pulse-oximeter at home and if reading is 90% or below with walking or at rest, please return to the ER  Follow up with your PCP tomorrow

## 2023-01-02 NOTE — ED PROVIDER NOTES
History  Chief Complaint   Patient presents with   • Flu Symptoms     Started 2 days ago, cough/congestion/SOB/chills; denies CP; last took 500mg tylenol PTA     Patient is a 74year old M with a pmhx benign essential tremor, s/p deep brain stimulator, HLD who presents with cough, congestion, body aches, fatigue, subjective fevers and chills that started on 12/31/22  States that he is bringing up clear sputum with the cough  Denies shortness of breath at baseline- just when he is coughing  States that he gets "coughing fits"  Prior to Admission Medications   Prescriptions Last Dose Informant Patient Reported? Taking?    Ascorbic Acid (Vitamin C) 500 MG CAPS   Yes No   Sig: Take by mouth   Coenzyme Q10 60 MG CAPS   Yes No   Sig: every 24 hours   Misc Natural Products (Prostate Support) 300-15 MG TABS   Yes No   Sig: as directed   Multiple Vitamins-Minerals (MULTI FOR HIM 50+ PO)   Yes No   Sig: as directed   QUERCETIN PO   Yes No   Sig: Take by mouth   Turmeric Curcumin 500 MG CAPS   Yes No   Sig: Take by mouth   Zinc 30 MG CAPS   Yes No   Sig: every 24 hours   atorvastatin (LIPITOR) 80 mg tablet   Yes No   Sig: Take 80 mg by mouth daily   diazepam (VALIUM) 10 mg tablet   No No   Sig: TAKE ONE TABLET BY MOUTH EVERY DAY   levothyroxine 150 mcg tablet   Yes No   Sig: Take 150 mcg by mouth daily   propranolol (INDERAL) 80 mg tablet   Yes No   Sig: Take 80 mg by mouth daily   sildenafil (REVATIO) 20 mg tablet   No No   Sig: Take 1 tablet (20 mg total) by mouth as needed (as needed) Take 1-3 tablets as needed prior to sexual activity      Facility-Administered Medications: None       Past Medical History:   Diagnosis Date   • [facility] )     melonoma shoulder   • Disease of thyroid gland    • Enlarged prostate    • Hemorrhoids    • Hyperlipidemia    • [facility]an[facility] ([facility] )    • Obesity    • Tremor        Past Surgical History:   Procedure Laterality Date   • COLONOSCOPY     • Fairy Radish / Kennymon Temi / REVISION NEUROSTIMULATOR     • MT INSJ/RPLCMT CRANIAL NEUROSTIM GENER 2/> ELTRDS Left 8/17/2018    Procedure: REPLACEMENT left chest DEEP BRAIN STIMULATION GENERATOR;  Surgeon: Carol Nelson MD;  Location:  MAIN OR;  Service: Neurosurgery   • MT INSJ/RPLCMT CRANIAL NEUROSTIM GENER 2/> ELTRDS Left 4/14/2022    Procedure: Replacement implantable pulse generator for deep brain stimulator, left chest;  Surgeon: Werner Mills MD;  [facility];  Service: Neurosurgery   • SKIN SURGERY         Family History   Problem Relation Age of Onset   • Stroke Mother    • Anuerysm Father      I have reviewed and agree with the history as documented  E-Cigarette/Vaping   • E-Cigarette Use Never User      E-Cigarette/Vaping Substances   • Nicotine No    • THC No    • CBD No    • Flavoring No    • Other No    • Unknown No      Social History     Tobacco Use   • Smoking status: Never   • Smokeless tobacco: Former   • Tobacco comments:     used chewing tobacco "many many years ago"   Vaping Use   • Vaping Use: Never used   Substance Use Topics   • Alcohol use: Yes     Comment: social   • Drug use: No       Review of Systems   Constitutional: Positive for chills and fever  HENT: Positive for congestion  Negative for rhinorrhea, sore throat, trouble swallowing and voice change  Eyes: Negative for photophobia and visual disturbance  Respiratory: Positive for cough  Negative for chest tightness, shortness of breath, wheezing and stridor  Cardiovascular: Negative for chest pain, palpitations and leg swelling  Gastrointestinal: Negative for abdominal pain, constipation, diarrhea, nausea and vomiting  Genitourinary: Negative for dysuria and hematuria  Musculoskeletal: Negative for back pain, neck pain and neck stiffness  Skin: Negative for color change, pallor and rash  Neurological: Negative for dizziness, weakness, light-headedness, numbness and headaches         Physical Exam  Physical Exam  Vitals and nursing note reviewed  Constitutional:       General: He is not in acute distress  Appearance: Normal appearance  He is not ill-appearing, toxic-appearing or diaphoretic  HENT:      Head: Normocephalic  Mouth/Throat:      Mouth: Mucous membranes are moist    Eyes:      Conjunctiva/sclera: Conjunctivae normal       Pupils: Pupils are equal, round, and reactive to light  Cardiovascular:      Rate and Rhythm: Normal rate and regular rhythm  Pulses: Normal pulses  Heart sounds: Normal heart sounds  No murmur heard  Pulmonary:      Effort: Pulmonary effort is normal  No respiratory distress  Breath sounds: Normal breath sounds  No stridor  No wheezing, rhonchi or rales  Chest:      Chest wall: No tenderness  Abdominal:      General: Bowel sounds are normal  There is no distension  Palpations: Abdomen is soft  Tenderness: There is no abdominal tenderness  There is no guarding or rebound  Musculoskeletal:      Cervical back: Normal range of motion and neck supple  No rigidity  Right lower leg: No edema  Left lower leg: No edema  Lymphadenopathy:      Cervical: No cervical adenopathy  Skin:     General: Skin is warm and dry  Neurological:      General: No focal deficit present  Mental Status: He is alert and oriented to person, place, and time  Mental status is at baseline     Psychiatric:         Mood and Affect: Mood normal          Behavior: Behavior normal          Vital Signs  ED Triage Vitals [01/02/23 0518]   Temperature Pulse Respirations Blood Pressure SpO2   (!) 100 8 °F (38 2 °C) 94 18 135/77 94 %      Temp Source Heart Rate Source Patient Position - Orthostatic VS BP Location FiO2 (%)   Oral Monitor Sitting Left arm --      Pain Score       No Pain           Vitals:    01/02/23 0518 01/02/23 0750   BP: 135/77 119/78   Pulse: 94 84   Patient Position - Orthostatic VS: Sitting Sitting         Visual Acuity      ED Medications  Medications - No data to display    Diagnostic Studies  Results Reviewed     Procedure Component Value Units Date/Time    FLU/RSV/COVID - if FLU/RSV clinically relevant [[id number]]  (Abnormal) Collected: 01/02/23 0523    Lab Status: Final result Specimen: Nares from Nose Updated: 01/02/23 0605     SARS-CoV-2 Positive     INFLUENZA A PCR Negative     INFLUENZA B PCR Negative     RSV PCR Negative    Narrative:      FOR PEDIATRIC PATIENTS - copy/paste COVID Guidelines URL to browser: [email or web address]    SARS-CoV-2 assay is a Nucleic Acid Amplification assay intended for the  qualitative detection of nucleic acid from SARS-CoV-2 in nasopharyngeal  swabs  Results are for the presumptive identification of SARS-CoV-2 RNA  Positive results are indicative of infection with SARS-CoV-2, the virus  causing COVID-19, but do not rule out bacterial infection or co-infection  with other viruses  Laboratories within the [place] and its  territories are required to report all positive results to the appropriate  public health authorities  Negative results do not preclude SARS-CoV-2  infection and should not be used as the sole basis for treatment or other  patient management decisions  Negative results must be combined with  clinical observations, patient history, and epidemiological information  This test has not been FDA cleared or approved  This test has been authorized by FDA under an Emergency Use Authorization  (EUA)  This test is only authorized for the duration of time the  declaration that circumstances exist justifying the authorization of the  emergency use of an in vitro diagnostic tests for detection of SARS-CoV-2  virus and/or diagnosis of COVID-19 infection under section 564(b)(1) of  the Act, 21 U  S C  814OLD-1(U)(6), unless the authorization is terminated  or revoked sooner  The test has been validated but independent review by FDA  and CLIA is pending      Test performed using [employer] GeneXpert: This RT-PCR assay targets N2,  a region unique to SARS-CoV-2  A conserved region in the E-gene was chosen  for pan-Sarbecovirus detection which includes SARS-CoV-2  According to CMS-2020-01-R, this platform meets the definition of high-throughput technology  No orders to display              Procedures  Procedures         ED Course                                             Medical Decision Making  Acute COVID-19 with symptom onset on 12/31/22  Patient is well-appearing, non-toxic, ambulatory pulse ox 95% with at rest pulse-ox range 94-96%, clear lungs  Patient can speak in full sentences  No CXR ordered at this time as patient had clear lungs BL and normal pulse-ox monitoring  Discussed with patient and wife recommendations to start 2000 U Vitamin D daily, budesonide inhaler, monitor pulse-ox at home and follow up with PCP tomorrow  Patient takes statin and sildenafil which has medium interaction with paxlovid, so molpenoivor was prescribed instead  Last GFR noted in chart to be > 60  Discussed medication regimen and strict return precautions with patient and his wife who both verbalized understanding  COVID-19: acute illness or injury  Flu-like symptoms: acute illness or injury      Disposition  Final diagnoses:   COVID-19   Flu-like symptoms     Time reflects when diagnosis was documented in both MDM as applicable and the Disposition within this note     Time User Action Codes Description Comment    1/2/2023  8:04 AM Humaira Dials Add [U07 1] COVID-19     1/2/2023  8:09 AM Humaira Dials Add [R68 89] Flu-like symptoms       ED Disposition     ED Disposition   Discharge    Condition   Stable    Date/Time   Mon Jan 2, 2023  8:04 AM    Comment   Tsosie Pump discharge to home/self care                 Follow-up Information     Follow up With Specialties Details Why Contact Info Additional 7 Keeley Costello DO Family Medicine Schedule an appointment as soon as possible for a visit in 1 day for re-evaluation Evelio Chuck [facility]mons[facility] [address] [facility] Emergency Department Emergency Medicine Go to  As needed, If symptoms worsen, for re-evaluation [address] [place],[address] [id number] [phone] [address] Emergency Department, [address] [facility], [place], Maryige Norris [address]          Discharge Medication List as of 1/2/2023  8:11 AM      START taking these medications    Details   budesonide (Pulmicort Flexhaler) 180 MCG/ACT inhaler Inhale 2 puffs 2 (two) times a day Rinse mouth after use , Starting Mon 1/2/2023, Normal      molnupiravir (Lagevrio) 200 mg capsule Take 4 capsules (800 mg total) by mouth every 12 (twelve) hours for 5 days, Starting Mon 1/2/2023, Until Sat 1/7/2023, Normal         CONTINUE these medications which have NOT CHANGED    Details   Ascorbic Acid (Vitamin C) 500 MG CAPS Take by mouth, Historical Med      atorvastatin (LIPITOR) 80 mg tablet Take 80 mg by mouth daily, Starting Wed 8/26/2020, Historical Med      Coenzyme Q10 60 MG CAPS every 24 hours, Historical Med      diazepam (VALIUM) 10 mg tablet TAKE ONE TABLET BY MOUTH EVERY DAY, Normal      levothyroxine 150 mcg tablet Take 150 mcg by mouth daily, Historical Med      Misc Natural Products (Prostate Support) 300-15 MG TABS as directed, Historical Med      Multiple Vitamins-Minerals (MULTI FOR HIM 50+ PO) as directed, Historical Med      propranolol (INDERAL) 80 mg tablet Take 80 mg by mouth daily, Historical Med      QUERCETIN PO Take by mouth, Historical Med      sildenafil (REVATIO) 20 mg tablet Take 1 tablet (20 mg total) by mouth as needed (as needed) Take 1-3 tablets as needed prior to sexual activity, Starting Wed 11/30/2022, Normal      Turmeric Curcumin 500 MG CAPS Take by mouth, Historical Med      Zinc 30 MG CAPS every 24 hours, Historical Med No discharge procedures on file      PDMP Review       Value Time User    PDMP Reviewed  Yes 9/6/2022  8:11 AM Sanya La MD          ED Provider  Electronically Signed by           Raphael Farias DO  01/02/23 9675

## 2023-03-02 DIAGNOSIS — G25.0 BENIGN FAMILIAL TREMOR: ICD-10-CM

## 2023-03-02 RX ORDER — DIAZEPAM 10 MG/1
10 TABLET ORAL DAILY
Qty: 90 TABLET | Refills: 0 | Status: SHIPPED | OUTPATIENT
Start: 2023-03-02

## 2023-03-02 NOTE — TELEPHONE ENCOUNTER
Tho Dunn has called and is requesting a refill on his Diazepam 10 mg for a 90 day supply with refills and be sent to    [address]   [address] [facility], [address] [place] [address] Drive [address]   Phone:  [phone]  Fax:  [phone]       Thank you,     [employer] [place]

## 2023-03-02 NOTE — TELEPHONE ENCOUNTER
Chart reviewed  Chart notes state:  He has been on Valium 10mg daily and Inderal 80mg daily all his life  Next OV 4/27/2023 with Dr Reggie Moya - Rx entered  Please review and sign if in agreement

## 2023-04-27 ENCOUNTER — PROCEDURE VISIT (OUTPATIENT)
Dept: NEUROLOGY | Facility: CLINIC | Age: 68
End: 2023-04-27

## 2023-04-27 VITALS
WEIGHT: 252 LBS | BODY MASS INDEX: 33.25 KG/M2 | HEART RATE: 74 BPM | DIASTOLIC BLOOD PRESSURE: 80 MMHG | SYSTOLIC BLOOD PRESSURE: 130 MMHG

## 2023-04-27 DIAGNOSIS — G25.0 BENIGN ESSENTIAL TREMOR: ICD-10-CM

## 2023-04-27 DIAGNOSIS — Z96.89 S/P DEEP BRAIN STIMULATOR PLACEMENT: ICD-10-CM

## 2023-04-27 DIAGNOSIS — G25.0 BENIGN FAMILIAL TREMOR: Primary | ICD-10-CM

## 2023-04-27 NOTE — PROGRESS NOTES
Review of Systems   Constitutional: Negative  Negative for appetite change and fever  HENT: Negative  Negative for hearing loss, tinnitus, trouble swallowing and voice change  Eyes: Negative  Negative for photophobia, pain and visual disturbance  Respiratory: Negative  Negative for shortness of breath  Cardiovascular: Negative  Negative for palpitations  Gastrointestinal: Negative  Negative for nausea and vomiting  Endocrine: Negative  Negative for cold intolerance  Genitourinary: Negative  Negative for dysuria, frequency and urgency  Musculoskeletal: Negative for gait problem, myalgias and neck pain  Skin: Negative  Negative for rash  Allergic/Immunologic: Negative  Neurological: Positive for tremors (Hands, getting worse)  Negative for dizziness, seizures, syncope, facial asymmetry, speech difficulty, weakness, light-headedness, numbness and headaches  Hematological: Negative  Does not bruise/bleed easily  Psychiatric/Behavioral: Negative  Negative for confusion, hallucinations and sleep disturbance  All other systems reviewed and are negative

## 2023-04-27 NOTE — ASSESSMENT & PLAN NOTE
Mild progression of tremor left greater than right  Deep brain stimulator was interrogated  Tremor improved on finger-to-nose and with hand writing after a slight increase in amplitude  See body of the note for clinical details  He continues to be annoyed by the new patient programming device given there is 2 parts to it and he has difficulty holding it in place even temporarily with his left hand due to tremor  He has learned to manage with the system  Progression of left hand tremor  He has tried and failed multiple medications  He continues on propanolol and diazepam with partial improvement in tremor  He would benefit from placement of a right VIM lead DBS to control his left hand  He continues to be interested in placement of a right VIM lead for left hand tremor  He is interested in having this done at [facility] neurosurgery  Referral was placed as he has been having difficulty getting them to schedule his appointment

## 2023-04-27 NOTE — PROGRESS NOTES
Patient ID: Cesar Sanabria is a 79 y o  male    Assessment/Plan:    Benign essential tremor  Mild progression of tremor left greater than right  Deep brain stimulator was interrogated  Tremor improved on finger-to-nose and with hand writing after a slight increase in amplitude  See body of the note for clinical details  He continues to be annoyed by the new patient programming device given there is 2 parts to it and he has difficulty holding it in place even temporarily with his left hand due to tremor  He has learned to manage with the system  Progression of left hand tremor  He has tried and failed multiple medications  He continues on propanolol and diazepam with partial improvement in tremor  He would benefit from placement of a right VIM lead DBS to control his left hand  He continues to be interested in placement of a right VIM lead for left hand tremor  He is interested in having this done at [facility] neurosurgery  Referral was placed as he has been having difficulty getting them to schedule his appointment  Diagnoses and all orders for this visit:    Benign familial tremor  -     Ambulatory referral to Neurosurgery; Future    S/P deep brain stimulator placement  -     Ambulatory referral to Neurosurgery; Future    Benign essential tremor        Subjective:       Mr Cesar Sanabria is a right-handed retired  with essential tremor s/p LEFT VIM DBS, left chest placed 3/28/2000 in [place] by Dr Carlos Peters, with multiple prior IPG replacements lasting 2-3 years, switched from [facility] to [facility] on 12/20/11, who returns for follow up after IPG replacement 4/14/22  To review, action tremor began gradually, first noted around the 9th grade and progressed overtime  He has been on Valium 10mg daily and Inderal 80mg daily all his life  Previously on gabapentin but this was associated with sedation  Never tried topiramate or primidone   Overtime he has developed a left hand tremor and has contemplated but not proceeded with R lead placement  He is not interested in further medication trials for  tremor  He remains on propranolol 80mg daily, and diazepam 10mg daily  Surgery went well  He is annoyed by the new patient   He  has noted an increase in right hand tremor since battery placement  Interferes with eating, drinking, and writing  He has noted a mild intermittent head tremor  No vocal tremor  Changes in gait or cognition  Left hand tremor is also worse  He called [facility] and a referral was placed but they never got back to him  For this reason it was never placed  He is interested in undergoing right VIM placement but may need to do this in the late Summer  Objective:    /80 (BP Location: Left arm, Patient Position: Sitting, Cuff Size: Large)   Pulse 74   Wt 114 kg (252 lb)   BMI 33 25 kg/m²       Physical Exam  Vitals reviewed  Eyes:      Extraocular Movements: Extraocular movements intact  Neurological:      Motor: Motor strength is normal    Psychiatric:         Speech: Speech normal          Neurological Exam  Mental Status   Oriented to person, place, time and situation  Recent and remote memory are intact  Speech is normal  Follows complex commands  Attention and concentration are normal     Cranial Nerves  CN III, IV, VI: Extraocular movements intact bilaterally  CN VII: Full and symmetric facial movement  CN VIII: Hearing is normal   CN IX, X: Palate elevates symmetrically  CN XI: Shoulder shrug strength is normal   CN XII: Tongue midline without atrophy or fasciculations  Motor   Normal muscle tone  Strength is 5/5 throughout all four extremities  Coordination    Mild intentional tremor on finger-to-nose on the right  Moderate amplitude action tremor on left  Mildly tremulous handwriting  No head tremor  No focal tremor       Gait  Casual gait is normal including stance, stride, and arm swing   Able to rise from chair without using arms         Left VIM  Percept PC [id number]  [id number]  Implanted 4/14/2022  Battery  84%  EBL: 5 years , 6 months     New (Group B)  1: 1 6  0:2 8  , 180Hz  2 8mA (0-3 1)   Increased to 2 9 mA with improved tremor on FTn amd HW  Previous (Group A)  C+0-, PW90, 185Hz, 3 5mA          Current Outpatient Medications on File Prior to Visit   Medication Sig Dispense Refill   • Ascorbic Acid (Vitamin C) 500 MG CAPS Take by mouth     • atorvastatin (LIPITOR) 80 mg tablet Take 80 mg by mouth daily     • Coenzyme Q10 60 MG CAPS every 24 hours     • diazepam (VALIUM) 10 mg tablet Take 1 tablet (10 mg total) by mouth daily 90 tablet 0   • levothyroxine 150 mcg tablet Take 150 mcg by mouth daily     • Multiple Vitamins-Minerals (MULTI FOR HIM 50+ PO) as directed     • propranolol (INDERAL) 80 mg tablet Take 80 mg by mouth daily     • budesonide (Pulmicort Flexhaler) 180 MCG/ACT inhaler Inhale 2 puffs 2 (two) times a day Rinse mouth after use  (Patient not taking: Reported on 4/27/2023) 1 each 0   • Misc Natural Products (Prostate Support) 300-15 MG TABS as directed (Patient not taking: Reported on 4/27/2023)     • QUERCETIN PO Take by mouth (Patient not taking: Reported on 4/27/2023)     • sildenafil (REVATIO) 20 mg tablet Take 1 tablet (20 mg total) by mouth as needed (as needed) Take 1-3 tablets as needed prior to sexual activity (Patient not taking: Reported on 4/27/2023) 10 tablet 0   • Turmeric Curcumin 500 MG CAPS Take by mouth (Patient not taking: Reported on 4/27/2023)     • Zinc 30 MG CAPS every 24 hours (Patient not taking: Reported on 4/27/2023)       No current facility-administered medications on file prior to visit           Erick Waters MD  Movement disorder physician  [employer]

## 2023-04-28 ENCOUNTER — TELEPHONE (OUTPATIENT)
Dept: NEUROLOGY | Facility: CLINIC | Age: 68
End: 2023-04-28

## 2023-04-28 NOTE — TELEPHONE ENCOUNTER
Paulo avery:  Hi, this is [facility] neurology calling  We had received a referral from Dr Leia cervantes the patient's name is Thad Bowman Date of birth is April, 13 1955  I was calling because we did not receive any medical records to accompany the referral, and I was wondering if we could have medical records to support the reason the patient needs to be seen  Our fax number is [phone]  And if you have any questions, please give us a call back at [phone], option one for neurology  And then option 2 for scheduling  Thank you and have a great day  Clerical team,  Please fax office note from 4/28 and last office visit 6/9  Thanks for your help

## 2023-05-09 LAB — HBA1C MFR BLD HPLC: 5.3 %

## 2023-05-30 NOTE — PROGRESS NOTES
5/31/2023    Lance Hopper  1955  [id number]      Assessment  -Elevated PSA s/p negative prostate biopsy (11/23/2021)  -BPH with lower urinary tract symptoms  -Erectile dysfunction     Discussion/Plan  April Tai is a 79 y o  male being managed by Dr Lorena Guy  1  BPH with lower urinary tract symptoms- patient has no urinary complaints at this time  Continue to monitor urinary pattern  2  Prostate cancer screening s/p negative prostate biopsy (11/2021)- we discussed results of his recent PSA which is now 6 53, previously 5 0 (4/2022)  Recommend proceeding with multiparametric MRI of prostate to further evaluate rising PSA  Patient is amenable with plan  Based on results of MRI, we discussed he may require MRI fusion prostate biopsy  Patient verbalizes understanding  3  Erectile dysfunction- recommend increasing sildenafil dosage to 50 mg as needed prior to sexual activity  Patient may take up to 2 tablets  Prescription refill sent to his pharmacy  Follow-up to discuss results of multiparametric MRI of prostate  He was advised to call sooner with any questions or issues     -All questions answered, patient agrees with plan      History of Present Illness  79 y o  male with a history of BPH, ED and prostate cancer screening presents today for follow up  Patient last seen in the office in November 2022  He was noted to have elevated PSA of 5 0  Patient has prior history of negative prostate biopsy performed in November 2021  He denies any strong family history of prostate malignancy  Patient denies any lower urinary tract symptoms, gross hematuria, or dysuria  He previously tried tamsulosin, but discontinued due to side effects  He was also prescribed sildenafil at last office visit  Patient states he has tried up to 40 mg without any effects      Component      Latest Ref Rng & Units 10/8/2021 4/5/2022   PSA, Total      0 0 - 4 0 ng/mL 5 0 (H) 5 0 (H)       Review of Systems  Review of Systems   Constitutional: Negative  HENT: Negative  Respiratory: Negative  Cardiovascular: Negative  Gastrointestinal: Negative  Genitourinary: Negative for decreased urine volume, difficulty urinating, dysuria, flank pain, frequency, hematuria and urgency  Musculoskeletal: Negative  Skin: Negative  Neurological: Negative  Psychiatric/Behavioral: Negative  AUA SYMPTOM SCORE    Flowsheet Row Most Recent Value   AUA SYMPTOM SCORE    How often have you had a sensation of not emptying your bladder completely after you finished urinating? 1   How often have you had to urinate again less than two hours after you finished urinating? 0   How often have you found you stopped and started again several times when you urinate? 0   How often have you found it difficult to postpone urination? 1   How often have you had a weak urinary stream? 2   How often have you had to push or strain to begin urination? 0   How many times did you most typically get up to urinate from the time you went to bed at night until the time you got up in the morning?  1   Quality of Life: If you were to spend the rest of your life with your urinary condition just the way it is now, how would you feel about that? 2   AUA SYMPTOM SCORE 5          Past Medical History  Past Medical History:   Diagnosis Date   • Cancer ([facility] [address] )     melonoma shoulder   • Disease of thyroid gland    • Enlarged prostate    • Hemorrhoids    • Hyperlipidemia    • Melanoma ([facility] [address] )    • Obesity    • Tremor        Past Social History  Past Surgical History:   Procedure Laterality Date   • COLONOSCOPY     • INSERTION / Pro Pandy / REVISION NEUROSTIMULATOR     • HI INSJ/RPLCMT CRANIAL NEUROSTIM GENER 2/> ELTRDS Left 8/17/2018    Procedure: REPLACEMENT left chest DEEP BRAIN STIMULATION GENERATOR;  Surgeon: Frances Hutchinson MD;  Location:  MAIN OR;  Service: Neurosurgery   • HI INSJ/RPLCMT CRANIAL NEUROSTIM GENER 2/> ELTRDS Left 4/14/2022    Procedure: "Replacement implantable pulse generator for deep brain stimulator, left chest;  Surgeon: Aidan Tee MD;  Location: [facility];  Service: Neurosurgery   • SKIN SURGERY         Past Family History  Family History   Problem Relation Age of Onset   • Stroke Mother    • Anuerysm Father        Past Social history  Social History     Socioeconomic History   • Marital status: /Civil Union     Spouse name: Not on file   • Number of children: Not on file   • Years of education: Not on file   • Highest education level: Not on file   Occupational History   • Not on file   Tobacco Use   • Smoking status: Never   • Smokeless tobacco: Former   • Tobacco comments:     used chewing tobacco \"many many years ago\"   Vaping Use   • Vaping Use: Never used   Substance and Sexual Activity   • Alcohol use: Yes     Comment: social   • Drug use: No   • Sexual activity: Yes   Other Topics Concern   • Not on file   Social History Narrative   • Not on file     Social Determinants of Health     Financial Resource Strain: Not on file   Food Insecurity: Not on file   Transportation Needs: Not on file   Physical Activity: Not on file   Stress: Not on file   Social Connections: Not on file   Intimate Partner Violence: Not on file   Housing Stability: Not on file       Current Medications  Current Outpatient Medications   Medication Sig Dispense Refill   • Ascorbic Acid (Vitamin C) 500 MG CAPS Take by mouth     • atorvastatin (LIPITOR) 80 mg tablet Take 80 mg by mouth daily     • budesonide (Pulmicort Flexhaler) 180 MCG/ACT inhaler Inhale 2 puffs 2 (two) times a day Rinse mouth after use   (Patient not taking: Reported on 4/27/2023) 1 each 0   • Coenzyme Q10 60 MG CAPS every 24 hours     • diazepam (VALIUM) 10 mg tablet Take 1 tablet (10 mg total) by mouth daily 90 tablet 0   • levothyroxine 150 mcg tablet Take 150 mcg by mouth daily     • Misc Natural Products (Prostate Support) 300-15 MG TABS as directed (Patient not taking: Reported " on 4/27/2023)     • Multiple Vitamins-Minerals (MULTI FOR HIM 50+ PO) as directed     • propranolol (INDERAL) 80 mg tablet Take 80 mg by mouth daily     • QUERCETIN PO Take by mouth (Patient not taking: Reported on 4/27/2023)     • sildenafil (REVATIO) 20 mg tablet Take 1 tablet (20 mg total) by mouth as needed (as needed) Take 1-3 tablets as needed prior to sexual activity (Patient not taking: Reported on 4/27/2023) 10 tablet 0   • Turmeric Curcumin 500 MG CAPS Take by mouth (Patient not taking: Reported on 4/27/2023)     • Zinc 30 MG CAPS every 24 hours (Patient not taking: Reported on 4/27/2023)       No current facility-administered medications for this visit  Allergies  Allergies   Allergen Reactions   • Meperidine Itching     Other reaction(s): itch         Past Medical History, Social History, Family History, medications and allergies were reviewed  Vitals  There were no vitals filed for this visit  Physical Exam  Physical Exam  Constitutional:       Appearance: Normal appearance  He is well-developed  HENT:      Head: Normocephalic  Eyes:      Pupils: Pupils are equal, round, and reactive to light  Pulmonary:      Effort: Pulmonary effort is normal    Abdominal:      Palpations: Abdomen is soft  Musculoskeletal:         General: Normal range of motion  Cervical back: Normal range of motion  Skin:     General: Skin is warm and dry  Neurological:      General: No focal deficit present  Mental Status: He is alert and oriented to person, place, and time  Psychiatric:         Mood and Affect: Mood normal          Behavior: Behavior normal          Thought Content:  Thought content normal          Judgment: Judgment normal          Results    I have personally reviewed all pertinent lab results and reviewed with patient  Lab Results   Component Value Date    PSA 5 0 (H) 04/05/2022    PSA 5 0 (H) 10/08/2021     Lab Results   Component Value Date    BUN 18 04/05/2022    CALCIUM 9 4 04/05/2022     04/05/2022    CO2 29 04/05/2022    CREATININE 0 97 04/05/2022    GLUCOSE 95 04/22/2014    K 5 0 04/05/2022     04/22/2014     Lab Results   Component Value Date    HCT 46 8 04/05/2022    HGB 15 2 04/05/2022    MCV 99 (H) 04/05/2022     04/05/2022    WBC 8 89 04/05/2022     No results found for this or any previous visit (from the past 1 hour(s))

## 2023-05-31 ENCOUNTER — OFFICE VISIT (OUTPATIENT)
Dept: UROLOGY | Facility: HOSPITAL | Age: 68
End: 2023-05-31

## 2023-05-31 VITALS
HEART RATE: 75 BPM | SYSTOLIC BLOOD PRESSURE: 132 MMHG | BODY MASS INDEX: 37.77 KG/M2 | DIASTOLIC BLOOD PRESSURE: 78 MMHG | HEIGHT: 73 IN | OXYGEN SATURATION: 98 % | WEIGHT: 285 LBS

## 2023-05-31 DIAGNOSIS — R97.20 ELEVATED PSA: Primary | ICD-10-CM

## 2023-05-31 DIAGNOSIS — N52.9 ERECTILE DYSFUNCTION, UNSPECIFIED ERECTILE DYSFUNCTION TYPE: ICD-10-CM

## 2023-05-31 DIAGNOSIS — N40.1 BENIGN PROSTATIC HYPERPLASIA WITH LOWER URINARY TRACT SYMPTOMS, SYMPTOM DETAILS UNSPECIFIED: ICD-10-CM

## 2023-05-31 RX ORDER — SILDENAFIL 50 MG/1
50 TABLET, FILM COATED ORAL AS NEEDED
Qty: 10 TABLET | Refills: 3 | Status: SHIPPED | OUTPATIENT
Start: 2023-05-31

## 2023-06-05 ENCOUNTER — TELEPHONE (OUTPATIENT)
Dept: MRI IMAGING | Facility: HOSPITAL | Age: 68
End: 2023-06-05

## 2023-06-05 ENCOUNTER — TELEPHONE (OUTPATIENT)
Dept: UROLOGY | Facility: AMBULATORY SURGERY CENTER | Age: 68
End: 2023-06-05

## 2023-06-05 DIAGNOSIS — G25.0 BENIGN FAMILIAL TREMOR: ICD-10-CM

## 2023-06-05 DIAGNOSIS — R97.20 ELEVATED PSA: Primary | ICD-10-CM

## 2023-06-05 NOTE — TELEPHONE ENCOUNTER
Called patient and notified him that he can't have MRI because of his neurostimiulater  He is refusing and repeat TRUS or repeat PSA in 3 months    Will mail him script

## 2023-06-05 NOTE — TELEPHONE ENCOUNTER
Hello, my name is Tyron Holstein  Date of birth is [birth year]  Phone number [phone]  A patient of Dr Jerman Dennison  I was up to see her a few weeks back  And when I was checking out, I told the lady at the desk when I was checking out that I need a script for diazepam 90 days, 10 milligrams  I went to the pharmacy there to  my meds and they were not avail  I'd appreciate a call my office or my pharmacy and have them make it to get it together  Thank you very much bye  Rx entered   Pls review and sign off    thanks

## 2023-06-05 NOTE — TELEPHONE ENCOUNTER
Received TT from radiology  They are unable to perform multiparametric MRI of prostate secondary to neurostimulator  Options would be to proceed with repeat TRUS prostate biopsy in office, or repeat PSA in 3 months, reviewing causes for false elevation

## 2023-06-06 ENCOUNTER — TELEPHONE (OUTPATIENT)
Dept: NEUROLOGY | Facility: CLINIC | Age: 68
End: 2023-06-06

## 2023-06-06 RX ORDER — DIAZEPAM 10 MG/1
10 TABLET ORAL DAILY
Qty: 90 TABLET | Refills: 0 | Status: SHIPPED | OUTPATIENT
Start: 2023-06-06

## 2023-08-31 DIAGNOSIS — G25.0 BENIGN FAMILIAL TREMOR: ICD-10-CM

## 2023-08-31 NOTE — TELEPHONE ENCOUNTER
Last visit DBS interrogation, Dr. Angus Spurling 4/272/203  Last office visit Dr. Angus Spurling, 6/9/2022    Due for refill 9/6; please sign script if in agreement, thank you.

## 2023-09-01 RX ORDER — DIAZEPAM 10 MG/1
10 TABLET ORAL DAILY
Qty: 90 TABLET | Refills: 0 | Status: SHIPPED | OUTPATIENT
Start: 2023-09-01

## 2023-09-27 ENCOUNTER — PROCEDURE VISIT (OUTPATIENT)
Dept: NEUROLOGY | Facility: CLINIC | Age: 68
End: 2023-09-27
Payer: MEDICARE

## 2023-09-27 VITALS — WEIGHT: 280.2 LBS | DIASTOLIC BLOOD PRESSURE: 76 MMHG | BODY MASS INDEX: 36.97 KG/M2 | SYSTOLIC BLOOD PRESSURE: 130 MMHG

## 2023-09-27 DIAGNOSIS — G25.0 BENIGN ESSENTIAL TREMOR: Primary | ICD-10-CM

## 2023-09-27 PROCEDURE — 95983 ALYS BRN NPGT PRGRMG 15 MIN: CPT | Performed by: PSYCHIATRY & NEUROLOGY

## 2023-09-27 PROCEDURE — 95984 ALYS BRN NPGT PRGRMG ADDL 15: CPT | Performed by: PSYCHIATRY & NEUROLOGY

## 2023-09-27 NOTE — PATIENT INSTRUCTIONS
Functional neurosurgery - Dr. Shruti Johnson    Let me know if you would like me to place referral so you can meet with him

## 2023-09-27 NOTE — PROGRESS NOTES
Patient ID: Sherie Braden is a 79 y.o. male    Assessment/Plan:    Benign essential tremor  Moderate amplitude left hand action and postural tremor which is becoming more bothersome. Right hand tremor is fairly well controlled with slight breakthrough noted mostly when eating. D-BRAIN stimulator was interrogated. Slight increase in amplitude made with improvement of right hand tremor. See body of the clinical note for details. He is interested in right VIM placement. [facility]  He has reached out to their department but has had difficulty scheduling. We once again discussed the option of having this done locally. We have a new functional neurosurgeon who has joined [facility]. Questions answered. He was given Dr. Delmi Canada name and he will contact me should he wish for me to place referral.    Progression of left hand tremor. He has tried and failed multiple medications. He continues on propanolol and diazepam with partial improvement in tremor. He would benefit from placement of a right VIM lead DBS to control his left hand. He continues to be interested in placement of a right VIM lead for left hand tremor. He is interested in having this done at [facility] neurosurgery. Referral was placed as he has been having difficulty getting them to schedule his appointment. Diagnoses and all orders for this visit:    Benign essential tremor    Spent 30 minutes on patient visit, DBS programming, evaluation, and documentation. Subjective:      Mr. Sherie Braden is a right-handed retired  with essential tremor s/p LEFT VIM DBS, left chest placed 3/28/2000 in [place] by Dr. Araceli Gloria, with multiple prior IPG replacements lasting 2-3 years, who presents for follow up. To review, action tremor first noted around the 9th grade. He has been on Valium 10mg daily (since 9th grade) and Inderal 80mg daily all his life.  Previously on gabapentin but this was associated with sedation. Never tried topiramate or primidone. Overtime he has developed a left hand tremor and has contemplated but has yet to proceeded with R lead placement. He is not interested in further medication trials for  tremor. He remains on propranolol 80mg daily, and diazepam 10mg daily. Left hand tremor is worse. Right hand tremor is fairly well controlled. Most activities are performed using his right hand. He will notices a tremor at times when eating and cutting. No issues drinking. Handwriting is legible. He has always printed. No vocal tremor. Mild head tremor noted at times which is not bothersome. He denies any changes in gait or balance. No cognitive changes. Turns off device nightly to save battery.           Objective:    /76 (BP Location: Left arm, Patient Position: Sitting, Cuff Size: Large)   Wt 127 kg (280 lb 3.2 oz)   BMI 36.97 kg/m²       Physical Exam    Neurological Exam    Coordination    Head: no-no , moderate, 2  FTN: moderate on left 3, mild on right 1  Vocal: none    Legs: none  Rest: none  .            Left VIM  Percept PC [id number]  [id number]  Implanted 4/14/2022  Battery  77%  EBL: 4 years , 4 months   Impedance ok    New (Group B)  1: 1.7  0:2.9  , 180Hz  2.9mA (0-3.1)      (Group B)  1: 1.8  0:3.1  , 180Hz  Increased to 3.1mA (0-3.4)   New     Previous (Group A)  C+0-, PW90, 185Hz, 3.5mA               Charli Ulloa MD  Movement disorder physician  [facility]

## 2023-09-27 NOTE — ASSESSMENT & PLAN NOTE
Moderate amplitude left hand action and postural tremor which is becoming more bothersome. Right hand tremor is fairly well controlled with slight breakthrough noted mostly when eating. D-BRAIN stimulator was interrogated. Slight increase in amplitude made with improvement of right hand tremor. See body of the clinical note for details. He is interested in right VIM placement. [facility]  He has reached out to their department but has had difficulty scheduling. We once again discussed the option of having this done locally. We have a new functional neurosurgeon who has joined [facility]. Questions answered. He was given Dr. Venancio Bravo name and he will contact me should he wish for me to place referral.    Progression of left hand tremor. He has tried and failed multiple medications. He continues on propanolol and diazepam with partial improvement in tremor. He would benefit from placement of a right VIM lead DBS to control his left hand. He continues to be interested in placement of a right VIM lead for left hand tremor. He is interested in having this done at [facility] neurosurgery. Referral was placed as he has been having difficulty getting them to schedule his appointment.

## 2023-09-27 NOTE — PROGRESS NOTES
Review of Systems   Constitutional: Negative for appetite change, fatigue and fever. HENT: Negative. Negative for hearing loss, tinnitus, trouble swallowing and voice change. Eyes: Negative. Negative for photophobia, pain and visual disturbance. Respiratory: Negative. Negative for shortness of breath. Cardiovascular: Negative. Negative for palpitations. Gastrointestinal: Negative. Negative for nausea and vomiting. Endocrine: Negative. Negative for cold intolerance. Genitourinary: Negative. Negative for dysuria, frequency and urgency. Musculoskeletal: Negative for back pain, gait problem, myalgias and neck pain. Skin: Negative. Negative for rash. Allergic/Immunologic: Negative. Neurological: Positive for tremors (Hands, has gotten a little worse) and weakness (Legs). Negative for dizziness, seizures, syncope, facial asymmetry, speech difficulty, light-headedness, numbness and headaches. Hematological: Negative. Does not bruise/bleed easily. Psychiatric/Behavioral: Negative. Negative for confusion, hallucinations and sleep disturbance. All other systems reviewed and are negative.

## 2023-11-28 ENCOUNTER — TELEPHONE (OUTPATIENT)
Dept: UROLOGY | Facility: CLINIC | Age: 68
End: 2023-11-28

## 2023-11-28 DIAGNOSIS — R97.20 ELEVATED PSA: Primary | ICD-10-CM

## 2023-11-28 NOTE — TELEPHONE ENCOUNTER
navjot pt known to Dr Gary Knight elevated PSA negative biopsy 2021  received updated PSA 6.5 ok for his history  could not get MRI due to stimulator.   known BPH of 70g+ from prior TRUS    Plan: observe, i recommend a f/u in May 2024 with next psa and see Dr Gary Knight thanks

## 2023-11-28 NOTE — TELEPHONE ENCOUNTER
----- Message from [facility] sent at 11/24/2023 11:11 AM EST -----    ----- Message -----  From: Irvin Transcription Incoming  Sent: 11/23/2023   3:02 PM EST  To: [facility]

## 2023-11-30 ENCOUNTER — EVALUATION (OUTPATIENT)
Dept: PHYSICAL THERAPY | Facility: CLINIC | Age: 68
End: 2023-11-30
Payer: MEDICARE

## 2023-11-30 DIAGNOSIS — M25.511 ACUTE PAIN OF RIGHT SHOULDER: ICD-10-CM

## 2023-11-30 DIAGNOSIS — M54.12 CERVICAL RADICULOPATHY: Primary | ICD-10-CM

## 2023-11-30 PROCEDURE — 97010 HOT OR COLD PACKS THERAPY: CPT | Performed by: PHYSICAL THERAPIST

## 2023-11-30 PROCEDURE — 97110 THERAPEUTIC EXERCISES: CPT | Performed by: PHYSICAL THERAPIST

## 2023-11-30 PROCEDURE — 97162 PT EVAL MOD COMPLEX 30 MIN: CPT | Performed by: PHYSICAL THERAPIST

## 2023-11-30 NOTE — PROGRESS NOTES
PT Evaluation     Today's date: 2023  Patient name: Mehdi Azul  : 1955  MRN: [id number]  Referring provider: Kelsie Anguiano*  Dx:   Encounter Diagnosis     ICD-10-CM    1. Cervical radiculopathy  M54.12       2. Acute pain of right shoulder  M25.511                      Assessment  Assessment details: Patient is a 79 y.o. male with PT prescription for cervical radiculopathy and right shoulder pain. Patient presents with decreased cervical spine AROM, positive upper limb tension testing, reduction in symptoms with right sided cervical distraction, and poor postural awareness and body mechanics. These impairments limit patient with sleeping, turning head while driving, performing ADLs or social participation without constant pain. To address impairments and improve function, patient would benefit from skilled PT consisting of manual therapy to improve cervical spine ROM and pain, therapeutic exercises to improve flexibility and postural muscle strength, neuromuscular reeducation to improve cervical stabilization, neurodynamics, and patient education on proper posture and home program.  Patient also has PT script for gait training since DVT with accompanying limp, which will be evaluated at another session. Impairments: abnormal muscle firing, abnormal or restricted ROM, impaired physical strength, lacks appropriate home exercise program, pain with function, poor posture  and poor body mechanics    Symptom irritability: moderateUnderstanding of Dx/Px/POC: good   Prognosis: good    Goals  Short term goals:  Patient is independent in home program to support plan of care and improve function. - 2 weeks  Patient demonstrates at least 70 deg. with cervical R rotation AROM so he can drive with less pain. - 3 weeks  Patient has balance and gait evaluated and incorporated into plan of care to reduce fall risk.  - 2 weeks    Long term goals:  Patient demonstrates improvement in community participation with increase in FOTO score by 15%. - 8 weeks  Patient can get out of bed and sleep without neck pain for improved quality of life. - 4 weeks  Patient demonstrates negative special tests for upper limb tension to correlate with low irritability and pain with ADLs. - 8 weeks  Patient demonstrates 90% cervical AROM without pain so he can perform all ADLs/IADLs without difficulty. 8 weeks      Plan  Patient would benefit from: skilled physical therapy  Planned therapy interventions: activity modification, joint mobilization, manual therapy, massage, neuromuscular re-education, patient education, postural training, strengthening, stretching, body mechanics training, therapeutic exercise, flexibility, functional ROM exercises, home exercise program and therapeutic activities  Frequency: 2x week  Duration in weeks: 8  Plan of Care beginning date: 11/30/2023  Plan of Care expiration date: 1/25/2024  Treatment plan discussed with: patient    Subjective Evaluation    History of Present Illness  Date of onset: 10/16/2023  Mechanism of injury: Patient has PT prescription for cervical radiculopathy and right shoulder pain. Patient reports pain initially started in scapula but spread into shoulder, forearm and hand. Onset about 1 month ago, possibly due to walking change after getting DVT and blood clot. Patient saw PCP, had Xray, and given prednisone pack. He reported only slight relief following. Symptoms: R sided shoulder blade and whole UE into all 5 fingers, numbness/tingling in hand when lying. Lying on left side with hand stretched out is most comfortable. Increased pain looking to right side, reaching down with right arm, moving arm quickly. Pain is constant. Patient denies visual disturbance, facial numbness, head aches, falls,      PMH: Deep brain stimulator 2000 for essential tremor (wire fronts from left pectoral area along left side of cervical spine and posterior cranium).  DVT 1 month ago, currently on blood thinners. Patient Goals  Patient goals for therapy: decreased pain and increased motion    Pain  Current pain ratin  At best pain ratin  At worst pain ratin        Objective     Postural Observations  Seated posture: fair      Palpation     Additional Palpation Details  Wire present subcutaneously along left side of cervical spine    Neurological Testing     Sensation   Cervical/Thoracic   Left   Intact: light touch    Right   Intact: light touch    Additional Neurological Details  Deep tendon reflexes: diminished but symmetrical b/l     Active Range of Motion   Cervical/Thoracic Spine       Cervical    Flexion:  WFL  Extension: 38 degrees      Left lateral flexion: 25 degrees      Right lateral flexion: 12 degrees      Left rotation: 60 degrees with pain  Right rotation: 60 degrees    with pain  Mechanical Assessment    Cervical    Seated retraction: repeated movements   Pain location: no change    Thoracic      Lumbar      Strength/Myotome Testing     Left Shoulder     Planes of Motion   Flexion: 4+   Extension: 5   Abduction: 4+     Isolated Muscles   Upper trapezius: 5     Right Shoulder     Planes of Motion   Flexion: 4+   Extension: 5   Abduction: 4+     Isolated Muscles   Upper trapezius: 5     Left Elbow   Flexion: 5  Extension: 5    Right Elbow   Flexion: 5  Extension: 5    Left Wrist/Hand   Wrist extension: 5  Wrist flexion: 5  Thumb extension: 5    Right Wrist/Hand   Wrist extension: 5  Wrist flexion: 5  Thumb extension: 5    Tests   Cervical   Positive cervical distraction test.  Negative alar ligament test and transverse ligament test.     Left   Negative Spurling's Test A. Right   Negative Spurling's Test A. Right Shoulder   Positive ULTT1, ULTT3 and ULTT4.               Precautions: Essential tremor with DBS (wire inserted along left side of cervical spine) EPOC 24      Manuals             Suboccipital release             R Sided unilateral cervical traction ANDREI                                       Neuro Re-Ed                          Median nerve flossing 2x10            Ulnar nerve flossing             Radial nerve flossing                          Scap retraction                          Ther Ex             Cervical retraction             R upper trap stretch 3x15"            Cervical AROM 5x5" ea            Doorway pec major stretch                                                                 Ther Activity                                       Gait Training                                       Modalities             [facility] end 8'

## 2023-11-30 NOTE — LETTER
2023    [facility] [facility]    Patient: Chandni Matthews   YOB: 1955   Date of Visit: 2023     Encounter Diagnosis     ICD-10-CM    1. Cervical radiculopathy  M54.12       2. Acute pain of right shoulder  M25.511           Dear Dr. Pratik Carver: Thank you for your recent referral of Chandni Matthews. Please review the attached evaluation summary from Doug's recent visit. Please verify that you agree with the plan of care by signing the attached order. If you have any questions or concerns, please do not hesitate to call. I sincerely appreciate the opportunity to share in the care of one of your patients and hope to have another opportunity to work with you in the near future. Sincerely,    DEANN Jara, PT      Referring Provider:      I certify that I have read the below Plan of Care and certify the need for these services furnished under this plan of treatment while under my care. DO [facility]  [address] [place] [address]  Via Fax: [phone]          PT Evaluation     Today's date: 2023  Patient name: Chandni Matthews  : 1955  MRN: [id number]  Referring provider: Fiona Napoles  Dx:   Encounter Diagnosis     ICD-10-CM    1. Cervical radiculopathy  M54.12       2. Acute pain of right shoulder  M25.511                      Assessment  Assessment details: Patient is a 79 y.o. male with PT prescription for cervical radiculopathy and right shoulder pain. Patient presents with decreased cervical spine AROM, positive upper limb tension testing, reduction in symptoms with right sided cervical distraction, and poor postural awareness and body mechanics. These impairments limit patient with sleeping, turning head while driving, performing ADLs or social participation without constant pain.  To address impairments and improve function, patient would benefit from skilled PT consisting of manual therapy to improve cervical spine ROM and pain, therapeutic exercises to improve flexibility and postural muscle strength, neuromuscular reeducation to improve cervical stabilization, neurodynamics, and patient education on proper posture and home program.  Patient also has PT script for gait training since DVT with accompanying limp, which will be evaluated at another session. Impairments: abnormal muscle firing, abnormal or restricted ROM, impaired physical strength, lacks appropriate home exercise program, pain with function, poor posture  and poor body mechanics    Symptom irritability: moderateUnderstanding of Dx/Px/POC: good   Prognosis: good    Goals  Short term goals:  Patient is independent in home program to support plan of care and improve function. - 2 weeks  Patient demonstrates at least 70 deg. with cervical R rotation AROM so he can drive with less pain. - 3 weeks  Patient has balance and gait evaluated and incorporated into plan of care to reduce fall risk. - 2 weeks    Long term goals:  Patient demonstrates improvement in community participation with increase in FOTO score by 15%. - 8 weeks  Patient can get out of bed and sleep without neck pain for improved quality of life. - 4 weeks  Patient demonstrates negative special tests for upper limb tension to correlate with low irritability and pain with ADLs. - 8 weeks  Patient demonstrates 90% cervical AROM without pain so he can perform all ADLs/IADLs without difficulty.  8 weeks      Plan  Patient would benefit from: skilled physical therapy  Planned therapy interventions: activity modification, joint mobilization, manual therapy, massage, neuromuscular re-education, patient education, postural training, strengthening, stretching, body mechanics training, therapeutic exercise, flexibility, functional ROM exercises, home exercise program and therapeutic activities  Frequency: 2x week  Duration in weeks: 8  Plan of Care beginning date: 2023  Plan of Care expiration date: 2024  Treatment plan discussed with: patient    Subjective Evaluation    History of Present Illness  Date of onset: 10/16/2023  Mechanism of injury: Patient has PT prescription for cervical radiculopathy and right shoulder pain. Patient reports pain initially started in scapula but spread into shoulder, forearm and hand. Onset about 1 month ago, possibly due to walking change after getting DVT and blood clot. Patient saw PCP, had Xray, and given prednisone pack. He reported only slight relief following. Symptoms: R sided shoulder blade and whole UE into all 5 fingers, numbness/tingling in hand when lying. Lying on left side with hand stretched out is most comfortable. Increased pain looking to right side, reaching down with right arm, moving arm quickly. Pain is constant. Patient denies visual disturbance, facial numbness, head aches, falls,      PMH: Deep brain stimulator 2000 for essential tremor (wire fronts from left pectoral area along left side of cervical spine and posterior cranium). DVT 1 month ago, currently on blood thinners.       Patient Goals  Patient goals for therapy: decreased pain and increased motion    Pain  Current pain ratin  At best pain ratin  At worst pain ratin        Objective     Postural Observations  Seated posture: fair      Palpation     Additional Palpation Details  Wire present subcutaneously along left side of cervical spine    Neurological Testing     Sensation   Cervical/Thoracic   Left   Intact: light touch    Right   Intact: light touch    Additional Neurological Details  Deep tendon reflexes: diminished but symmetrical b/l     Active Range of Motion   Cervical/Thoracic Spine       Cervical    Flexion:  WFL  Extension: 38 degrees      Left lateral flexion: 25 degrees      Right lateral flexion: 12 degrees      Left rotation: 60 degrees with pain  Right rotation: 60 degrees    with pain  Mechanical Assessment    Cervical    Seated retraction: repeated movements   Pain location: no change    Thoracic      Lumbar      Strength/Myotome Testing     Left Shoulder     Planes of Motion   Flexion: 4+   Extension: 5   Abduction: 4+     Isolated Muscles   Upper trapezius: 5     Right Shoulder     Planes of Motion   Flexion: 4+   Extension: 5   Abduction: 4+     Isolated Muscles   Upper trapezius: 5     Left Elbow   Flexion: 5  Extension: 5    Right Elbow   Flexion: 5  Extension: 5    Left Wrist/Hand   Wrist extension: 5  Wrist flexion: 5  Thumb extension: 5    Right Wrist/Hand   Wrist extension: 5  Wrist flexion: 5  Thumb extension: 5    Tests   Cervical   Positive cervical distraction test.  Negative alar ligament test and transverse ligament test.     Left   Negative Spurling's Test A. Right   Negative Spurling's Test A. Right Shoulder   Positive ULTT1, ULTT3 and ULTT4.               Precautions: Essential tremor with DBS (wire inserted along left side of cervical spine) EPOC 1/25/24      Manuals 11/30            Suboccipital release             R Sided unilateral cervical traction ANDREI                                       Neuro Re-Ed                          Median nerve flossing 2x10            Ulnar nerve flossing             Radial nerve flossing                          Scap retraction                          Ther Ex             Cervical retraction             R upper trap stretch 3x15"            Cervical AROM 5x5" ea            Doorway pec major stretch                                                                 Ther Activity                                       Gait Training                                       Modalities             [facility] end 8'

## 2023-12-02 DIAGNOSIS — G25.0 BENIGN FAMILIAL TREMOR: ICD-10-CM

## 2023-12-04 ENCOUNTER — OFFICE VISIT (OUTPATIENT)
Dept: PHYSICAL THERAPY | Facility: CLINIC | Age: 68
End: 2023-12-04
Payer: MEDICARE

## 2023-12-04 DIAGNOSIS — M54.12 CERVICAL RADICULOPATHY: Primary | ICD-10-CM

## 2023-12-04 DIAGNOSIS — G25.0 BENIGN FAMILIAL TREMOR: ICD-10-CM

## 2023-12-04 DIAGNOSIS — M25.511 ACUTE PAIN OF RIGHT SHOULDER: ICD-10-CM

## 2023-12-04 PROCEDURE — 97110 THERAPEUTIC EXERCISES: CPT | Performed by: PHYSICAL THERAPIST

## 2023-12-04 PROCEDURE — 97010 HOT OR COLD PACKS THERAPY: CPT | Performed by: PHYSICAL THERAPIST

## 2023-12-04 PROCEDURE — 97140 MANUAL THERAPY 1/> REGIONS: CPT | Performed by: PHYSICAL THERAPIST

## 2023-12-04 PROCEDURE — 97112 NEUROMUSCULAR REEDUCATION: CPT | Performed by: PHYSICAL THERAPIST

## 2023-12-04 RX ORDER — DIAZEPAM 10 MG/1
10 TABLET ORAL DAILY
Qty: 90 TABLET | Refills: 0 | Status: CANCELLED | OUTPATIENT
Start: 2023-12-04

## 2023-12-04 RX ORDER — DIAZEPAM 10 MG/1
10 TABLET ORAL DAILY
Qty: 90 TABLET | Refills: 0 | Status: SHIPPED | OUTPATIENT
Start: 2023-12-04

## 2023-12-04 NOTE — PROGRESS NOTES
Daily Note     Today's date: 2023  Patient name: Suhail Man  : 1955  MRN: [id number]  Referring provider: Carissa Nicolas*  Dx:   Encounter Diagnosis     ICD-10-CM    1. Cervical radiculopathy  M54.12       2. Acute pain of right shoulder  M25.511                      Subjective: patient reports that he felt good following evaluation which lasted for several hours. He has been consistent with home program. He reports about 7/10 pain currently. Objective: See treatment diary below      Assessment: Tolerated treatment well. Began session with moist hot pack to reduce pain and irritability of cervical spine. Instructed patient in cervical retraction and scapular retraction while doing moist hot pack. Patient did not have as much pain relief during unilateral manual traction on L side compared to during evaluation. Overall he noted less pain by end of session. Patient would benefit from continued skilled PT per plan of care to address impairments and improve function. Plan: Continue per plan of care.       Precautions: Essential tremor with DBS (wire inserted along left side of cervical spine) EPOC 24      Manuals            Suboccipital release             R Sided unilateral cervical traction ANDREI  ANDREI           TPR R upper trap  ANDREI                        Neuro Re-Ed             Median nerve tensioner  x10           Median nerve flossing 2x10 x10           Ulnar nerve flossing  x10           Radial nerve flossing  x10           Rows with retraction  x10 gtb           Scap retraction  10x                        Ther Ex             Cervical retraction  10x           R upper trap stretch 3x15" 4x15"           Cervical AROM 5x5" ea 10x5"           Doorway pec major stretch  4x15"                                                                Ther Activity                                       Gait Training                                       Modalities             [facility] end 8' 8' start

## 2023-12-06 ENCOUNTER — OFFICE VISIT (OUTPATIENT)
Dept: PHYSICAL THERAPY | Facility: CLINIC | Age: 68
End: 2023-12-06
Payer: MEDICARE

## 2023-12-06 DIAGNOSIS — M54.12 CERVICAL RADICULOPATHY: Primary | ICD-10-CM

## 2023-12-06 DIAGNOSIS — M25.511 ACUTE PAIN OF RIGHT SHOULDER: ICD-10-CM

## 2023-12-06 PROCEDURE — 97140 MANUAL THERAPY 1/> REGIONS: CPT | Performed by: PHYSICAL THERAPIST

## 2023-12-06 PROCEDURE — 97112 NEUROMUSCULAR REEDUCATION: CPT | Performed by: PHYSICAL THERAPIST

## 2023-12-06 PROCEDURE — 97110 THERAPEUTIC EXERCISES: CPT | Performed by: PHYSICAL THERAPIST

## 2023-12-06 PROCEDURE — 97010 HOT OR COLD PACKS THERAPY: CPT | Performed by: PHYSICAL THERAPIST

## 2023-12-06 NOTE — PROGRESS NOTES
Daily Note     Today's date: 2023  Patient name: Isaías Valle  : 1955  MRN: [id number]  Referring provider: Lisa Conner*  Dx:   Encounter Diagnosis     ICD-10-CM    1. Cervical radiculopathy  M54.12       2. Acute pain of right shoulder  M25.511                      Subjective: patient reports that he had a lot of pain yesterday, and could barely lift his arm or do his exercises. The pain progressed gradually throughout the day. He is unsure why the flare up, but he reports feeling much better today, reporting only 5/10 pain. Objective: See treatment diary below      Assessment: Tolerated treatment well. Performed joint mobilizations to upper thoracic spine in sitting to reduce strain to affected nerve roots of cervical spine. This in combination with STM to R upper trap cause significant pain relief. Held median nerve tensioner to avoid aggravation of symptoms. Patient reported pain reduced to 3-4/10 by end of session. Patient would benefit from continued skilled PT per plan of care to address impairments and improve function. Plan: Continue per plan of care.       Precautions: Essential tremor with DBS (wire inserted along left side of cervical spine) EPOC 24      Manuals           Suboccipital release             R Sided unilateral cervical traction ANDREI  ANDREI ANDREI          TPR R upper trap  ANDREI ANDREI          Seated p-a mob upper Tspine   ANDREI          Neuro Re-Ed             Median nerve tensioner  x10           Median nerve flossing 2x10 x10 x10          Ulnar nerve flossing  x10 x10          Radial nerve flossing  x10 x10          Rows with retraction  x10 gtb           Scap retraction  10x 10x                       Ther Ex             Cervical retraction  10x 10x          R upper trap stretch 3x15" 4x15" 4x15"          Cervical AROM 5x5" ea 10x5" 10x5"          Doorway pec major stretch  4x15"  nv                                                              Ther Activity                                       Gait Training                                       Modalities             [facility] end 8' 8' start 8' start

## 2023-12-07 ENCOUNTER — APPOINTMENT (OUTPATIENT)
Dept: PHYSICAL THERAPY | Facility: CLINIC | Age: 68
End: 2023-12-07
Payer: MEDICARE

## 2023-12-12 ENCOUNTER — EVALUATION (OUTPATIENT)
Dept: PHYSICAL THERAPY | Facility: CLINIC | Age: 68
End: 2023-12-12
Payer: MEDICARE

## 2023-12-12 DIAGNOSIS — R26.9 NEUROLOGIC GAIT DYSFUNCTION: ICD-10-CM

## 2023-12-12 DIAGNOSIS — I82.461 ACUTE DEEP VEIN THROMBOSIS (DVT) OF CALF MUSCLE VEIN OF RIGHT LOWER EXTREMITY (HCC): ICD-10-CM

## 2023-12-12 DIAGNOSIS — M25.511 ACUTE PAIN OF RIGHT SHOULDER: ICD-10-CM

## 2023-12-12 DIAGNOSIS — M54.12 CERVICAL RADICULOPATHY: Primary | ICD-10-CM

## 2023-12-12 PROCEDURE — 97164 PT RE-EVAL EST PLAN CARE: CPT | Performed by: PHYSICAL THERAPIST

## 2023-12-12 PROCEDURE — 97140 MANUAL THERAPY 1/> REGIONS: CPT | Performed by: PHYSICAL THERAPIST

## 2023-12-12 NOTE — LETTER
2023    [address] [place] [address]    Patient: Sajan Lambert   YOB: 1955   Date of Visit: 2023     Encounter Diagnosis     ICD-10-CM    1. Cervical radiculopathy  M54.12       2. Acute pain of right shoulder  M25.511       3. Neurologic gait dysfunction  R26.9       4. Acute deep vein thrombosis (DVT) of calf muscle vein of right lower extremity [facility])  I82.461           Dear Dr. Wes Cruz: Thank you for your recent referral of Sajan Lambert. Please review the attached evaluation summary from Doug's recent visit. Please verify that you agree with the plan of care by signing the attached order. If you have any questions or concerns, please do not hesitate to call. I sincerely appreciate the opportunity to share in the care of one of your patients and hope to have another opportunity to work with you in the near future. Sincerely,    DEANN Hansen, PT      Referring Provider:      I certify that I have read the below Plan of Care and certify the need for these services furnished under this plan of treatment while under my care. DO [facility]  [address] [place] [address] [address] [place] [address]  Via Fax: [phone]          PT Re-Evaluation     Today's date: 2023  Patient name: Sajan Lambert  : 1955  MRN: [id number]  Referring provider: Rajesh Degroot*  Dx:   Encounter Diagnosis     ICD-10-CM    1. Cervical radiculopathy  M54.12       2. Acute pain of right shoulder  M25.511       3. Neurologic gait dysfunction  R26.9       4. Acute deep vein thrombosis (DVT) of calf muscle vein of right lower extremity (HCC)  I82.461                      Assessment  Assessment details: RE-EVALUATION 23: Patient assessed for LE weakness/imbalance today following DVT in right lower leg and subsequent altered gait pattern.  Patient presents with good ankle ROM, but significant calf muscle weakness, decreased balance, and proximal hip weakness on R side. These impairments pose slight fall risk for patient and impair prolonged walking, especially when patient feels his R foot start to slap. Patient would benefit from adding treatment of lower leg into current PT for cervical radiculopathy. Impairments: abnormal gait, abnormal muscle firing, abnormal muscle tone, abnormal or restricted ROM, impaired physical strength, lacks appropriate home exercise program, pain with function, poor posture  and poor body mechanics    Symptom irritability: lowUnderstanding of Dx/Px/POC: good   Prognosis: good    Goals  Short term goals:  Patient is independent in home program to support plan of care and improve function. - 2 weeks  Patient demonstrates at least 70 deg. with cervical R rotation AROM so he can drive with less pain. - 3 weeks  Patient has balance and gait evaluated and incorporated into plan of care to reduce fall risk. - goal met  Patient demonstrates at least 15 seconds tandem stance to reduce risk of falls in home. - 2 weeks    Long term goals:  Patient demonstrates improvement in community participation with increase in FOTO score by 15%. - 8 weeks  Patient can get out of bed and sleep without neck pain for improved quality of life. - 4 weeks  Patient demonstrates negative special tests for upper limb tension to correlate with low irritability and pain with ADLs. - 8 weeks  Patient demonstrates 90% cervical AROM without pain so he can perform all ADLs/IADLs without difficulty. 8 weeks  Patient demonstrates symmetrical calf muscle strength with plantar flexion test so he can walk long distances with less pain/limping.         Plan  Patient would benefit from: skilled physical therapy  Planned therapy interventions: activity modification, joint mobilization, manual therapy, massage, neuromuscular re-education, patient education, postural training, strengthening, stretching, body mechanics training, therapeutic exercise, flexibility, functional ROM exercises, home exercise program and therapeutic activities  Frequency: 2x week  Duration in weeks: 8  Plan of Care beginning date: 11/30/2023  Plan of Care expiration date: 1/25/2024  Treatment plan discussed with: patient    Subjective Evaluation    History of Present Illness  Date of onset: 10/16/2023  Mechanism of injury: RE-EVALUATION 12/12/23: Re-evaluation done today to assess impairments related to walking and imbalance from calf pain after DVT. Patient was walking several months ago and felt like he got hit with a baseball bat, and had trouble moving. He went to hospital, eventually had US revealing DVT. Patient went to see hematologist, found he is predisposed to blood clotting, through genetic test so he will always be on blood thinner. He was recommended to come to PT to work on balance for his right leg. He reports that he feels he slaps his foot sometimes with walking, several times per day. Patient has PT prescription for cervical radiculopathy and right shoulder pain. Patient reports pain initially started in scapula but spread into shoulder, forearm and hand. Onset about 1 month ago, possibly due to walking change after getting DVT and blood clot. Patient saw PCP, had Xray, and given prednisone pack. He reported only slight relief following. Symptoms: R sided shoulder blade and whole UE into all 5 fingers, numbness/tingling in hand when lying. Lying on left side with hand stretched out is most comfortable. Increased pain looking to right side, reaching down with right arm, moving arm quickly. Pain is constant. Patient denies visual disturbance, facial numbness, head aches, falls,      PMH: Deep brain stimulator 2000 for essential tremor (wire fronts from left pectoral area along left side of cervical spine and posterior cranium). DVT 1 month ago, currently on blood thinners.       Patient Goals  Patient goals for therapy: decreased pain and increased motion    Pain  Current pain ratin  At best pain ratin  At worst pain ratin        Objective     Postural Observations  Seated posture: fair      Palpation     Additional Palpation Details  Wire present subcutaneously along left side of cervical spine    Neurological Testing     Sensation   Cervical/Thoracic   Left   Intact: light touch    Right   Intact: light touch    Additional Neurological Details  Deep tendon reflexes: diminished but symmetrical b/l     Active Range of Motion   Cervical/Thoracic Spine       Cervical    Flexion:  WFL  Extension: 38 degrees      Left lateral flexion: 25 degrees      Right lateral flexion: 12 degrees      Left rotation: 60 degrees with pain  Right rotation: 60 degrees    with pain  Left Ankle/Foot   Dorsiflexion (ke): 20 degrees   Plantar flexion: 65 degrees   Inversion: 25 degrees   Eversion: 20 degrees     Right Ankle/Foot   Dorsiflexion (ke): 18 degrees   Plantar flexion: 65 degrees   Inversion: 45 degrees   Eversion: 15 degrees     Passive Range of Motion   Left Ankle/Foot    Dorsiflexion (ke): WFL  Plantar flexion: WFL  Inversion: WFL  Eversion: WFL    Right Ankle/Foot    Dorsiflexion (ke): WFL  Plantar flexion: WFL  Inversion: WFL  Eversion: WFL  Mechanical Assessment    Cervical    Seated retraction: repeated movements   Pain location: no change    Thoracic      Lumbar      Strength/Myotome Testing     Left Shoulder     Planes of Motion   Flexion: 4+   Extension: 5   Abduction: 4+     Isolated Muscles   Upper trapezius: 5     Right Shoulder     Planes of Motion   Flexion: 4+   Extension: 5   Abduction: 4+     Isolated Muscles   Upper trapezius: 5     Left Elbow   Flexion: 5  Extension: 5    Right Elbow   Flexion: 5  Extension: 5    Left Wrist/Hand   Wrist extension: 5  Wrist flexion: 5  Thumb extension: 5    Right Wrist/Hand   Wrist extension: 5  Wrist flexion: 5  Thumb extension: 5    Left Hip   Planes of Motion   Flexion: 5    Right Hip   Planes of Motion   Flexion: 4    Left Knee   Flexion: 5  Extension: 5    Right Knee   Flexion: 5  Extension: 5    Left Ankle/Foot   Dorsiflexion: 5  Plantar flexion: 3+  Inversion: 5  Eversion: 5  Great toe extension: 5    Right Ankle/Foot   Dorsiflexion: 5  Plantar flexion: 3-  Inversion: 5  Eversion: 5  Great toe extension: 5    Tests   Cervical   Positive cervical distraction test.  Negative alar ligament test and transverse ligament test.     Left   Negative Spurling's Test A. Right   Negative Spurling's Test A. Right Shoulder   Positive ULTT1, ULTT3 and ULTT4.      Additional Tests Details  Balance testin seconds tandem b/l  SLS unable  (+) latonya's DVT test on R             Precautions: Essential tremor with DBS (wire inserted along left side of cervical spine) EPOC 24      Manuals          Suboccipital release             R Sided unilateral cervical traction ANDREI  ANDREI ANDREI          TPR R upper trap  ANDREI ANDREI          Seated p-a mob upper Tspine   ANDREI          Neuro Re-Ed             Median nerve tensioner  x10           Median nerve flossing 2x10 x10 x10 x10         Ulnar nerve flossing  x10 x10 x10         Radial nerve flossing  x10 x10 x10         Rows with retraction  x10 gtb           Scap retraction  10x 10x                       Ther Ex             Cervical retraction  10x 10x          R upper trap stretch 3x15" 4x15" 4x15" 4x15"         Cervical AROM 5x5" ea 10x5" 10x5" 10x5"         Doorway pec major stretch  4x15"  nv 4x15"         Re-eval    30'                                                Ther Activity                                       Gait Training                                       Modalities             MHP end 8' 8' start 8' start 8' start

## 2023-12-12 NOTE — PROGRESS NOTES
PT Re-Evaluation     Today's date: 2023  Patient name: Rosetta Al  : 1955  MRN: [id number]  Referring provider: Samson Garza*  Dx:   Encounter Diagnosis     ICD-10-CM    1. Cervical radiculopathy  M54.12       2. Acute pain of right shoulder  M25.511       3. Neurologic gait dysfunction  R26.9       4. Acute deep vein thrombosis (DVT) of calf muscle vein of right lower extremity (HCC)  I82.461                      Assessment  Assessment details: RE-EVALUATION 23: Patient assessed for LE weakness/imbalance today following DVT in right lower leg and subsequent altered gait pattern. Patient presents with good ankle ROM, but significant calf muscle weakness, decreased balance, and proximal hip weakness on R side. These impairments pose slight fall risk for patient and impair prolonged walking, especially when patient feels his R foot start to slap. Patient would benefit from adding treatment of lower leg into current PT for cervical radiculopathy. Impairments: abnormal gait, abnormal muscle firing, abnormal muscle tone, abnormal or restricted ROM, impaired physical strength, lacks appropriate home exercise program, pain with function, poor posture  and poor body mechanics    Symptom irritability: lowUnderstanding of Dx/Px/POC: good   Prognosis: good    Goals  Short term goals:  Patient is independent in home program to support plan of care and improve function. - 2 weeks  Patient demonstrates at least 70 deg. with cervical R rotation AROM so he can drive with less pain. - 3 weeks  Patient has balance and gait evaluated and incorporated into plan of care to reduce fall risk. - goal met  Patient demonstrates at least 15 seconds tandem stance to reduce risk of falls in home. - 2 weeks    Long term goals:  Patient demonstrates improvement in community participation with increase in FOTO score by 15%.  - 8 weeks  Patient can get out of bed and sleep without neck pain for improved quality of life. - 4 weeks  Patient demonstrates negative special tests for upper limb tension to correlate with low irritability and pain with ADLs. - 8 weeks  Patient demonstrates 90% cervical AROM without pain so he can perform all ADLs/IADLs without difficulty. 8 weeks  Patient demonstrates symmetrical calf muscle strength with plantar flexion test so he can walk long distances with less pain/limping. Plan  Patient would benefit from: skilled physical therapy  Planned therapy interventions: activity modification, joint mobilization, manual therapy, massage, neuromuscular re-education, patient education, postural training, strengthening, stretching, body mechanics training, therapeutic exercise, flexibility, functional ROM exercises, home exercise program and therapeutic activities  Frequency: 2x week  Duration in weeks: 8  Plan of Care beginning date: 11/30/2023  Plan of Care expiration date: 1/25/2024  Treatment plan discussed with: patient    Subjective Evaluation    History of Present Illness  Date of onset: 10/16/2023  Mechanism of injury: RE-EVALUATION 12/12/23: Re-evaluation done today to assess impairments related to walking and imbalance from calf pain after DVT. Patient was walking several months ago and felt like he got hit with a baseball bat, and had trouble moving. He went to hospital, eventually had US revealing DVT. Patient went to see hematologist, found he is predisposed to blood clotting, through genetic test so he will always be on blood thinner. He was recommended to come to PT to work on balance for his right leg. He reports that he feels he slaps his foot sometimes with walking, several times per day. Patient has PT prescription for cervical radiculopathy and right shoulder pain. Patient reports pain initially started in scapula but spread into shoulder, forearm and hand. Onset about 1 month ago, possibly due to walking change after getting DVT and blood clot.  Patient saw PCP, had Xray, and given prednisone pack. He reported only slight relief following. Symptoms: R sided shoulder blade and whole UE into all 5 fingers, numbness/tingling in hand when lying. Lying on left side with hand stretched out is most comfortable. Increased pain looking to right side, reaching down with right arm, moving arm quickly. Pain is constant. Patient denies visual disturbance, facial numbness, head aches, falls,      PMH: Deep brain stimulator 2000 for essential tremor (wire fronts from left pectoral area along left side of cervical spine and posterior cranium). DVT 1 month ago, currently on blood thinners.       Patient Goals  Patient goals for therapy: decreased pain and increased motion    Pain  Current pain ratin  At best pain ratin  At worst pain ratin        Objective     Postural Observations  Seated posture: fair      Palpation     Additional Palpation Details  Wire present subcutaneously along left side of cervical spine    Neurological Testing     Sensation   Cervical/Thoracic   Left   Intact: light touch    Right   Intact: light touch    Additional Neurological Details  Deep tendon reflexes: diminished but symmetrical b/l     Active Range of Motion   Cervical/Thoracic Spine       Cervical    Flexion:  WFL  Extension: 38 degrees      Left lateral flexion: 25 degrees      Right lateral flexion: 12 degrees      Left rotation: 60 degrees with pain  Right rotation: 60 degrees    with pain  Left Ankle/Foot   Dorsiflexion (ke): 20 degrees   Plantar flexion: 65 degrees   Inversion: 25 degrees   Eversion: 20 degrees     Right Ankle/Foot   Dorsiflexion (ke): 18 degrees   Plantar flexion: 65 degrees   Inversion: 45 degrees   Eversion: 15 degrees     Passive Range of Motion   Left Ankle/Foot    Dorsiflexion (ke): WFL  Plantar flexion: WFL  Inversion: WFL  Eversion: WFL    Right Ankle/Foot    Dorsiflexion (ke): WFL  Plantar flexion: WFL  Inversion: WFL  Eversion: WFL  Mechanical Assessment    Cervical    Seated retraction: repeated movements   Pain location: no change    Thoracic      Lumbar      Strength/Myotome Testing     Left Shoulder     Planes of Motion   Flexion: 4+   Extension: 5   Abduction: 4+     Isolated Muscles   Upper trapezius: 5     Right Shoulder     Planes of Motion   Flexion: 4+   Extension: 5   Abduction: 4+     Isolated Muscles   Upper trapezius: 5     Left Elbow   Flexion: 5  Extension: 5    Right Elbow   Flexion: 5  Extension: 5    Left Wrist/Hand   Wrist extension: 5  Wrist flexion: 5  Thumb extension: 5    Right Wrist/Hand   Wrist extension: 5  Wrist flexion: 5  Thumb extension: 5    Left Hip   Planes of Motion   Flexion: 5    Right Hip   Planes of Motion   Flexion: 4    Left Knee   Flexion: 5  Extension: 5    Right Knee   Flexion: 5  Extension: 5    Left Ankle/Foot   Dorsiflexion: 5  Plantar flexion: 3+  Inversion: 5  Eversion: 5  Great toe extension: 5    Right Ankle/Foot   Dorsiflexion: 5  Plantar flexion: 3-  Inversion: 5  Eversion: 5  Great toe extension: 5    Tests   Cervical   Positive cervical distraction test.  Negative alar ligament test and transverse ligament test.     Left   Negative Spurling's Test A. Right   Negative Spurling's Test A. Right Shoulder   Positive ULTT1, ULTT3 and ULTT4.      Additional Tests Details  Balance testin seconds tandem b/l  SLS unable  (+) latonya's DVT test on R             Precautions: Essential tremor with DBS (wire inserted along left side of cervical spine) EPOC 24      Manuals          Suboccipital release             R Sided unilateral cervical traction ANDREI  ANDREI ANDREI          TPR R upper trap  ANDREI ANDREI          Seated p-a mob upper Tspine   ANDREI          Neuro Re-Ed             Median nerve tensioner  x10           Median nerve flossing 2x10 x10 x10 x10         Ulnar nerve flossing  x10 x10 x10         Radial nerve flossing  x10 x10 x10         Rows with retraction  x10 gtb           Scap retraction  10x 10x                       Ther Ex             Cervical retraction  10x 10x          R upper trap stretch 3x15" 4x15" 4x15" 4x15"         Cervical AROM 5x5" ea 10x5" 10x5" 10x5"         Doorway pec major stretch  4x15"  nv 4x15"         Re-eval    30'                                                Ther Activity                                       Gait Training                                       Modalities             MHP end 8' 8' start 8' start 8' start

## 2023-12-14 ENCOUNTER — APPOINTMENT (OUTPATIENT)
Dept: PHYSICAL THERAPY | Facility: CLINIC | Age: 68
End: 2023-12-14
Payer: MEDICARE

## 2023-12-18 ENCOUNTER — OFFICE VISIT (OUTPATIENT)
Dept: PHYSICAL THERAPY | Facility: CLINIC | Age: 68
End: 2023-12-18
Payer: MEDICARE

## 2023-12-18 DIAGNOSIS — R26.9 NEUROLOGIC GAIT DYSFUNCTION: ICD-10-CM

## 2023-12-18 DIAGNOSIS — M25.511 ACUTE PAIN OF RIGHT SHOULDER: ICD-10-CM

## 2023-12-18 DIAGNOSIS — M54.12 CERVICAL RADICULOPATHY: Primary | ICD-10-CM

## 2023-12-18 DIAGNOSIS — I82.461 ACUTE DEEP VEIN THROMBOSIS (DVT) OF CALF MUSCLE VEIN OF RIGHT LOWER EXTREMITY (HCC): ICD-10-CM

## 2023-12-18 PROCEDURE — 97010 HOT OR COLD PACKS THERAPY: CPT | Performed by: PHYSICAL THERAPIST

## 2023-12-18 PROCEDURE — 97140 MANUAL THERAPY 1/> REGIONS: CPT | Performed by: PHYSICAL THERAPIST

## 2023-12-18 PROCEDURE — 97110 THERAPEUTIC EXERCISES: CPT | Performed by: PHYSICAL THERAPIST

## 2023-12-18 PROCEDURE — 97112 NEUROMUSCULAR REEDUCATION: CPT | Performed by: PHYSICAL THERAPIST

## 2023-12-18 NOTE — PROGRESS NOTES
"Daily Note     Today's date: 2023  Patient name: Doug Vargas  : 1955  MRN: [id number]  Referring provider: Lexi Moe*  Dx:   Encounter Diagnosis     ICD-10-CM    1. Cervical radiculopathy  M54.12       2. Acute pain of right shoulder  M25.511       3. Neurologic gait dysfunction  R26.9       4. Acute deep vein thrombosis (DVT) of calf muscle vein of right lower extremity (HCC)  I82.461                      Subjective: patient reports that his arm is still feeling a lot better. He has tried updated home program for balance and ankle muscle weakness, but notes continued difficulty with balance.      Objective: See treatment diary below      Assessment: Tolerated treatment well. Patient noted no pain with right cervical manual rotation with upglide vs self cervical rotation AROM. Patient demonstrated slight improvement in tandem balance compared to last visit. Patient continues to note relief in arm pain by end of session. Patient would benefit from continued skilled PT per plan of care to address impairments and improve function.         Plan: Continue per plan of care.      Precautions: Essential tremor with DBS (wire inserted along left side of cervical spine). Neurologist cleared use of joint mobilization and manual traction, but no direct pulling on wire or system. EPOC 24      Manuals         Suboccipital release             R Sided unilateral cervical traction ANDREI FORBES JP  ANDREI        TPR R upper trap  ANDREI FORBES JP        Right sided up glides gr 1-3     ANDREI        Seated p-a mob upper Tspine   ANDREI  ANDREI        Neuro Re-Ed             Median nerve tensioner  x10           Median nerve flossing 2x10 x10 x10 x10 x10        Ulnar nerve flossing  x10 x10 x10 x10        Radial nerve flossing  x10 x10 x10 x10        Rows with retraction  x10 gtb           Scap retraction  10x 10x  10x        Tandem stance     3x30\"        Ther Ex             Cervical retraction  10x " "10x  10x        R upper trap stretch 3x15\" 4x15\" 4x15\" 4x15\" 4x15\"        Cervical AROM 5x5\" ea 10x5\" 10x5\" 10x5\" 10x5\"        Doorway pec major stretch  4x15\"  nv 4x15\" 4x15\"        Re-eval    30'         Toe rises     20x                                  Ther Activity                                       Gait Training                                       Modalities             MHP end 8' 8' start 8' start 8' start 8' start with seated ex's                            "

## 2023-12-20 ENCOUNTER — OFFICE VISIT (OUTPATIENT)
Dept: PHYSICAL THERAPY | Facility: CLINIC | Age: 68
End: 2023-12-20
Payer: MEDICARE

## 2023-12-20 DIAGNOSIS — R26.9 NEUROLOGIC GAIT DYSFUNCTION: ICD-10-CM

## 2023-12-20 DIAGNOSIS — I82.461 ACUTE DEEP VEIN THROMBOSIS (DVT) OF CALF MUSCLE VEIN OF RIGHT LOWER EXTREMITY (HCC): ICD-10-CM

## 2023-12-20 DIAGNOSIS — M54.12 CERVICAL RADICULOPATHY: Primary | ICD-10-CM

## 2023-12-20 DIAGNOSIS — M25.511 ACUTE PAIN OF RIGHT SHOULDER: ICD-10-CM

## 2023-12-20 PROCEDURE — 97010 HOT OR COLD PACKS THERAPY: CPT | Performed by: PHYSICAL THERAPIST

## 2023-12-20 PROCEDURE — 97112 NEUROMUSCULAR REEDUCATION: CPT | Performed by: PHYSICAL THERAPIST

## 2023-12-20 PROCEDURE — 97140 MANUAL THERAPY 1/> REGIONS: CPT | Performed by: PHYSICAL THERAPIST

## 2023-12-20 PROCEDURE — 97110 THERAPEUTIC EXERCISES: CPT | Performed by: PHYSICAL THERAPIST

## 2023-12-20 NOTE — PROGRESS NOTES
Daily Note     Today's date: 2023  Patient name: Doug Vargas  : 1955  MRN: [id number]  Referring provider: Lexi Moe*  Dx:   Encounter Diagnosis     ICD-10-CM    1. Cervical radiculopathy  M54.12       2. Acute pain of right shoulder  M25.511       3. Neurologic gait dysfunction  R26.9       4. Acute deep vein thrombosis (DVT) of calf muscle vein of right lower extremity (HCC)  I82.461                      Subjective: patient reports that yesterday morning he woke up and had significant pain again, up to 7/10 in his arm and shoulder blade. Pain since reduced to 2-3/10.    Objective: See treatment diary below    Cervical AROM  22 deg R rotation  20 deg L rotation  35 deg ext    Assessment: Tolerated treatment well. Recommended patient only perform home program 3x per day vs close to 8x per day that patient was doing. Also reviewed proper cervical spine posture with pillow positioning at night. Patient noted complete resolution of symptoms following manual therapy. He required cuing to maintain proper cervical spine posture and avoid forward head position during pec stretch. Patient would benefit from continued skilled PT per plan of care to address impairments and improve function.       Plan: Continue per plan of care.      Precautions: Essential tremor with DBS (wire inserted along left side of cervical spine). Neurologist cleared use of joint mobilization and manual traction, but no direct pulling on wire or system. EPOC 24      Manuals        Suboccipital release             R Sided unilateral cervical traction ANDREI FORBES JP  ANDREI ANDREI       TPR R upper trap  ANDREI ANDREI  ANDREI ANDREI and infraspinatus       Right sided up glides gr 1-3     ANDREI NADREI       Seated p-a mob upper Tspine   ANDREI  ANDREI ANDREI       Neuro Re-Ed             Tandem walking      2x10 ft       Heel walking      2x10 ft       Median nerve tensioner  x10    x10       Median nerve flossing 2x10 x10 x10 x10  "x10 x10       Ulnar nerve flossing  x10 x10 x10 x10 x10       Radial nerve flossing  x10 x10 x10 x10 x10       Rows with retraction  x10 gtb    3x10 gtb       Shld ext with retraction      2x10 gtb       Scap retraction  10x 10x  10x 10x       Tandem stance     3x30\" 3x30\"       Ther Ex             Cervical retraction  10x 10x  10x 10x       R upper trap stretch 3x15\" 4x15\" 4x15\" 4x15\" 4x15\" 4x15\"       Cervical AROM 5x5\" ea 10x5\" 10x5\" 10x5\" 10x5\" 10x5\"       Doorway pec major stretch  4x15\"  nv 4x15\" 4x15\" 4x15\"       Re-eval    30'         Toe rises     20x 30x       Extension snags                          Ther Activity                                       Gait Training                                       Modalities             MHP end 8' 8' start 8' start 8' start 8' start with seated ex's 8' start with seated ex's                           "

## 2023-12-21 ENCOUNTER — APPOINTMENT (OUTPATIENT)
Dept: PHYSICAL THERAPY | Facility: CLINIC | Age: 68
End: 2023-12-21
Payer: MEDICARE

## 2023-12-26 ENCOUNTER — OFFICE VISIT (OUTPATIENT)
Dept: PHYSICAL THERAPY | Facility: CLINIC | Age: 68
End: 2023-12-26
Payer: MEDICARE

## 2023-12-26 DIAGNOSIS — R26.9 NEUROLOGIC GAIT DYSFUNCTION: ICD-10-CM

## 2023-12-26 DIAGNOSIS — M54.12 CERVICAL RADICULOPATHY: Primary | ICD-10-CM

## 2023-12-26 DIAGNOSIS — I82.461 ACUTE DEEP VEIN THROMBOSIS (DVT) OF CALF MUSCLE VEIN OF RIGHT LOWER EXTREMITY (HCC): ICD-10-CM

## 2023-12-26 DIAGNOSIS — M25.511 ACUTE PAIN OF RIGHT SHOULDER: ICD-10-CM

## 2023-12-26 PROCEDURE — 97010 HOT OR COLD PACKS THERAPY: CPT | Performed by: PHYSICAL THERAPIST

## 2023-12-26 PROCEDURE — 97140 MANUAL THERAPY 1/> REGIONS: CPT | Performed by: PHYSICAL THERAPIST

## 2023-12-26 PROCEDURE — 97112 NEUROMUSCULAR REEDUCATION: CPT | Performed by: PHYSICAL THERAPIST

## 2023-12-26 PROCEDURE — 97110 THERAPEUTIC EXERCISES: CPT | Performed by: PHYSICAL THERAPIST

## 2023-12-26 NOTE — PROGRESS NOTES
Daily Note     Today's date: 2023  Patient name: Doug Vargas  : 1955  MRN: [id number]  Referring provider: Lexi Moe*  Dx:   Encounter Diagnosis     ICD-10-CM    1. Cervical radiculopathy  M54.12       2. Acute pain of right shoulder  M25.511       3. Neurologic gait dysfunction  R26.9       4. Acute deep vein thrombosis (DVT) of calf muscle vein of right lower extremity (HCC)  I82.461                      Subjective: patient reports that his neck and shoulder/arm have been feeling pretty good. He gets mild pain, like 1/10 when lying on R side, but not bad. He still feel that his walk is off since the DVT in his R leg.    Objective: See treatment diary below      Assessment: Tolerated treatment well. Patient noted UE paraesthesias with left cervical side bending, and slight pain with right cervical side bending during manual therapy. He continues to report tenderness along upper trapezius but describes as positive sensation during manual therapy. Slight aggravation of UE symptoms by end of session, indicating still moderate irritability. Patient dispayed improved stability with tandem walking. Patient would benefit from continued skilled PT per plan of care to address impairments and improve function.       Plan: Continue per plan of care.      Precautions: Essential tremor with DBS (wire inserted along left side of cervical spine). Neurologist cleared use of joint mobilization and manual traction, but no direct pulling on wire or system. EPOC 24      Manuals       Suboccipital release             R Sided unilateral cervical traction ANDREI  ANDREI ANDREI  ANDREI ANDREI ANDREI      TPR R upper trap  ANDREI ANDREI  ANDREI ANDREI and infraspinatus ANDREI      Right sided up glides gr 1-3     ANDREI ANDREI ANDREI      Seated p-a mob upper Tspine   ANDREI  ANDREI ANDREI ANDREI      Neuro Re-Ed             Tandem walking      2x10 ft 2x12      Heel walking      2x10 ft 4x10      Median nerve tensioner  x10    x10 x10    "   Median nerve flossing 2x10 x10 x10 x10 x10 x10 x10      Ulnar nerve flossing  x10 x10 x10 x10 x10 x10      Radial nerve flossing  x10 x10 x10 x10 x10 x10      Rows with retraction  x10 gtb    3x10 gtb 3x10 gtb      Shld ext with retraction      2x10 gtb 3x10 gtb      Scap retraction  10x 10x  10x 10x 10x      Tandem stance     3x30\" 3x30\" 3x30\"      Ther Ex             Cervical retraction  10x 10x  10x 10x 10x      R upper trap stretch 3x15\" 4x15\" 4x15\" 4x15\" 4x15\" 4x15\" 5x10\"      Cervical AROM 5x5\" ea 10x5\" 10x5\" 10x5\" 10x5\" 10x5\" 10x5\"      Doorway pec major stretch  4x15\"  nv 4x15\" 4x15\" 4x15\" 4x15\"      Re-eval    30'         Toe rises     20x 30x       Extension snags                          Ther Activity                                       Gait Training                                       Modalities             MHP end 8' 8' start 8' start 8' start 8' start with seated ex's 8' start with seated ex's 8' start with seated ex's                          "

## 2023-12-28 ENCOUNTER — OFFICE VISIT (OUTPATIENT)
Dept: PHYSICAL THERAPY | Facility: CLINIC | Age: 68
End: 2023-12-28
Payer: MEDICARE

## 2023-12-28 DIAGNOSIS — M54.12 CERVICAL RADICULOPATHY: Primary | ICD-10-CM

## 2023-12-28 DIAGNOSIS — I82.461 ACUTE DEEP VEIN THROMBOSIS (DVT) OF CALF MUSCLE VEIN OF RIGHT LOWER EXTREMITY (HCC): ICD-10-CM

## 2023-12-28 DIAGNOSIS — R26.9 NEUROLOGIC GAIT DYSFUNCTION: ICD-10-CM

## 2023-12-28 DIAGNOSIS — M25.511 ACUTE PAIN OF RIGHT SHOULDER: ICD-10-CM

## 2023-12-28 PROCEDURE — 97110 THERAPEUTIC EXERCISES: CPT | Performed by: PHYSICAL THERAPIST

## 2023-12-28 PROCEDURE — 97010 HOT OR COLD PACKS THERAPY: CPT | Performed by: PHYSICAL THERAPIST

## 2023-12-28 PROCEDURE — 97140 MANUAL THERAPY 1/> REGIONS: CPT | Performed by: PHYSICAL THERAPIST

## 2023-12-28 PROCEDURE — 97112 NEUROMUSCULAR REEDUCATION: CPT | Performed by: PHYSICAL THERAPIST

## 2023-12-28 NOTE — PROGRESS NOTES
Daily Note     Today's date: 2023  Patient name: Doug Vargas  : 1955  MRN: [id number]  Referring provider: Lexi Moe*  Dx:   Encounter Diagnosis     ICD-10-CM    1. Cervical radiculopathy  M54.12       2. Acute pain of right shoulder  M25.511       3. Neurologic gait dysfunction  R26.9       4. Acute deep vein thrombosis (DVT) of calf muscle vein of right lower extremity (HCC)  I82.461                      Subjective: patient reports that he hasn't had much pain since last session. He has difficulty maintaining balance with tandem walking.      Objective: See treatment diary below      Assessment: Tolerated treatment well. Patient had low irritability of symptoms today. Patient only had provocation of symptoms during shoulder rows with just paraesthesias but is subsided quickly. Instructed patient on utlizing arm swing with walking to reduce tension on R neural structures and to improve balance with ambulation. Patient would benefit from continued skilled PT per plan of care to address impairments and improve function.       Plan: Continue per plan of care.      Precautions: Essential tremor with DBS (wire inserted along left side of cervical spine). Neurologist cleared use of joint mobilization and manual traction, but no direct pulling on wire or system. EPOC 24      Manuals      Suboccipital release             R Sided unilateral cervical traction ANDREI  ANDREI ANDREI  ANDREI ANDREI ANDREI ANDREI     TPR R upper trap  ANDREI ANDREI  ANDREI ANDREI and infraspinatus ANDREI ANDREI     Right sided up glides gr 1-3     ANDREI ANDREI ANDREI ANDREI     Seated p-a mob upper Tspine   ANDREI  ANDREI ANDREI ANDREI ANDREI     Neuro Re-Ed             Tandem walking      2x10 ft 2x12 2x12 ft     Heel walking      2x10 ft 4x10 4x10 ft     Median nerve tensioner  x10    x10 x10 x10     Median nerve flossing 2x10 x10 x10 x10 x10 x10 x10 x10     Ulnar nerve flossing  x10 x10 x10 x10 x10 x10 x10     Radial nerve flossing  x10 x10 x10 x10  "x10 x10 x10     No money        10x5\"     Rows with retraction  x10 gtb    3x10 gtb 3x10 gtb 3x10 gtb     Shld ext with retraction      2x10 gtb 3x10 gtb 3x10 gtb     Scap retraction  10x 10x  10x 10x 10x      Tandem stance     3x30\" 3x30\" 3x30\" 3x30\"     Ther Ex             Cervical retraction  10x 10x  10x 10x 10x 10x     R upper trap stretch 3x15\" 4x15\" 4x15\" 4x15\" 4x15\" 4x15\" 5x10\" 5x10\"     Cervical AROM 5x5\" ea 10x5\" 10x5\" 10x5\" 10x5\" 10x5\" 10x5\" 10x5\"     Doorway pec major stretch  4x15\"  nv 4x15\" 4x15\" 4x15\" 4x15\" 4x15\"                  Toe rises     20x 30x  30x     Extension snags                          Ther Activity                                       Gait Training             Emphasis on arm swing        2'                  Modalities             MHP end 8' 8' start 8' start 8' start 8' start with seated ex's 8' start with seated ex's 8' start with seated ex's 8' end with seated ex's                         "

## 2024-01-02 ENCOUNTER — OFFICE VISIT (OUTPATIENT)
Dept: PHYSICAL THERAPY | Facility: CLINIC | Age: 69
End: 2024-01-02
Payer: MEDICARE

## 2024-01-02 DIAGNOSIS — I82.461 ACUTE DEEP VEIN THROMBOSIS (DVT) OF CALF MUSCLE VEIN OF RIGHT LOWER EXTREMITY (HCC): ICD-10-CM

## 2024-01-02 DIAGNOSIS — M25.511 ACUTE PAIN OF RIGHT SHOULDER: ICD-10-CM

## 2024-01-02 DIAGNOSIS — R26.9 NEUROLOGIC GAIT DYSFUNCTION: ICD-10-CM

## 2024-01-02 DIAGNOSIS — M54.12 CERVICAL RADICULOPATHY: Primary | ICD-10-CM

## 2024-01-02 PROCEDURE — 97010 HOT OR COLD PACKS THERAPY: CPT | Performed by: PHYSICAL THERAPIST

## 2024-01-02 PROCEDURE — 97112 NEUROMUSCULAR REEDUCATION: CPT | Performed by: PHYSICAL THERAPIST

## 2024-01-02 PROCEDURE — 97110 THERAPEUTIC EXERCISES: CPT | Performed by: PHYSICAL THERAPIST

## 2024-01-02 PROCEDURE — 97140 MANUAL THERAPY 1/> REGIONS: CPT | Performed by: PHYSICAL THERAPIST

## 2024-01-02 NOTE — PROGRESS NOTES
Daily Note     Today's date: 2024  Patient name: Doug Vargas  : 1955  MRN: [id number]  Referring provider: Lexi Moe*  Dx:   Encounter Diagnosis     ICD-10-CM    1. Cervical radiculopathy  M54.12       2. Acute pain of right shoulder  M25.511       3. Neurologic gait dysfunction  R26.9       4. Acute deep vein thrombosis (DVT) of calf muscle vein of right lower extremity (HCC)  I82.461                      Subjective: patient reports that his arm and neck have been feeling pretty good, main discomfort when doing median nerve tension exercises but it resolves quickly.      Objective: See treatment diary below      Assessment: Tolerated treatment well. Progressed balance exercises with addition of SLS which he had significant difficulty maintaining balance with. Little to no provocation of radicular symptoms with exercises today, including R cervical rotation. Patient is progressing well with therapy and would benefit from continued skilled PT per plan of care to address impairments and improve function.       Plan: Continue per plan of care.      Precautions: Essential tremor with DBS (wire inserted along left side of cervical spine). Neurologist cleared use of joint mobilization and manual traction, but no direct pulling on wire or system. EPOC 24      Manuals     Suboccipital release             R Sided unilateral cervical traction ANDREI  ANDREI ANDREI  ANDREI ANDREI ANDREI ANDREI ANDREI    TPR R upper trap  ANDREI ANDREI  ANDREI ANDREI and infraspinatus ANDREI ANDREI ANDREI    Right sided up glides gr 1-3     ANDRIE ANDREI ANDREI ANDREI ANDREI    Seated p-a mob upper Tspine   ANDREI  ANDREI ANDREI ANDREI ANDREI ANDREI    Neuro Re-Ed             Tandem walking      2x10 ft 2x12 2x12 ft 2x12 ft    Heel walking      2x10 ft 4x10 4x10 ft     Median nerve tensioner  x10    x10 x10 x10 x10    Median nerve flossing 2x10 x10 x10 x10 x10 x10 x10 x10 x10    Ulnar nerve flossing  x10 x10 x10 x10 x10 x10 x10 x10    Radial nerve flossing  x10 x10  "x10 x10 x10 x10 x10 x10    No money        10x5\"     Rows with retraction  x10 gtb    3x10 gtb 3x10 gtb 3x10 gtb 3x10 bltb    Shld ext with retraction      2x10 gtb 3x10 gtb 3x10 gtb 3x10 gtb    Scap retraction  10x 10x  10x 10x 10x      SLS         2x20\"     Tandem stance     3x30\" 3x30\" 3x30\" 3x30\" 3x30\"    Ther Ex             Cervical retraction  10x 10x  10x 10x 10x 10x 10x    R upper trap stretch 3x15\" 4x15\" 4x15\" 4x15\" 4x15\" 4x15\" 5x10\" 5x10\"     Cervical AROM 5x5\" ea 10x5\" 10x5\" 10x5\" 10x5\" 10x5\" 10x5\" 10x5\" 10x5\"    Doorway pec major stretch  4x15\"  nv 4x15\" 4x15\" 4x15\" 4x15\" 4x15\" 4x15\"                 Toe rises     20x 30x  30x 40x    Extension snags                          Ther Activity                                       Gait Training             Emphasis on arm swing        2'                  Modalities             MHP end 8' 8' start 8' start 8' start 8' start with seated ex's 8' start with seated ex's 8' start with seated ex's 8' end with seated ex's 8' end w/seated ex's                        "

## 2024-01-04 ENCOUNTER — APPOINTMENT (OUTPATIENT)
Dept: PHYSICAL THERAPY | Facility: CLINIC | Age: 69
End: 2024-01-04
Payer: MEDICARE

## 2024-01-09 ENCOUNTER — OFFICE VISIT (OUTPATIENT)
Dept: PHYSICAL THERAPY | Facility: CLINIC | Age: 69
End: 2024-01-09
Payer: MEDICARE

## 2024-01-09 DIAGNOSIS — M54.12 CERVICAL RADICULOPATHY: Primary | ICD-10-CM

## 2024-01-09 DIAGNOSIS — R26.9 NEUROLOGIC GAIT DYSFUNCTION: ICD-10-CM

## 2024-01-09 DIAGNOSIS — M25.511 ACUTE PAIN OF RIGHT SHOULDER: ICD-10-CM

## 2024-01-09 DIAGNOSIS — I82.461 ACUTE DEEP VEIN THROMBOSIS (DVT) OF CALF MUSCLE VEIN OF RIGHT LOWER EXTREMITY (HCC): ICD-10-CM

## 2024-01-09 PROCEDURE — 97112 NEUROMUSCULAR REEDUCATION: CPT | Performed by: PHYSICAL THERAPIST

## 2024-01-09 PROCEDURE — 97110 THERAPEUTIC EXERCISES: CPT | Performed by: PHYSICAL THERAPIST

## 2024-01-09 PROCEDURE — 97140 MANUAL THERAPY 1/> REGIONS: CPT | Performed by: PHYSICAL THERAPIST

## 2024-01-09 NOTE — PROGRESS NOTES
Daily Note     Today's date: 2024  Patient name: Doug Vargas  : 1955  MRN: [id number]  Referring provider: Lexi Moe*  Dx:   Encounter Diagnosis     ICD-10-CM    1. Cervical radiculopathy  M54.12       2. Acute pain of right shoulder  M25.511       3. Neurologic gait dysfunction  R26.9       4. Acute deep vein thrombosis (DVT) of calf muscle vein of right lower extremity (HCC)  I82.461                      Subjective: patient reports that he hasn't really had much arm pain lately and feels that it has mostly resolved. He also reports that he feels his walking is almost back to normal from before he had the DVT.      Objective: See treatment diary below      Assessment: Tolerated treatment well. Progressed balance exercises with rockerboard M-L and A-P to improve ankle strategy. No pain or parasthesias provocation during manual therapy or TE completion. Patient did require cuing to prevent upper trap compensation with shoulder extension and rows. Patient is progressing well with therapy and would benefit from continued skilled PT per plan of care to address impairments and improve function.       Plan: Continue per plan of care. Consider d/c treatment of cervical spine and LE pending no flare up later this week.      Precautions: Essential tremor with DBS (wire inserted along left side of cervical spine). Neurologist cleared use of joint mobilization and manual traction, but no direct pulling on wire or system. EPOC 24      Manuals    Suboccipital release             R Sided unilateral cervical traction ANDREI  ANDREI ANDREI  ANDREI ANDREI ANDREI ANDREI ANDREI ANDREI   TPR R upper trap  ANDREI ANDREI  ANDREI ANDREI and infraspinatus ANDREI ANDREI ANDREI ANDREI   Right sided up glides gr 1-3     ANDREI ANDREI ANDREI ANDREI ANDREI ANDREI   Seated p-a mob upper Tspine   ANDREI  ANDREI ANDREI ANDREI ANDREI ANDREI ANDREI   Neuro Re-Ed             Tandem walking      2x10 ft 2x12 2x12 ft 2x12 ft 2x12  ft   Heel walking      2x10 ft 4x10 4x10 ft     Median  "nerve tensioner  x10    x10 x10 x10 x10    Median nerve flossing 2x10 x10 x10 x10 x10 x10 x10 x10 x10    Ulnar nerve flossing  x10 x10 x10 x10 x10 x10 x10 x10    Radial nerve flossing  x10 x10 x10 x10 x10 x10 x10 x10    No money        10x5\"     Rows with retraction  x10 gtb    3x10 gtb 3x10 gtb 3x10 gtb 3x10 bltb 3x10 stb   Shld ext with retraction      2x10 gtb 3x10 gtb 3x10 gtb 3x10 gtb 3x10 gtb   Scap retraction  10x 10x  10x 10x 10x      SLS         2x20\"  2x20\"   Tandem stance     3x30\" 3x30\" 3x30\" 3x30\" 3x30\" 3x30\"   Rocker board m/l, a/p          20x ea   Ther Ex             Cervical retraction  10x 10x  10x 10x 10x 10x 10x 10x   R upper trap stretch 3x15\" 4x15\" 4x15\" 4x15\" 4x15\" 4x15\" 5x10\" 5x10\"     Cervical AROM 5x5\" ea 10x5\" 10x5\" 10x5\" 10x5\" 10x5\" 10x5\" 10x5\" 10x5\" 15x5\"   Doorway pec major stretch  4x15\"  nv 4x15\" 4x15\" 4x15\" 4x15\" 4x15\" 4x15\" 4x15\"                Toe rises     20x 30x  30x 40x 40x   Extension snags                          Ther Activity                                       Gait Training             Emphasis on arm swing        2'                  Modalities             MHP end 8' 8' start 8' start 8' start 8' start with seated ex's 8' start with seated ex's 8' start with seated ex's 8' end with seated ex's 8' end w/seated ex's                        "

## 2024-01-11 ENCOUNTER — OFFICE VISIT (OUTPATIENT)
Dept: PHYSICAL THERAPY | Facility: CLINIC | Age: 69
End: 2024-01-11
Payer: MEDICARE

## 2024-01-11 DIAGNOSIS — M25.511 ACUTE PAIN OF RIGHT SHOULDER: ICD-10-CM

## 2024-01-11 DIAGNOSIS — M54.12 CERVICAL RADICULOPATHY: Primary | ICD-10-CM

## 2024-01-11 DIAGNOSIS — R26.9 NEUROLOGIC GAIT DYSFUNCTION: ICD-10-CM

## 2024-01-11 DIAGNOSIS — I82.461 ACUTE DEEP VEIN THROMBOSIS (DVT) OF CALF MUSCLE VEIN OF RIGHT LOWER EXTREMITY (HCC): ICD-10-CM

## 2024-01-11 PROCEDURE — 97140 MANUAL THERAPY 1/> REGIONS: CPT | Performed by: PHYSICAL THERAPIST

## 2024-01-11 PROCEDURE — 97110 THERAPEUTIC EXERCISES: CPT | Performed by: PHYSICAL THERAPIST

## 2024-01-11 NOTE — PROGRESS NOTES
Daily Note/ Discharge Summary     Today's date: 2024  Patient name: Doug Vargas  : 1955  MRN: [id number]  Referring provider: Lexi Moe*  Dx:   Encounter Diagnosis     ICD-10-CM    1. Cervical radiculopathy  M54.12       2. Acute pain of right shoulder  M25.511       3. Neurologic gait dysfunction  R26.9       4. Acute deep vein thrombosis (DVT) of calf muscle vein of right lower extremity (HCC)  I82.461                      Subjective: patient reports that he is feeling better and ready to start working on his lower back. Patient expressed that since the start of care he has felt his neck rotation has increased and his calf pain has decreased. Patient feels ready for discharge with [facility] for neck and balance treatment.       Objective: See treatment diary below    Cervical     Flexion:  WFL  Extension: 50 degrees   Left lateral flexion: 25 degrees   Right lateral flexion: 34 degrees   Left rotation: 72  Right rotation: 74 with pain     Balance testing:   Tandem: R:26 L:12  SLS: R:2 L: 6  (+) latonya's DVT test on R    Plantarflexion Calf Raises:  R: 6 (pain 4-)  L: 14 (4+)    Assessment: Tolerated treatment well. Patient has been treated in therapy for 6 weeks for management of cervical radiculopathy and balance issues. Patient has shown significant improvement in his AROM of cervical extension and right lateral flexion, balance with single leg stance, and tandem stance balance. Functionally, patient reports little to no arm pain, and improvement in his gait pattern. At this time, patient is appropriate for d/c to home program to maintain improvements and further progress. He is in agreement with this plan.       Plan: d/c to home program.     Precautions: Essential tremor with DBS (wire inserted along left side of cervical spine). Neurologist cleared use of joint mobilization and manual traction, but no direct pulling on wire or system. EPOC 24      Manuals   "12/26 12/28 1/2 1/9 1/11   Suboccipital release             R Sided unilateral cervical traction ANDREI ANDREI  ANDREI ANDREI ANDREI ANDREI ANDREI ANDREI ANDREI   TPR R upper trap ANDREI ANDREI  ANDREI ANDREI and infraspinatus ANDREI ANDREI ANDREI ANDREI ANDREI   Right sided up glides gr 1-3    ANDREI ANDREI ANDREI ANDREI ANDREI ANDREI ANDREI   Seated p-a mob upper Tspine  ANDREI  ANDREI ANDREI ANDREI ANDREI ANDREI ANDREI ANDREI   Neuro Re-Ed             Tandem walking     2x10 ft 2x12 2x12 ft 2x12 ft 2x12  ft    Heel walking     2x10 ft 4x10 4x10 ft      Median nerve tensioner x10    x10 x10 x10 x10  20x   Median nerve flossing x10 x10 x10 x10 x10 x10 x10 x10     Ulnar nerve flossing x10 x10 x10 x10 x10 x10 x10 x10     Radial nerve flossing x10 x10 x10 x10 x10 x10 x10 x10     No money       10x5\"      Rows with retraction x10 gtb    3x10 gtb 3x10 gtb 3x10 gtb 3x10 bltb 3x10 stb    Shld ext with retraction     2x10 gtb 3x10 gtb 3x10 gtb 3x10 gtb 3x10 gtb    Scap retraction 10x 10x  10x 10x 10x       SLS        2x20\"  2x20\"    Tandem stance    3x30\" 3x30\" 3x30\" 3x30\" 3x30\" 3x30\"    Rocker board m/l, a/p         20x ea    Ther Ex             Cervical retraction 10x 10x  10x 10x 10x 10x 10x 10x 10x   R upper trap stretch 4x15\" 4x15\" 4x15\" 4x15\" 4x15\" 5x10\" 5x10\"   4x10\"   Cervical AROM 10x5\" 10x5\" 10x5\" 10x5\" 10x5\" 10x5\" 10x5\" 10x5\" 15x5\" 10x5\"   Doorway pec major stretch 4x15\"  nv 4x15\" 4x15\" 4x15\" 4x15\" 4x15\" 4x15\" 4x15\" 4x15\"   Assessment associated with progress report          10'   Toe rises    20x 30x  30x 40x 40x    Extension snags                          Ther Activity                                       Gait Training             Emphasis on arm swing       2'                   Modalities             MHP end 8' start 8' start 8' start 8' start with seated ex's 8' start with seated ex's 8' start with seated ex's 8' end with seated ex's 8' end w/seated ex's                         "

## 2024-01-22 ENCOUNTER — EVALUATION (OUTPATIENT)
Dept: PHYSICAL THERAPY | Facility: CLINIC | Age: 69
End: 2024-01-22
Payer: MEDICARE

## 2024-01-22 DIAGNOSIS — M54.50 CHRONIC MIDLINE LOW BACK PAIN WITHOUT SCIATICA: Primary | ICD-10-CM

## 2024-01-22 DIAGNOSIS — G89.29 CHRONIC MIDLINE LOW BACK PAIN WITHOUT SCIATICA: Primary | ICD-10-CM

## 2024-01-22 PROCEDURE — 97161 PT EVAL LOW COMPLEX 20 MIN: CPT | Performed by: PHYSICAL THERAPIST

## 2024-01-22 PROCEDURE — 97010 HOT OR COLD PACKS THERAPY: CPT | Performed by: PHYSICAL THERAPIST

## 2024-01-22 PROCEDURE — 97110 THERAPEUTIC EXERCISES: CPT | Performed by: PHYSICAL THERAPIST

## 2024-01-22 NOTE — PROGRESS NOTES
PT Evaluation     Today's date: 2024  Patient name: Doug Vargas  : 1955  MRN: [id number]  Referring provider: Lexi Moe*  Dx:   Encounter Diagnosis     ICD-10-CM    1. Chronic midline low back pain without sciatica  M54.50     G89.29                      Assessment  Assessment details: Patient is a 68 y.o. male with PT prescription for low back pain. Patient presents with decreased lumbar spine AROM and joint mobility throughout, core muscle weakness, decreased hip and hamstrings flexibility, (+) neural tension, and poor postural awareness and body mechanics. Patient had good initial response to extension exercises today, with provisional extension directional preference. His impairments limit patient with getting up from a chair, getting out of bed, heavy lifting with household chores, prolonged standing, and sitting without back support.. To address impairments and improve function, patient would benefit from skilled PT consisting of manual therapy to improve ROM and joint mobility, therapeutic exercises to improve core strength and flexibility, neuromuscular reeducation to improve core stabilization, and patient education on home program and posture/proper body mechanics.    Impairments: abnormal muscle tone, abnormal or restricted ROM, impaired physical strength, lacks appropriate home exercise program, pain with function, poor posture  and poor body mechanics    Symptom irritability: moderateUnderstanding of Dx/Px/POC: good   Prognosis: good    Goals  Short term goals:  Patient is independent in home program to support plan of care and improve function. - 2 weeks  Patient demonstrates at least 75% lumbar extension ROM so he can stand with meal prep and less pain. - 3 weeks  Patient demonstrates proper body mechanics with bending and lifting so he can perform ADLs with less pain. - 2 weeks    Long term goals:  Patient demonstrates improvement in community participation with increase  "in FOTO score by 15%. - 8 weeks  Patient can demonstrates 75% lumbar AROM in all directions so he can perform household chores without back pain. - 8 weeks  Patient demonstrates 4+ core strength so he can lift heavy groceries and objects around home when performing household management without pain. - 8 weeks  Patient has negative SLR and slump testing to correlate with low irritability of pain when transferring and getting out of bed. 4 weeks    Plan  Patient would benefit from: skilled physical therapy  Planned therapy interventions: abdominal trunk stabilization, activity modification, joint mobilization, manual therapy, massage, neuromuscular re-education, patient education, postural training, strengthening, stretching, body mechanics training, therapeutic exercise, flexibility, functional ROM exercises, home exercise program and IASTM  Frequency: 2x week  Duration in weeks: 8  Plan of Care beginning date: 1/22/2024  Plan of Care expiration date: 3/22/2024  Treatment plan discussed with: patient    Subjective Evaluation    History of Present Illness  Date of onset: 1/1/2014  Mechanism of injury: Patient has PT prescription for low back pain. Patient reports about 10 year history of low back pain, but worsening of pain over the past 2-3 years. Patient had imaging done years ago which showed some \"disc issues.\"    Symptoms: central low pain. Pain increases with prolonged standing (> 5 minutes), when working around the home when bending to the one side, lifting heavier objects with household chores (moving furniture) getting out of bed, getting up from a seated position if not using his arms, with prolonged sitting if on a bench (no back support). Upon questioning, patient reports pain in R posterior thigh when getting out of bed but only 1x per month.    Patient denies sleep disturbance, bowel bladder dysfunction, saddle parasthesias, unexplained weight loss, h/o of cancer    PMH: skin cancer (yearly check ups), " Deep brain stimulator 2000 for essential tremor (wire fronts from left pectoral area along left side of cervical spine and posterior cranium), h/o DVT  Patient Goals  Patient goals for therapy: decreased pain, increased motion, increased strength and independence with ADLs/IADLs    Pain  Current pain ratin  At best pain ratin  At worst pain ratin        Objective     Postural Observations  Seated posture: fair      Palpation   Left   Hypertonic in the erector spinae and quadratus lumborum.   Tenderness of the erector spinae.   Trigger point to quadratus lumborum.     Right   Hypertonic in the erector spinae and quadratus lumborum.   Tenderness of the erector spinae.   Trigger point to quadratus lumborum.     Neurological Testing     Sensation     Lumbar   Left   Intact: light touch    Right   Intact: light touch    Reflexes   Left   Patellar (L4): trace (1+)  Achilles (S1): normal (2+)    Right   Patellar (L4): trace (1+)  Achilles (S1): normal (2+)    Additional Neurological Details  Myotomes: WNL      Active Range of Motion     Additional Active Range of Motion Details  Lumbar AROM %:  Flexion 50% pain  Extension 50% pain  R side bending 50% pain  L side bending 50% pain  R rotation 75% pain  L rotation 75% pain    Repeated motion testing  FIS (Flexion in standing) : increase, NW  RFIS: increase NW  EIS (Extension in standing) : increase  JAXON : increase, NW  FAUSTINA (flexion in lying) : Increase, W  RFIL: Increase W  EIL (extension in lying)  REIL:    Joint Play     Hypomobile: L1, L2, L3, L4 and L5     Pain: L1, L2, L3, L4 and L5   L1 comments: improved following press ups  L2 comments: improved following press ups  L3 comments: improved following press ups  L4 comments: improved following press ups  L5 comments: improved following press ups    Muscle Activation     Additional Muscle Activation Details  TA contraction: able and painfree  Posterior pelvic tilt: able and painfree  Abdominal curl up:  moderate difficulty and pain  Bridge: unable today and pain  4-/5 core strength    Tests     Lumbar     Left   Positive passive SLR and slump test.     Right   Positive passive SLR.   Negative slump test.     General Comments:      Hip Comments   R hip PROM screen : moderate hamstrings, moderate to severe hip flexion restriction  L hip PROM screen : moderate hamstrings, mild ER, moderate flexion restriction           Precautions: skin cancer, Deep brain stimulator, h/o DVT EPOC 3/22/24    Manuals 1/22            p-a mobs lumbar spine 3-4                          STM paraspinals                          Neuro Re-Ed                          TA bracing             PPT             Bridge with brace                          Ther Ex             Press ups 2x10                         Pt edu on plan of care, pathology, home program 8'            LTR             Hamstring stretch             Piriformis stretch                                       Ther Activity                                       Gait Training                                       Modalities             MHP end

## 2024-01-22 NOTE — LETTER
2024    Lexi Moe, DO  [address] [address]    Patient: Doug Vargas   YOB: 1955   Date of Visit: 2024     Encounter Diagnosis     ICD-10-CM    1. Chronic midline low back pain without sciatica  M54.50     G89.29           Dear Dr. Moe:    Thank you for your recent referral of Doug Vargas. Please review the attached evaluation summary from Doug's recent visit.     Please verify that you agree with the plan of care by signing the attached order.     If you have any questions or concerns, please do not hesitate to call.     I sincerely appreciate the opportunity to share in the care of one of your patients and hope to have another opportunity to work with you in the near future.       Sincerely,    DEANN Hanson, PT      Referring Provider:      I certify that I have read the below Plan of Care and certify the need for these services furnished under this plan of treatment while under my care.                    Lexi Moe,   [address] [address]  Via Fax: [phone]          PT Evaluation     Today's date: 2024  Patient name: Doug Vargas  : 1955  MRN: [id number]  Referring provider: Lexi Moe*  Dx:   Encounter Diagnosis     ICD-10-CM    1. Chronic midline low back pain without sciatica  M54.50     G89.29                      Assessment  Assessment details: Patient is a 68 y.o. male with PT prescription for low back pain. Patient presents with decreased lumbar spine AROM and joint mobility throughout, core muscle weakness, decreased hip and hamstrings flexibility, (+) neural tension, and poor postural awareness and body mechanics. Patient had good initial response to extension exercises today, with provisional extension directional preference. His impairments limit patient with getting up from a chair, getting out of bed, heavy lifting with household chores, prolonged standing, and  sitting without back support.. To address impairments and improve function, patient would benefit from skilled PT consisting of manual therapy to improve ROM and joint mobility, therapeutic exercises to improve core strength and flexibility, neuromuscular reeducation to improve core stabilization, and patient education on home program and posture/proper body mechanics.    Impairments: abnormal muscle tone, abnormal or restricted ROM, impaired physical strength, lacks appropriate home exercise program, pain with function, poor posture  and poor body mechanics    Symptom irritability: moderateUnderstanding of Dx/Px/POC: good   Prognosis: good    Goals  Short term goals:  Patient is independent in home program to support plan of care and improve function. - 2 weeks  Patient demonstrates at least 75% lumbar extension ROM so he can stand with meal prep and less pain. - 3 weeks  Patient demonstrates proper body mechanics with bending and lifting so he can perform ADLs with less pain. - 2 weeks    Long term goals:  Patient demonstrates improvement in community participation with increase in FOTO score by 15%. - 8 weeks  Patient can demonstrates 75% lumbar AROM in all directions so he can perform household chores without back pain. - 8 weeks  Patient demonstrates 4+ core strength so he can lift heavy groceries and objects around home when performing household management without pain. - 8 weeks  Patient has negative SLR and slump testing to correlate with low irritability of pain when transferring and getting out of bed. 4 weeks    Plan  Patient would benefit from: skilled physical therapy  Planned therapy interventions: abdominal trunk stabilization, activity modification, joint mobilization, manual therapy, massage, neuromuscular re-education, patient education, postural training, strengthening, stretching, body mechanics training, therapeutic exercise, flexibility, functional ROM exercises, home exercise program and  "IASTM  Frequency: 2x week  Duration in weeks: 8  Plan of Care beginning date: 2024  Plan of Care expiration date: 3/22/2024  Treatment plan discussed with: patient    Subjective Evaluation    History of Present Illness  Date of onset: 2014  Mechanism of injury: Patient has PT prescription for low back pain. Patient reports about 10 year history of low back pain, but worsening of pain over the past 2-3 years. Patient had imaging done years ago which showed some \"disc issues.\"    Symptoms: central low pain. Pain increases with prolonged standing (> 5 minutes), when working around the home when bending to the one side, lifting heavier objects with household chores (moving furniture) getting out of bed, getting up from a seated position if not using his arms, with prolonged sitting if on a bench (no back support). Upon questioning, patient reports pain in R posterior thigh when getting out of bed but only 1x per month.    Patient denies sleep disturbance, bowel bladder dysfunction, saddle parasthesias, unexplained weight loss, h/o of cancer    PMH: skin cancer (yearly check ups), Deep brain stimulator 2000 for essential tremor (wire fronts from left pectoral area along left side of cervical spine and posterior cranium), h/o DVT  Patient Goals  Patient goals for therapy: decreased pain, increased motion, increased strength and independence with ADLs/IADLs    Pain  Current pain ratin  At best pain ratin  At worst pain ratin        Objective     Postural Observations  Seated posture: fair      Palpation   Left   Hypertonic in the erector spinae and quadratus lumborum.   Tenderness of the erector spinae.   Trigger point to quadratus lumborum.     Right   Hypertonic in the erector spinae and quadratus lumborum.   Tenderness of the erector spinae.   Trigger point to quadratus lumborum.     Neurological Testing     Sensation     Lumbar   Left   Intact: light touch    Right   Intact: light touch    Reflexes "   Left   Patellar (L4): trace (1+)  Achilles (S1): normal (2+)    Right   Patellar (L4): trace (1+)  Achilles (S1): normal (2+)    Additional Neurological Details  Myotomes: WNL      Active Range of Motion     Additional Active Range of Motion Details  Lumbar AROM %:  Flexion 50% pain  Extension 50% pain  R side bending 50% pain  L side bending 50% pain  R rotation 75% pain  L rotation 75% pain    Repeated motion testing  FIS (Flexion in standing) : increase, NW  RFIS: increase NW  EIS (Extension in standing) : increase  JAXON : increase, NW  FAUSTINA (flexion in lying) : Increase, W  RFIL: Increase W  EIL (extension in lying)  REIL:    Joint Play     Hypomobile: L1, L2, L3, L4 and L5     Pain: L1, L2, L3, L4 and L5   L1 comments: improved following press ups  L2 comments: improved following press ups  L3 comments: improved following press ups  L4 comments: improved following press ups  L5 comments: improved following press ups    Muscle Activation     Additional Muscle Activation Details  TA contraction: able and painfree  Posterior pelvic tilt: able and painfree  Abdominal curl up: moderate difficulty and pain  Bridge: unable today and pain  4-/5 core strength    Tests     Lumbar     Left   Positive passive SLR and slump test.     Right   Positive passive SLR.   Negative slump test.     General Comments:      Hip Comments   R hip PROM screen : moderate hamstrings, moderate to severe hip flexion restriction  L hip PROM screen : moderate hamstrings, mild ER, moderate flexion restriction           Precautions: skin cancer, Deep brain stimulator, h/o DVT EPOC 3/22/24    Manuals 1/22            p-a mobs lumbar spine 3-4                          STM paraspinals                          Neuro Re-Ed                          TA bracing             PPT             Bridge with brace                          Ther Ex             Press ups 2x10                         Pt edu on plan of care, pathology, home program 8'            LTR              Hamstring stretch             Piriformis stretch                                       Ther Activity                                       Gait Training                                       Modalities             [facility] end

## 2024-01-24 ENCOUNTER — OFFICE VISIT (OUTPATIENT)
Dept: PHYSICAL THERAPY | Facility: CLINIC | Age: 69
End: 2024-01-24
Payer: MEDICARE

## 2024-01-24 DIAGNOSIS — G89.29 CHRONIC MIDLINE LOW BACK PAIN WITHOUT SCIATICA: Primary | ICD-10-CM

## 2024-01-24 DIAGNOSIS — M54.50 CHRONIC MIDLINE LOW BACK PAIN WITHOUT SCIATICA: Primary | ICD-10-CM

## 2024-01-24 PROCEDURE — 97140 MANUAL THERAPY 1/> REGIONS: CPT | Performed by: PHYSICAL THERAPIST

## 2024-01-24 PROCEDURE — 97110 THERAPEUTIC EXERCISES: CPT | Performed by: PHYSICAL THERAPIST

## 2024-01-24 PROCEDURE — 97112 NEUROMUSCULAR REEDUCATION: CPT | Performed by: PHYSICAL THERAPIST

## 2024-01-24 PROCEDURE — 97010 HOT OR COLD PACKS THERAPY: CPT | Performed by: PHYSICAL THERAPIST

## 2024-01-24 NOTE — PROGRESS NOTES
"Daily Note     Today's date: 2024  Patient name: Doug Vargas  : 1955  MRN: [id number]  Referring provider: Lexi Moe*  Dx:   Encounter Diagnosis     ICD-10-CM    1. Chronic midline low back pain without sciatica  M54.50     G89.29                      Subjective: patient reports that he has been doing press ups 3x per day and thinks he overdid. He tried doing them on the steps and they felt fine while doing them, but notes increased pain now up to 9/10.      Objective: See treatment diary below      Assessment: Tolerated treatment fair. Began session with moist hot pack in prone to reduce pain and irritability. Patient was able to perform press ups on table without pain but recommended patient temporarily hold off on them for now. Patient noted reduction in pain to 6/10 by end of session. He tolerated hip and lumbar stretches in supine and sitting without provocation of pain. Patient would benefit from continued skilled PT per plan of care to address impairments and improve function.       Plan: Continue per plan of care.     Bjorn Wilson SPT assisted in administering treatment under the supervision of Florentin Hanson PT, DPT.       Precautions: skin cancer, Deep brain stimulator, h/o DVT EPOC 3/22/24    Manuals            p-a mobs lumbar spine 3-4                          STM paraspinals  ANDREI                        Neuro Re-Ed                          TA bracing  10x5\"           PPT  10x5\"           Bridge with brace             Sciatic nerve glide, supine  15 ea           Ther Ex             Press ups 2x10 2x10                        Pt edu on plan of care, pathology, home program 8'            LTR  5x10\"           Hamstring stretch             Piriformis stretch  4x20\"           Figure 4 stretch  4x20\"           Seated trunk rotation  5x10\"           Ther Activity                                       Gait Training                                       Modalities           "   MHP end  Start 8' with PPU

## 2024-01-29 ENCOUNTER — OFFICE VISIT (OUTPATIENT)
Dept: PHYSICAL THERAPY | Facility: CLINIC | Age: 69
End: 2024-01-29
Payer: MEDICARE

## 2024-01-29 DIAGNOSIS — G89.29 CHRONIC MIDLINE LOW BACK PAIN WITHOUT SCIATICA: Primary | ICD-10-CM

## 2024-01-29 DIAGNOSIS — M25.511 ACUTE PAIN OF RIGHT SHOULDER: ICD-10-CM

## 2024-01-29 DIAGNOSIS — I82.461 ACUTE DEEP VEIN THROMBOSIS (DVT) OF CALF MUSCLE VEIN OF RIGHT LOWER EXTREMITY (HCC): ICD-10-CM

## 2024-01-29 DIAGNOSIS — M54.12 CERVICAL RADICULOPATHY: ICD-10-CM

## 2024-01-29 DIAGNOSIS — R26.9 NEUROLOGIC GAIT DYSFUNCTION: ICD-10-CM

## 2024-01-29 DIAGNOSIS — M54.50 CHRONIC MIDLINE LOW BACK PAIN WITHOUT SCIATICA: Primary | ICD-10-CM

## 2024-01-29 PROCEDURE — 97140 MANUAL THERAPY 1/> REGIONS: CPT | Performed by: PHYSICAL THERAPIST

## 2024-01-29 PROCEDURE — 97010 HOT OR COLD PACKS THERAPY: CPT | Performed by: PHYSICAL THERAPIST

## 2024-01-29 PROCEDURE — 97112 NEUROMUSCULAR REEDUCATION: CPT | Performed by: PHYSICAL THERAPIST

## 2024-01-29 PROCEDURE — 97110 THERAPEUTIC EXERCISES: CPT | Performed by: PHYSICAL THERAPIST

## 2024-01-29 NOTE — PROGRESS NOTES
"Daily Note     Today's date: 2024  Patient name: Doug Vargas  : 1955  MRN: [id number]  Referring provider: Lexi Moe*  Dx:   Encounter Diagnosis     ICD-10-CM    1. Chronic midline low back pain without sciatica  M54.50     G89.29       2. Cervical radiculopathy  M54.12       3. Acute pain of right shoulder  M25.511       4. Acute deep vein thrombosis (DVT) of calf muscle vein of right lower extremity (HCC)  I82.461       5. Neurologic gait dysfunction  R26.9                      Subjective: patient reports that his pain level is at a 6/10 at the center of his lower mid back. Patient reports that he has the past pain with prolonged slouching and heavy lifting.      Objective: See treatment diary below      Assessment: Tolerated treatment well. Patient stated that towards the end of the session he was feeling much better because he wasn't as sore or had as much pain at the medial aspect of his lower back. Added two core strengthening exercises to the program, being palof press and shoulder extension with core stabilization. Patient notes that he feels a significant stretch in his lumbar spine with the seated trunk rotation stretch. Patient would benefit from continuing skilled physical therapy to increase core stabilization strength and lumbar A/PROM to decrease the amount of pain while performing ADLs at his cabin.      Plan: Continue per plan of care.     Bjorn Wilson SPT assisted in administering treatment under the supervision of Florentin Hanson PT, DPT.       Precautions: skin cancer, Deep brain stimulator, h/o DVT EPOC 3/22/24    Manuals           p-a mobs lumbar spine 3-4                          STM paraspinals  ANDREI ANDREI                       Neuro Re-Ed                          TA bracing  10x5\"           PPT  10x5\" 2x10x5\"          Bridge with brace             Sciatic nerve glide, supine  15 ea           Core brace and shoulder extension   2x10  BTB          Paloff " "press   2x10  BTB          Ther Ex             Press ups 2x10 2x10 3x10  2 sets during heat                       Pt edu on plan of care, pathology, home program 8'            LTR  5x10\" 10x5\"          Hamstring stretch             Piriformis stretch  4x20\" 4x20\"          Figure 4 stretch  4x20\" 4x20\"          Seated trunk rotation  5x10\" 5x10\"          Ther Activity                                       Gait Training                                       Modalities             MHP end  Start 8' with PPU Start 8' with PPU                                   "

## 2024-01-31 ENCOUNTER — OFFICE VISIT (OUTPATIENT)
Dept: PHYSICAL THERAPY | Facility: CLINIC | Age: 69
End: 2024-01-31
Payer: MEDICARE

## 2024-01-31 DIAGNOSIS — M54.50 CHRONIC MIDLINE LOW BACK PAIN WITHOUT SCIATICA: Primary | ICD-10-CM

## 2024-01-31 DIAGNOSIS — G89.29 CHRONIC MIDLINE LOW BACK PAIN WITHOUT SCIATICA: Primary | ICD-10-CM

## 2024-01-31 PROCEDURE — 97110 THERAPEUTIC EXERCISES: CPT | Performed by: PHYSICAL THERAPIST

## 2024-01-31 PROCEDURE — 97140 MANUAL THERAPY 1/> REGIONS: CPT | Performed by: PHYSICAL THERAPIST

## 2024-01-31 PROCEDURE — 97112 NEUROMUSCULAR REEDUCATION: CPT | Performed by: PHYSICAL THERAPIST

## 2024-01-31 NOTE — PROGRESS NOTES
"Daily Note     Today's date: 2024  Patient name: Doug Vargas  : 1955  MRN: [id number]  Referring provider: Lexi Moe*  Dx:   Encounter Diagnosis     ICD-10-CM    1. Chronic midline low back pain without sciatica  M54.50     G89.29                      Subjective: patient reports only mild soreness following last session and no soreness currently.       Objective: See treatment diary below      Assessment: Tolerated treatment well. Patient had mild low back pain today during press ups but it quickly subsided. Progressed core strengthening with diagonal chops. Patient would benefit from continued skilled PT per plan of care to address impairments and improve function.       Plan: Continue per plan of care.          Precautions: skin cancer, Deep brain stimulator, h/o DVT EPOC 3/22/24    Manuals          p-a mobs lumbar spine 3-4                          STM paraspinals  ANDREI ANDREI ANDREI                      Neuro Re-Ed                          TA bracing  10x5\"           PPT  10x5\" 2x10x5\" 20x         Bridge with brace    10x         Sciatic nerve glide, supine  15 ea           Core brace and shoulder extension   2x10  BTB 2x10 btb         Paloff press   2x10  BTB 2x10 btb         Ther Ex             Press ups 2x10 2x10 3x10  2 sets during heat 2x10 MHP                      Pt edu on plan of care, pathology, home program 8'            LTR  5x10\" 10x5\" 10x5\"         Hamstring stretch             Piriformis stretch  4x20\" 4x20\" 3x30\"         Figure 4 stretch  4x20\" 4x20\" 3x30\"         Seated trunk rotation  5x10\" 5x10\" 5x10\"         Ther Activity                                       Gait Training                                       Modalities             MHP end  Start 8' with PPU Start 8' with PPU Start 8' with PPU                                  "

## 2024-02-05 ENCOUNTER — APPOINTMENT (OUTPATIENT)
Dept: PHYSICAL THERAPY | Facility: CLINIC | Age: 69
End: 2024-02-05
Payer: MEDICARE

## 2024-02-07 ENCOUNTER — APPOINTMENT (OUTPATIENT)
Dept: PHYSICAL THERAPY | Facility: CLINIC | Age: 69
End: 2024-02-07
Payer: MEDICARE

## 2024-02-12 ENCOUNTER — APPOINTMENT (OUTPATIENT)
Dept: PHYSICAL THERAPY | Facility: CLINIC | Age: 69
End: 2024-02-12
Payer: MEDICARE

## 2024-02-14 ENCOUNTER — APPOINTMENT (OUTPATIENT)
Dept: PHYSICAL THERAPY | Facility: CLINIC | Age: 69
End: 2024-02-14
Payer: MEDICARE

## 2024-02-19 ENCOUNTER — APPOINTMENT (OUTPATIENT)
Dept: PHYSICAL THERAPY | Facility: CLINIC | Age: 69
End: 2024-02-19
Payer: MEDICARE

## 2024-02-21 ENCOUNTER — OFFICE VISIT (OUTPATIENT)
Dept: PHYSICAL THERAPY | Facility: CLINIC | Age: 69
End: 2024-02-21
Payer: MEDICARE

## 2024-02-21 DIAGNOSIS — G89.29 CHRONIC MIDLINE LOW BACK PAIN WITHOUT SCIATICA: Primary | ICD-10-CM

## 2024-02-21 DIAGNOSIS — M54.50 CHRONIC MIDLINE LOW BACK PAIN WITHOUT SCIATICA: Primary | ICD-10-CM

## 2024-02-21 PROCEDURE — 97112 NEUROMUSCULAR REEDUCATION: CPT | Performed by: PHYSICAL THERAPIST

## 2024-02-21 PROCEDURE — 97140 MANUAL THERAPY 1/> REGIONS: CPT | Performed by: PHYSICAL THERAPIST

## 2024-02-21 PROCEDURE — 97110 THERAPEUTIC EXERCISES: CPT | Performed by: PHYSICAL THERAPIST

## 2024-02-21 NOTE — PROGRESS NOTES
"Daily Note     Today's date: 2024  Patient name: Doug Vargas  : 1955  MRN: [id number]  Referring provider: Lexi Moe*  Dx:   Encounter Diagnosis     ICD-10-CM    1. Chronic midline low back pain without sciatica  M54.50     G89.29                      Subjective: patient returns to therapy 2 weeks since last session. Patient reports that he still gets low back soreness, but he feels the therapy helps.       Objective: See treatment diary below      Assessment: Tolerated treatment well. Patient still presented with soft tissue tenderness along b/l paraspinals but this improved following several minutes of STM/TPR. Progressed core stabilization with addition of T chops and pull downs with marching. Patient was also able to increase reps for bridges without provocation of pain. Patient would benefit from continued skilled PT per plan of care to address impairments and improve function.       Plan: Continue per plan of care.  Progress Report NV         Precautions: skin cancer, Deep brain stimulator, h/o DVT EPOC 3/22/24    Manuals  SD       p-a mobs lumbar spine 3-4                          STM paraspinals  ANDREI ANDREI ANDREI ANDREI ANDREI                    Neuro Re-Ed             Tb chops     15 ea gtb        TA bracing  10x5\"           PPT  10x5\" 2x10x5\" 20x 20x        Bridge with brace    10x 20x        Sciatic nerve glide, supine  15 ea           Core brace and shoulder extension   2x10  BTB 2x10 btb With march 20 ea gtb        Paloff press   2x10  BTB 2x10 btb 2x10 gtb        Ther Ex             Press ups 2x10 2x10 3x10  2 sets during heat 2x10 MHP                      Pt edu on plan of care, pathology, home program 8'            LTR  5x10\" 10x5\" 10x5\" 5x10\"        Hamstring stretch             Piriformis stretch  4x20\" 4x20\" 3x30\" 3x30\"        Figure 4 stretch  4x20\" 4x20\" 3x30\"         Seated trunk rotation  5x10\" 5x10\" 5x10\" 5x10\"        Ther Activity                        "                Gait Training                                       Modalities             MHP end  Start 8' with PPU Start 8' with PPU Start 8' with PPU

## 2024-02-26 ENCOUNTER — OFFICE VISIT (OUTPATIENT)
Dept: PHYSICAL THERAPY | Facility: CLINIC | Age: 69
End: 2024-02-26
Payer: MEDICARE

## 2024-02-26 DIAGNOSIS — G89.29 CHRONIC MIDLINE LOW BACK PAIN WITHOUT SCIATICA: Primary | ICD-10-CM

## 2024-02-26 DIAGNOSIS — M54.50 CHRONIC MIDLINE LOW BACK PAIN WITHOUT SCIATICA: Primary | ICD-10-CM

## 2024-02-26 PROCEDURE — 97112 NEUROMUSCULAR REEDUCATION: CPT | Performed by: PHYSICAL THERAPIST

## 2024-02-26 PROCEDURE — 97110 THERAPEUTIC EXERCISES: CPT | Performed by: PHYSICAL THERAPIST

## 2024-02-26 PROCEDURE — 97140 MANUAL THERAPY 1/> REGIONS: CPT | Performed by: PHYSICAL THERAPIST

## 2024-02-26 NOTE — PROGRESS NOTES
"Progress Report    Today's date: 2024  Patient name: Doug Vargas  : 1955  MRN: [id number]  Referring provider: Lexi Moe*  Dx:   Encounter Diagnosis     ICD-10-CM    1. Chronic midline low back pain without sciatica  M54.50     G89.29                      Subjective: patient reports 7-8/10 pain yesterday but he was shoveling a lot of mulch. He notes 5/10 pain currently.      Objective: See treatment diary below    Lumbar AROM %:  Flexion 75%   Extension 50%   R side bending 50% pain  L side bending 50% pain  R rotation 75%   L rotation 75%     Mild to moderate tenderness along b/l paraspinals.    Assessment: Patient has been treated for 6 sessions since evaluation 1 month ago. Treatment has consisted of manual therapy to improve joint mobility and soft tissue restrictions, therapeutic exercises and activities to improve core strength and flexibility, neuromuscular reeducation to improve core stabilization, and patient education on home program. Objectively, patient demonstrates improvement in lumbar AROM in certain directions, but regression in others. Overall less painful lumbar AROM seen today but still limited mobility. Functionally, patient notes less pain in response to treatment at times, but still has increased pain depending on activity level at home. Patient requires continued skilled PT per plan of care to address impairments and improve function.          Plan: Continue per plan of care.  Progressed lumbar mobility and core strength as tolerated         Precautions: skin cancer, Deep brain stimulator, h/o DVT EPOC 3/22/24    Manuals        p-a mobs lumbar spine 3-4      ANDREI                    STM paraspinals  ANDREI ANDREI ANDREI ANDREI ANDREI                    Neuro Re-Ed             Tb chops     15 ea gtb 15 ea bltb       TA bracing  10x5\"           PPT  10x5\" 2x10x5\" 20x 20x        Bridge with brace    10x 20x        Sciatic nerve glide, supine  15 ea         " "  Core brace and shoulder extension   2x10  BTB 2x10 btb With march 20 ea gtb W/ march 20 ea bltb       Paloff press   2x10  BTB 2x10 btb 2x10 gtb 2x10 btb       Ther Ex             Press ups 2x10 2x10 3x10  2 sets during heat 2x10 MHP  1x10       sktc      3x30\"       Lumbar ext in standing      3x10       Pt edu on plan of care, pathology, home program 8'            LTR  5x10\" 10x5\" 10x5\" 5x10\" 5x10\"       Hamstring stretch             Piriformis stretch  4x20\" 4x20\" 3x30\" 3x30\"        Figure 4 stretch  4x20\" 4x20\" 3x30\"         Seated trunk rotation  5x10\" 5x10\" 5x10\" 5x10\" 5x10\"       Ther Activity                                       Gait Training                                       Modalities             MHP end  Start 8' with PPU Start 8' with PPU Start 8' with PPU                                  "

## 2024-02-28 ENCOUNTER — OFFICE VISIT (OUTPATIENT)
Dept: PHYSICAL THERAPY | Facility: CLINIC | Age: 69
End: 2024-02-28
Payer: MEDICARE

## 2024-02-28 DIAGNOSIS — G89.29 CHRONIC MIDLINE LOW BACK PAIN WITHOUT SCIATICA: Primary | ICD-10-CM

## 2024-02-28 DIAGNOSIS — G25.0 BENIGN FAMILIAL TREMOR: ICD-10-CM

## 2024-02-28 DIAGNOSIS — M54.50 CHRONIC MIDLINE LOW BACK PAIN WITHOUT SCIATICA: Primary | ICD-10-CM

## 2024-02-28 PROCEDURE — 97110 THERAPEUTIC EXERCISES: CPT | Performed by: PHYSICAL THERAPIST

## 2024-02-28 PROCEDURE — 97140 MANUAL THERAPY 1/> REGIONS: CPT | Performed by: PHYSICAL THERAPIST

## 2024-02-28 NOTE — PROGRESS NOTES
"Daily Note    Today's date: 2024  Patient name: Doug Vargas  : 1955  MRN: [id number]  Referring provider: Lexi Moe*  Dx:   Encounter Diagnosis     ICD-10-CM    1. Chronic midline low back pain without sciatica  M54.50     G89.29           Subjective: patient reports that he has been experiencing significant amounts of pain across the entire lumbar spine since the last session. He states that he is currently at a 8/10 pain at the same area. Patient believes that the onset of the pain could've been from doing the SKTC exercise.         Objective: See treatment diary below      Assessment: Modified the session today because of patients increased pain across the lumbar spine. Patient had no significant change in symptoms following press ups or press ups with exhale (force progression). Patient noticed more tenderness and hypomobility of the lumbar spine when laying on his right side during neutral gapping. Patient had a positive reaction to lumbar neutral gapping mobilizations with less pain while standing upright. Patient was still experiencing significant increases in pain when doing lumbar extension/flexion. Patient would benefit from continuing skilled physical therapy to increase lumbar AROM and decrease pain across the lumbar spine to be able to walk without pain.     Plan: Continue per plan of care.  Progressed lumbar mobility and core strength as tolerated         Precautions: skin cancer, Deep brain stimulator, h/o DVT EPOC 3/22/24    Manuals       p-a mobs lumbar spine 3-4      ANDREI       Neutral Gapping mobilization gr 3-4       ANDREI      STM paraspinals  ANDREI ANDREI ANDREI ANDREI ANDREI MG                   Neuro Re-Ed             Tb chops     15 ea gtb 15 ea bltb       TA bracing  10x5\"           PPT  10x5\" 2x10x5\" 20x 20x        Bridge with brace    10x 20x        Sciatic nerve glide, supine  15 ea           Core brace and shoulder extension   2x10  BTB 2x10 btb " "With march 20 ea gtb W/ march 20 ea bltb       Paloff press   2x10  BTB 2x10 btb 2x10 gtb 2x10 btb       Ther Ex             Press ups 2x10 2x10 3x10  2 sets during heat 2x10 MHP  1x10 3x10,  Then 1x10 with exhale      sktc      3x30\"       Lumbar ext in standing      3x10       Pt edu on plan of care, pathology, home program 8'            LTR  5x10\" 10x5\" 10x5\" 5x10\" 5x10\"       Hamstring stretch             Piriformis stretch  4x20\" 4x20\" 3x30\" 3x30\"        Figure 4 stretch  4x20\" 4x20\" 3x30\"         Seated trunk rotation  5x10\" 5x10\" 5x10\" 5x10\" 5x10\" 5x10\"      Ther Activity                                       Gait Training                                       Modalities             MHP end  Start 8' with PPU Start 8' with PPU Start 8' with PPU   Start 10' with PPU and heat at the end                               "

## 2024-03-01 RX ORDER — DIAZEPAM 10 MG/1
10 TABLET ORAL DAILY
Qty: 90 TABLET | Refills: 0 | Status: SHIPPED | OUTPATIENT
Start: 2024-03-01

## 2024-03-05 ENCOUNTER — APPOINTMENT (OUTPATIENT)
Dept: PHYSICAL THERAPY | Facility: CLINIC | Age: 69
End: 2024-03-05
Payer: MEDICARE

## 2024-03-05 ENCOUNTER — OFFICE VISIT (OUTPATIENT)
Dept: PHYSICAL THERAPY | Facility: CLINIC | Age: 69
End: 2024-03-05
Payer: MEDICARE

## 2024-03-05 DIAGNOSIS — G89.29 CHRONIC MIDLINE LOW BACK PAIN WITHOUT SCIATICA: Primary | ICD-10-CM

## 2024-03-05 DIAGNOSIS — M54.50 CHRONIC MIDLINE LOW BACK PAIN WITHOUT SCIATICA: Primary | ICD-10-CM

## 2024-03-05 PROCEDURE — 97112 NEUROMUSCULAR REEDUCATION: CPT | Performed by: PHYSICAL THERAPIST

## 2024-03-05 PROCEDURE — 97140 MANUAL THERAPY 1/> REGIONS: CPT | Performed by: PHYSICAL THERAPIST

## 2024-03-05 PROCEDURE — 97110 THERAPEUTIC EXERCISES: CPT | Performed by: PHYSICAL THERAPIST

## 2024-03-05 NOTE — PROGRESS NOTES
"Daily Note    Today's date: 3/5/2024  Patient name: Doug Vargas  : 1955  MRN: [id number]  Referring provider: Lexi Moe*  Dx:   Encounter Diagnosis     ICD-10-CM    1. Chronic midline low back pain without sciatica  M54.50     G89.29             Subjective: patient reports that he is feeling the same as he did last session. He is experiencing pain of 5-6/10 in the center of his lower back. There is nothing that he can pinpoint that the pain may be due to.      Objective: See treatment diary below      Assessment: Tolerated treatment well. Doug responded to the press ups/heat with a decrease of pain from the 5-6/10 to a 4/10. Dogu also responded well to exercises that required core stabilization with noting a decrease from a 4/10 to a 2/10. He tolerated the additional exercises/progressions to increase core stabilization and decrease lumbar spine pain. Patient received and was educated on his home program with any questions he had being answered to his satisfaction. Patient would benefit from continuing skilled physical therapy to increase AROM of the lumbar spine, increase motor unit recruitment, increase core stabilization, and to decrease the pain of the lumbar spine to be able to stand for a prolonged period of time without pain.     Treatment was performed by Bjorn Wilson (SPT) under the supervision of Florentin Hanson (DPT).    Plan: Continue per plan of care.  Progressed lumbar mobility and core strength as tolerated         Precautions: skin cancer, Deep brain stimulator, h/o DVT EPOC 3/22/24    Manuals 1/22 1/24 1/29 1/31 2/21 2/26 2/28 3/5     p-a mobs lumbar spine 3-4      ANDREI  MG     Neutral Gapping mobilization gr 3-4       ANDREI      STM paraspinals  ANDREI ANDREI ANDREI ANDREI ANDREI MG MG                  Neuro Re-Ed             Tb chops     15 ea gtb 15 ea bltb  15 ea BTB     TA bracing  10x5\"           PPT  10x5\" 2x10x5\" 20x 20x        Bridge with brace    10x 20x        Sciatic nerve glide, " "supine  15 ea           Core brace and shoulder extension   2x10  BTB 2x10 btb With march 20 ea gtb W/ march 20 ea bltb  1x10, 2x30\" with a march Paloff press   2x10  BTB 2x10 btb 2x10 gtb 2x10 btb  2x10 BTB 2\" pause     Standing bird dog leaning on the counter        5x ea     Ther Ex             Press ups 2x10 2x10 3x10  2 sets during heat 2x10 MHP  1x10 3x10,  Then 1x10 with exhale 4x10     sktc      3x30\"       Lumbar ext in standing      3x10       Pt edu on plan of care, pathology, home program 8'            LTR  5x10\" 10x5\" 10x5\" 5x10\" 5x10\"       Hamstring stretch             Piriformis stretch  4x20\" 4x20\" 3x30\" 3x30\"        Figure 4 stretch  4x20\" 4x20\" 3x30\"         Seated trunk rotation  5x10\" 5x10\" 5x10\" 5x10\" 5x10\" 5x10\"      Standing hip abduction        1x10     Ther Activity                                       Gait Training                                       Modalities             MHP end  Start 8' with PPU Start 8' with PPU Start 8' with PPU   Start 10' with PPU and heat at the end Start 10' with PPU and heat at the end                              "

## 2024-03-07 ENCOUNTER — APPOINTMENT (OUTPATIENT)
Dept: PHYSICAL THERAPY | Facility: CLINIC | Age: 69
End: 2024-03-07
Payer: MEDICARE

## 2024-03-12 ENCOUNTER — OFFICE VISIT (OUTPATIENT)
Dept: PHYSICAL THERAPY | Facility: CLINIC | Age: 69
End: 2024-03-12
Payer: MEDICARE

## 2024-03-12 ENCOUNTER — APPOINTMENT (OUTPATIENT)
Dept: PHYSICAL THERAPY | Facility: CLINIC | Age: 69
End: 2024-03-12
Payer: MEDICARE

## 2024-03-12 DIAGNOSIS — G89.29 CHRONIC MIDLINE LOW BACK PAIN WITHOUT SCIATICA: Primary | ICD-10-CM

## 2024-03-12 DIAGNOSIS — M54.50 CHRONIC MIDLINE LOW BACK PAIN WITHOUT SCIATICA: Primary | ICD-10-CM

## 2024-03-12 PROCEDURE — 97112 NEUROMUSCULAR REEDUCATION: CPT | Performed by: PHYSICAL THERAPIST

## 2024-03-12 PROCEDURE — 97140 MANUAL THERAPY 1/> REGIONS: CPT | Performed by: PHYSICAL THERAPIST

## 2024-03-12 PROCEDURE — 97110 THERAPEUTIC EXERCISES: CPT | Performed by: PHYSICAL THERAPIST

## 2024-03-12 NOTE — PROGRESS NOTES
"Daily Note    Today's date: 3/12/2024  Patient name: Doug Vargas  : 1955  MRN: [id number]  Referring provider: Lexi Moe*  Dx:   Encounter Diagnosis     ICD-10-CM    1. Chronic midline low back pain without sciatica  M54.50     G89.29             Subjective: patient reports that his back has been feeling a lot better since last session and doing the core strengthening exercises at home.      Objective: See treatment diary below      Assessment: Tolerated treatment well. Patient presented with significantly less soft tissue restrictions and tenderness along lumbar paraspinals and QL. He continues to have pain and difficulty with transferring from prone/supine position to sitting, but less compared to start of care. No provocation of pain with core stabilization exercises but cuing required for correct technique with chops and marches. Patient would benefit from continuing skilled physical therapy to increase AROM of the lumbar spine, increase motor unit recruitment, increase core stabilization, and to decrease the pain of the lumbar spine to be able to stand for a prolonged period of time without pain.         Plan: Continue per plan of care.  Progressed lumbar mobility and core strength as tolerated         Precautions: skin cancer, Deep brain stimulator, h/o DVT EPOC 3/22/24    Manuals 1/22 1/24 1/29 1/31 2/21 2/26 2/28 3/5 3/12    p-a mobs lumbar spine 3-4      ANDREI  MG     Neutral Gapping mobilization gr 3-4       ANDREI      STM paraspinals  ANDREI ANDREI ANDREI ANDREI ANDREI MG MG ANDREI                 Neuro Re-Ed             Tb chops     15 ea gtb 15 ea bltb  15 ea BTB 2x10 bltb                 Brace with SLR ext, prone         5 ea    Bridge with brace    10x 20x        Sciatic nerve glide, supine  15 ea           Core brace and shoulder extension   2x10  BTB 2x10 btb With  ea gtb W/  ea bltb  1x10, 2x30\" with a  press   2x10  BTB 2x10 btb 2x10 gtb 2x10 btb  2x10 BTB 2\" pause 2x10 " "btb    Standing bird dog leaning on the counter        5x ea 10 ea    Ther Ex             Press ups 2x10 2x10 3x10  2 sets during heat 2x10 MHP  1x10 3x10,  Then 1x10 with exhale 4x10 x10                              Pt edu on plan of care, pathology, home program 8'            LTR  5x10\" 10x5\" 10x5\" 5x10\" 5x10\"       Hamstring stretch             Piriformis stretch  4x20\" 4x20\" 3x30\" 3x30\"        Figure 4 stretch  4x20\" 4x20\" 3x30\"         Seated trunk rotation  5x10\" 5x10\" 5x10\" 5x10\" 5x10\" 5x10\"  5x10\"    Standing hip abduction        1x10 2x10    Ther Activity             [place] carry         1 lap ea 10#                 Gait Training                                       Modalities             MHP end  Start 8' with PPU Start 8' with PPU Start 8' with PPU   Start 10' with PPU and heat at the end Start 10' with PPU and heat at the end 5' during seated rotation stretch                             "

## 2024-03-14 ENCOUNTER — APPOINTMENT (OUTPATIENT)
Dept: PHYSICAL THERAPY | Facility: CLINIC | Age: 69
End: 2024-03-14
Payer: MEDICARE

## 2024-03-19 ENCOUNTER — OFFICE VISIT (OUTPATIENT)
Dept: PHYSICAL THERAPY | Facility: CLINIC | Age: 69
End: 2024-03-19
Payer: MEDICARE

## 2024-03-19 DIAGNOSIS — G89.29 CHRONIC MIDLINE LOW BACK PAIN WITHOUT SCIATICA: Primary | ICD-10-CM

## 2024-03-19 DIAGNOSIS — M54.50 CHRONIC MIDLINE LOW BACK PAIN WITHOUT SCIATICA: Primary | ICD-10-CM

## 2024-03-19 PROCEDURE — 97112 NEUROMUSCULAR REEDUCATION: CPT | Performed by: PHYSICAL THERAPIST

## 2024-03-19 PROCEDURE — 97530 THERAPEUTIC ACTIVITIES: CPT | Performed by: PHYSICAL THERAPIST

## 2024-03-19 PROCEDURE — 97140 MANUAL THERAPY 1/> REGIONS: CPT | Performed by: PHYSICAL THERAPIST

## 2024-03-19 NOTE — PROGRESS NOTES
"Daily Note    Today's date: 3/19/2024  Patient name: Doug Vargas  : 1955  MRN: [id number]  Referring provider: Lexi Moe*  Dx:   Encounter Diagnosis     ICD-10-CM    1. Chronic midline low back pain without sciatica  M54.50     G89.29             Subjective: patient reports that his pain at its worst has only been 5/10, but most of the time is is minimal. Overall he reports significant improvement in his low back pain since starting PT.      Objective: See treatment diary below      Assessment: Patient tolerated increased tissue depth and pressure with STM to paraspinals. Instructed patient in proper lifting technique utilizing both hip hinge and squat lift to reduce strain to lumbar spine with household chores. Patient required cuing to maintain proper lumbar lordosis during lifting practice. No provocation of low back pain with core stabilization exercises. Patient would benefit from continuing skilled physical therapy to increase AROM of the lumbar spine, increase motor unit recruitment, increase core stabilization, and to decrease the pain of the lumbar spine to be able to stand for a prolonged period of time without pain.         Plan: Continue per plan of care.  Progressed lumbar mobility and core strength as tolerated. RE-EVAL nv.         Precautions: skin cancer, Deep brain stimulator, h/o DVT EPOC 3/22/24    Manuals Re-eval 1/24 1/29 1/31 2/21 2/26 2/28 3/5 3/12 3/19   p-a mobs lumbar spine 3-4      ANDREI  MG     Neutral Gapping mobilization gr 3-4       ANDREI      STM paraspinals  ANDREI ANDREI ANDREI ANDREI ANDREI MG MG ANDREI ANDREI                Neuro Re-Ed             Tb chops     15 ea gtb 15 ea bltb  15 ea BTB 2x10 bltb                 Brace with SLR ext, prone         5 ea 10 ea   Bridge with brace    10x 20x        Sciatic nerve glide, supine  15 ea           Core brace and shoulder extension   2x10  BTB 2x10 btb With  ea gtb W/  ea bltb  1x10, 2x30\" with a  press   2x10  BTB " "2x10 btb 2x10 gtb 2x10 btb  2x10 BTB 2\" pause 2x10 btb 2x10 bktb   Standing bird dog leaning on the counter        5x ea 10 ea 3x10 ea   Ther Ex             Press ups 2x10 2x10 3x10  2 sets during heat 2x10 MHP  1x10 3x10,  Then 1x10 with exhale 4x10 x10 x10                             Pt edu on plan of care, pathology, home program 8'            LTR  5x10\" 10x5\" 10x5\" 5x10\" 5x10\"       Hamstring stretch             Piriformis stretch  4x20\" 4x20\" 3x30\" 3x30\"        Figure 4 stretch  4x20\" 4x20\" 3x30\"         Seated trunk rotation  5x10\" 5x10\" 5x10\" 5x10\" 5x10\" 5x10\"  5x10\" 5x10\"   Standing hip abduction        1x10 2x10 3x10   Ther Activity             [place] carry         1 lap ea 10# 1 lap 15#   Hip hinge and squat lift practice          2x5 9# kb   Gait Training                                       Modalities             MHP end  Start 8' with PPU Start 8' with PPU Start 8' with PPU   Start 10' with PPU and heat at the end Start 10' with PPU and heat at the end 5' during seated rotation stretch                             "

## 2024-03-21 ENCOUNTER — APPOINTMENT (OUTPATIENT)
Dept: PHYSICAL THERAPY | Facility: CLINIC | Age: 69
End: 2024-03-21
Payer: MEDICARE

## 2024-03-28 ENCOUNTER — OFFICE VISIT (OUTPATIENT)
Dept: PHYSICAL THERAPY | Facility: CLINIC | Age: 69
End: 2024-03-28
Payer: MEDICARE

## 2024-03-28 DIAGNOSIS — M54.50 CHRONIC MIDLINE LOW BACK PAIN WITHOUT SCIATICA: Primary | ICD-10-CM

## 2024-03-28 DIAGNOSIS — G89.29 CHRONIC MIDLINE LOW BACK PAIN WITHOUT SCIATICA: Primary | ICD-10-CM

## 2024-03-28 PROCEDURE — 97112 NEUROMUSCULAR REEDUCATION: CPT | Performed by: PHYSICAL THERAPIST

## 2024-03-28 PROCEDURE — 97140 MANUAL THERAPY 1/> REGIONS: CPT | Performed by: PHYSICAL THERAPIST

## 2024-03-28 PROCEDURE — 97110 THERAPEUTIC EXERCISES: CPT | Performed by: PHYSICAL THERAPIST

## 2024-03-28 NOTE — PROGRESS NOTES
PT Re-Evaluation     Today's date: 3/28/2024  Patient name: Doug Vargas  : 1955  MRN: [id number]  Referring provider: Lexi Moe  Dx:   Encounter Diagnosis     ICD-10-CM    1. Chronic midline low back pain without sciatica  M54.50     G89.29                      Assessment  Assessment details: RE-EVALUATION 3/28/24: Patient has been seen for 11 visits over the past 2 months for low back pain. Treatment has consisted of manual therapy to improve ROM and joint mobility, therapeutic exercises to improve core strength and flexibility, neuromuscular reeducation to improve core stabilization, and patient education on home program and posture/proper body mechanics. From an objective standpoint, patient no longer has positive neural tension tests, has improved lumbar spine AROM, and improved core strength. He continues to have difficulty with proper body mechanics when bending/lifting, decreased hip mobility, and mild lumbar AROM restrictions. Functionally, patient notes less pain intensity and frequency. At this time, patient is appropriate for discharge to home program to maintain improvements and further progress upon impairments. Educated patient on updated home program and discharge recommendations; he verbalized understanding to all education.  Impairments: abnormal or restricted ROM, impaired physical strength, pain with function, poor posture  and poor body mechanics    Symptom irritability: lowUnderstanding of Dx/Px/POC: good   Prognosis: good    Goals  Short term goals:  Patient is independent in home program to support plan of care and improve function. - goal met  Patient demonstrates at least 75% lumbar extension ROM so he can stand with meal prep and less pain. - goal met  Patient demonstrates proper body mechanics with bending and lifting so he can perform ADLs with less pain. - partially met    Long term goals:  Patient demonstrates improvement in community participation with increase  "in FOTO score by 15%. - goal met  Patient can demonstrates 75% lumbar AROM in all directions so he can perform household chores without back pain. - goal met  Patient demonstrates 4+ core strength so he can lift heavy groceries and objects around home when performing household management without pain. - partially met  Patient has negative SLR and slump testing to correlate with low irritability of pain when transferring and getting out of bed. Goal met    Plan  Patient would benefit from: skilled physical therapy  Planned therapy interventions: abdominal trunk stabilization, activity modification, joint mobilization, manual therapy, massage, neuromuscular re-education, patient education, postural training, strengthening, stretching, body mechanics training, therapeutic exercise, flexibility, functional ROM exercises, home exercise program and IASTM  Frequency: 1x week  Duration in weeks: 1  Plan of Care beginning date: 3/28/2024  Plan of Care expiration date: 3/28/2024  Treatment plan discussed with: patient    Subjective Evaluation    History of Present Illness  Date of onset: 1/1/2014  Mechanism of injury: RE-EVALUATION 3/28/24: Patient reports overall significant improvement in pain since start of therapy. He still gets episodes of increased pain, however less frequent. Pain is usually in response to heavy lifting or increased activity, like recently lifting heavy chairs into his truck. He feels ready for discharge to home program at this time.    Patient has PT prescription for low back pain. Patient reports about 10 year history of low back pain, but worsening of pain over the past 2-3 years. Patient had imaging done years ago which showed some \"disc issues.\"    Symptoms: central low pain. Pain increases with prolonged standing (> 5 minutes), when working around the home when bending to the one side, lifting heavier objects with household chores (moving furniture) getting out of bed, getting up from a seated " position if not using his arms, with prolonged sitting if on a bench (no back support). Upon questioning, patient reports pain in R posterior thigh when getting out of bed but only 1x per month.    Patient denies sleep disturbance, bowel bladder dysfunction, saddle parasthesias, unexplained weight loss, h/o of cancer    PMH: skin cancer (yearly check ups), Deep brain stimulator 2000 for essential tremor (wire fronts from left pectoral area along left side of cervical spine and posterior cranium), h/o DVT  Patient Goals  Patient goals for therapy: decreased pain, increased motion, increased strength and independence with ADLs/IADLs    Pain  Current pain ratin  At best pain ratin  At worst pain ratin        Objective     Postural Observations  Seated posture: fair      Palpation   Left   No palpable tenderness to the erector spinae and quadratus lumborum.     Right   No palpable tenderness to the erector spinae and quadratus lumborum.     Neurological Testing     Sensation     Lumbar   Left   Intact: light touch    Right   Intact: light touch    Reflexes   Left   Patellar (L4): trace (1+)  Achilles (S1): normal (2+)    Right   Patellar (L4): trace (1+)  Achilles (S1): normal (2+)    Additional Neurological Details  Myotomes: WNL      Active Range of Motion     Additional Active Range of Motion Details  Lumbar AROM %:  Flexion 75%   Extension 75%   R side bending 75%   L side bending 75%   R rotation 100%  L rotation 100%    Joint Play     Hypomobile: L1, L2, L3, L4 and L5     Pain: L1, L2, L3, L4 and L5   L1 comments: improved following press ups  L2 comments: improved following press ups  L3 comments: improved following press ups  L4 comments: improved following press ups  L5 comments: improved following press ups    Muscle Activation     Additional Muscle Activation Details  TA contraction: able and painfree  Posterior pelvic tilt: able and painfree  Abdominal curl up: no difficulty or pain, decreased  "range of motion  Bridge: able but painful initially  4/5 core strength  Still moderate difficulty with table transfers    Tests     Lumbar     Left   Negative passive SLR and slump test.     Right   Negative passive SLR and slump test.     General Comments:      Hip Comments   R hip PROM screen : moderate hamstrings, moderate to severe hip flexion restriction  L hip PROM screen : mild hamstrings, mild ER, moderate flexion restriction                    Precautions: skin cancer, Deep brain stimulator, h/o DVT    Manuals 3/28 1/24 1/29 1/31 2/21 2/26 2/28 3/5 3/12 3/19         ANDREI  MG            ANDREI      STM paraspinals ANDREI ANDREI ANDREI ANDREI ANDREI ANDREI MG MG ANDREI ANDREI                Neuro Re-Ed             Tb chops     15 ea gtb 15 ea bltb  15 ea BTB 2x10 bltb                 Brace with SLR ext, prone 10 ea        5 ea 10 ea   Bridge with brace    10x 20x        Sciatic nerve glide, supine  15 ea           Core brace and shoulder extension 2x10  2x10  BTB 2x10 btb With march 20 ea gtb W/ march 20 ea bltb  1x10, 2x30\" with a march Paloff press 2x10 bktb  2x10  BTB 2x10 btb 2x10 gtb 2x10 btb  2x10 BTB 2\" pause 2x10 btb 2x10 bktb   Standing bird dog leaning on the counter 2x10 ea       5x ea 10 ea 3x10 ea   Ther Ex             Press ups 10x 2x10 3x10  2 sets during heat 2x10 MHP  1x10 3x10,  Then 1x10 with exhale 4x10 x10 x10                             Pt edu on plan of care, pathology, home program 8'            LTR  5x10\" 10x5\" 10x5\" 5x10\" 5x10\"       Hamstring stretch             Piriformis stretch Seated 3x30\" 4x20\" 4x20\" 3x30\" 3x30\"        Figure 4 stretch  4x20\" 4x20\" 3x30\"         Seated trunk rotation 5x10\" 5x10\" 5x10\" 5x10\" 5x10\" 5x10\" 5x10\"  5x10\" 5x10\"   Standing hip abduction 2x10       1x10 2x10 3x10   Ther Activity             [place] carry 1 lap 15#        1 lap ea 10# 1 lap 15#   Hip hinge and squat lift practice 5x         2x5 9# kb   Gait Training                                       Modalities             MHP end  " Start 8' with PPU Start 8' with PPU Start 8' with PPU   Start 10' with PPU and heat at the end Start 10' with PPU and heat at the end 5' during seated rotation stretch

## 2024-03-28 NOTE — LETTER
2024    Lexi Moe, DO  [address] [facility] [address]    Patient: Doug Vargas   YOB: 1955   Date of Visit: 3/28/2024     Encounter Diagnosis     ICD-10-CM    1. Chronic midline low back pain without sciatica  M54.50     G89.29           Dear Dr. Moe:    Thank you for your recent referral of Doug Vargas. Please review the attached evaluation summary from Doug's recent visit.     Please verify that you agree with the plan of care by signing the attached order.     If you have any questions or concerns, please do not hesitate to call.     I sincerely appreciate the opportunity to share in the care of one of your patients and hope to have another opportunity to work with you in the near future.       Sincerely,    DEANN Hanson, PT      Referring Provider:      I certify that I have read the below Plan of Care and certify the need for these services furnished under this plan of treatment while under my care.                    Lexi Moe,   [address] [facility] [address]  Via Fax: [phone]          PT Re-Evaluation     Today's date: 3/28/2024  Patient name: Doug Vargas  : 1955  MRN: [id number]  Referring provider: Lexi Moe*  Dx:   Encounter Diagnosis     ICD-10-CM    1. Chronic midline low back pain without sciatica  M54.50     G89.29                      Assessment  Assessment details: RE-EVALUATION 3/28/24: Patient has been seen for 11 visits over the past 2 months for low back pain. Treatment has consisted of manual therapy to improve ROM and joint mobility, therapeutic exercises to improve core strength and flexibility, neuromuscular reeducation to improve core stabilization, and patient education on home program and posture/proper body mechanics. From an objective standpoint, patient no longer has positive neural tension tests, has improved lumbar spine AROM, and improved core strength. He continues to  have difficulty with proper body mechanics when bending/lifting, decreased hip mobility, and mild lumbar AROM restrictions. Functionally, patient notes less pain intensity and frequency. At this time, patient is appropriate for discharge to home program to maintain improvements and further progress upon impairments. Educated patient on updated home program and discharge recommendations; he verbalized understanding to all education.  Impairments: abnormal or restricted ROM, impaired physical strength, pain with function, poor posture  and poor body mechanics    Symptom irritability: lowUnderstanding of Dx/Px/POC: good   Prognosis: good    Goals  Short term goals:  Patient is independent in home program to support plan of care and improve function. - goal met  Patient demonstrates at least 75% lumbar extension ROM so he can stand with meal prep and less pain. - goal met  Patient demonstrates proper body mechanics with bending and lifting so he can perform ADLs with less pain. - partially met    Long term goals:  Patient demonstrates improvement in community participation with increase in FOTO score by 15%. - goal met  Patient can demonstrates 75% lumbar AROM in all directions so he can perform household chores without back pain. - goal met  Patient demonstrates 4+ core strength so he can lift heavy groceries and objects around home when performing household management without pain. - partially met  Patient has negative SLR and slump testing to correlate with low irritability of pain when transferring and getting out of bed. Goal met    Plan  Patient would benefit from: skilled physical therapy  Planned therapy interventions: abdominal trunk stabilization, activity modification, joint mobilization, manual therapy, massage, neuromuscular re-education, patient education, postural training, strengthening, stretching, body mechanics training, therapeutic exercise, flexibility, functional ROM exercises, home exercise  "program and IASTM  Frequency: 1x week  Duration in weeks: 1  Plan of Care beginning date: 3/28/2024  Plan of Care expiration date: 3/28/2024  Treatment plan discussed with: patient    Subjective Evaluation    History of Present Illness  Date of onset: 2014  Mechanism of injury: RE-EVALUATION 3/28/24: Patient reports overall significant improvement in pain since start of therapy. He still gets episodes of increased pain, however less frequent. Pain is usually in response to heavy lifting or increased activity, like recently lifting heavy chairs into his truck. He feels ready for discharge to home program at this time.    Patient has PT prescription for low back pain. Patient reports about 10 year history of low back pain, but worsening of pain over the past 2-3 years. Patient had imaging done years ago which showed some \"disc issues.\"    Symptoms: central low pain. Pain increases with prolonged standing (> 5 minutes), when working around the home when bending to the one side, lifting heavier objects with household chores (moving furniture) getting out of bed, getting up from a seated position if not using his arms, with prolonged sitting if on a bench (no back support). Upon questioning, patient reports pain in R posterior thigh when getting out of bed but only 1x per month.    Patient denies sleep disturbance, bowel bladder dysfunction, saddle parasthesias, unexplained weight loss, h/o of cancer    PMH: skin cancer (yearly check ups), Deep brain stimulator  for essential tremor (wire fronts from left pectoral area along left side of cervical spine and posterior cranium), h/o DVT  Patient Goals  Patient goals for therapy: decreased pain, increased motion, increased strength and independence with ADLs/IADLs    Pain  Current pain ratin  At best pain ratin  At worst pain ratin        Objective     Postural Observations  Seated posture: fair      Palpation   Left   No palpable tenderness to the " erector spinae and quadratus lumborum.     Right   No palpable tenderness to the erector spinae and quadratus lumborum.     Neurological Testing     Sensation     Lumbar   Left   Intact: light touch    Right   Intact: light touch    Reflexes   Left   Patellar (L4): trace (1+)  Achilles (S1): normal (2+)    Right   Patellar (L4): trace (1+)  Achilles (S1): normal (2+)    Additional Neurological Details  Myotomes: WNL      Active Range of Motion     Additional Active Range of Motion Details  Lumbar AROM %:  Flexion 75%   Extension 75%   R side bending 75%   L side bending 75%   R rotation 100%  L rotation 100%    Joint Play     Hypomobile: L1, L2, L3, L4 and L5     Pain: L1, L2, L3, L4 and L5   L1 comments: improved following press ups  L2 comments: improved following press ups  L3 comments: improved following press ups  L4 comments: improved following press ups  L5 comments: improved following press ups    Muscle Activation     Additional Muscle Activation Details  TA contraction: able and painfree  Posterior pelvic tilt: able and painfree  Abdominal curl up: no difficulty or pain, decreased range of motion  Bridge: able but painful initially  4/5 core strength  Still moderate difficulty with table transfers    Tests     Lumbar     Left   Negative passive SLR and slump test.     Right   Negative passive SLR and slump test.     General Comments:      Hip Comments   R hip PROM screen : moderate hamstrings, moderate to severe hip flexion restriction  L hip PROM screen : mild hamstrings, mild ER, moderate flexion restriction                    Precautions: skin cancer, Deep brain stimulator, h/o DVT    Manuals 3/28 1/24 1/29 1/31 2/21 2/26 2/28 3/5 3/12 3/19         ANDREI  MG            ANDREI      STM paraspinals ANDREI ANDREI ANDREI ANDREI ANDREI ANDREI MG MG ANDREI ANDREI                Neuro Re-Ed             Tb chops     15 ea gtb 15 ea bltb  15 ea BTB 2x10 bltb                 Brace with SLR ext, prone 10 ea        5 ea 10 ea   Bridge with brace    10x  "20x        Sciatic nerve glide, supine  15 ea           Core brace and shoulder extension 2x10  2x10  BTB 2x10 btb With march 20 ea gtb W/ march 20 ea bltb  1x10, 2x30\" with a march     Paloff press 2x10 bktb  2x10  BTB 2x10 btb 2x10 gtb 2x10 btb  2x10 BTB 2\" pause 2x10 btb 2x10 bktb   Standing bird dog leaning on the counter 2x10 ea       5x ea 10 ea 3x10 ea   Ther Ex             Press ups 10x 2x10 3x10  2 sets during heat 2x10 MHP  1x10 3x10,  Then 1x10 with exhale 4x10 x10 x10                             Pt edu on plan of care, pathology, home program 8'            LTR  5x10\" 10x5\" 10x5\" 5x10\" 5x10\"       Hamstring stretch             Piriformis stretch Seated 3x30\" 4x20\" 4x20\" 3x30\" 3x30\"        Figure 4 stretch  4x20\" 4x20\" 3x30\"         Seated trunk rotation 5x10\" 5x10\" 5x10\" 5x10\" 5x10\" 5x10\" 5x10\"  5x10\" 5x10\"   Standing hip abduction 2x10       1x10 2x10 3x10   Ther Activity             Old Stine carry 1 lap 15#        1 lap ea 10# 1 lap 15#   Hip hinge and squat lift practice 5x         2x5 9# kb   Gait Training                                       Modalities             MHP end  Start 8' with PPU Start 8' with PPU Start 8' with PPU   Start 10' with PPU and heat at the end Start 10' with PPU and heat at the end 5' during seated rotation stretch                                             "

## 2024-05-29 ENCOUNTER — TELEPHONE (OUTPATIENT)
Dept: NEUROLOGY | Facility: CLINIC | Age: 69
End: 2024-05-29

## 2024-05-29 NOTE — TELEPHONE ENCOUNTER
received vm from 5/29 at 1:30pm-Doug kelly, SENTHIL  [phone]  -------------------------------  Pt did not leave any further message, just his name

## 2024-05-30 DIAGNOSIS — G25.0 BENIGN FAMILIAL TREMOR: ICD-10-CM

## 2024-05-30 NOTE — TELEPHONE ENCOUNTER
Pt called to have a refill of his   diazepam (VALIUM) 10 mg tablet .    He would like it sent to:   [id number] - [place]haveri - [address]  [address], [place] [address]  Phone: [phone]  Fax: [phone]

## 2024-06-03 RX ORDER — DIAZEPAM 10 MG/1
10 TABLET ORAL DAILY
Qty: 90 TABLET | Refills: 0 | Status: SHIPPED | OUTPATIENT
Start: 2024-06-03

## 2024-08-29 DIAGNOSIS — G25.0 BENIGN FAMILIAL TREMOR: ICD-10-CM

## 2024-08-30 RX ORDER — DIAZEPAM 10 MG
10 TABLET ORAL DAILY
Qty: 90 TABLET | Refills: 0 | Status: SHIPPED | OUTPATIENT
Start: 2024-08-30

## 2024-09-10 PROBLEM — R97.20 ELEVATED PSA: Status: ACTIVE | Noted: 2024-09-10

## 2024-09-10 PROBLEM — Z98.890 HISTORY OF NEEDLE BIOPSY OF PROSTATE WITH NEGATIVE RESULT: Status: ACTIVE | Noted: 2024-09-10

## 2024-09-10 PROBLEM — Z79.899 MEDICATION MANAGEMENT: Status: ACTIVE | Noted: 2024-09-10

## 2024-09-10 NOTE — ASSESSMENT & PLAN NOTE
Medication reconciliation performed, as per his request I removed his prostate support multivitamin, his sildenafil, his zinc, his quercetin, and his turmeric

## 2024-09-10 NOTE — PROGRESS NOTES
Ambulatory Visit  Name: Doug Vargas      : 1955      MRN: [id number]  Encounter Provider: Flako Vilchis MD  Encounter Date: 2024   Encounter department: [facility] [place]    Assessment & Plan  Medication management  Medication reconciliation performed, as per his request I removed his prostate support multivitamin, his sildenafil, his zinc, his quercetin, and his turmeric         History of needle biopsy of prostate with negative result       History of negative prostate biopsy in , this did show some inflammatory cells.  His PSA has historically been between 4 and 6, recently it was a PSA of 8.  On his rectal exam his prostate is smooth and without nodules, roughly 35 g.  I have ordered a repeat PSA.    Unfortunately he has a deep brain stimulator and is unable to have a prostate MRI.  If his repeat PSA has increased further we will recommend an office-based prostate biopsy with his holding his Xarelto 3 days prior and reinitiating his 3 days thereafter.  Elevated PSA    Orders:    PSA, total and free; Future    Obesity, morbid (HCC)       This plays into clinical decision making with regard to ongoing biopsy versus surveillance as well as playing into decision making for treatment versus surveillance versus surgical versus radiation management of any clinically significant prostate cancer that may be diagnosed in the future          History of Present Illness     Doug Vargas is a 69 y.o. male who presents for follow-up of elevated PSA.  He is status post a negative biopsy sometime ago.  PSA has increased since that time.    The following portions of the patient's history were reviewed and updated as appropriate: allergies, current medications, past family history, past medical history, past social history, past surgical history and problem list.      Review of Systems   Constitutional: Negative.    HENT: Negative.     Eyes: Negative.    Respiratory: Negative.      Cardiovascular: Negative.    Gastrointestinal: Negative.    Endocrine: Negative.    Genitourinary: Negative.    Musculoskeletal: Negative.    Skin: Negative.    Allergic/Immunologic: Negative.    Neurological: Negative.    Hematological: Negative.    Psychiatric/Behavioral: Negative.               Objective     There were no vitals taken for this visit.  Physical Exam  Vitals reviewed.   Constitutional:       General: He is not in acute distress.     Appearance: Normal appearance. He is well-developed. He is not ill-appearing, toxic-appearing or diaphoretic.   HENT:      Head: Normocephalic and atraumatic.      Nose: Nose normal.      Mouth/Throat:      Mouth: Mucous membranes are moist.   Eyes:      General: No scleral icterus.        Right eye: No discharge.         Left eye: No discharge.   Neck:      Thyroid: No thyromegaly.      Trachea: No tracheal deviation.   Pulmonary:      Effort: Pulmonary effort is normal.   Chest:      Chest wall: No tenderness.   Abdominal:      General: There is no distension.      Palpations: There is no mass.      Tenderness: There is no abdominal tenderness. There is no guarding.      Hernia: No hernia is present.   Musculoskeletal:         General: No deformity or signs of injury. Normal range of motion.      Cervical back: Normal range of motion and neck supple.   Lymphadenopathy:      Cervical: No cervical adenopathy.   Skin:     General: Skin is dry.      Coloration: Skin is not jaundiced or pale.      Findings: No erythema.   Neurological:      Mental Status: He is alert and oriented to person, place, and time.      Cranial Nerves: No cranial nerve deficit.      Motor: No abnormal muscle tone.      Coordination: Coordination normal.   Psychiatric:         Mood and Affect: Mood normal.         Behavior: Behavior normal.         Thought Content: Thought content normal.         Judgment: Judgment normal.     Results  Lab Results   Component Value Date    PSA 5.0 (H) 04/05/2022     PSA 5.0 (H) 10/08/2021   PSA 6.5 as of 11/2023    Lab Results   Component Value Date    GLUCOSE 95 04/22/2014    CALCIUM 9.4 04/05/2022     04/22/2014    K 5.0 04/05/2022    CO2 29 04/05/2022     04/05/2022    BUN 18 04/05/2022    CREATININE 0.97 04/05/2022     Lab Results   Component Value Date    WBC 8.89 04/05/2022    HGB 15.2 04/05/2022    HCT 46.8 04/05/2022    MCV 99 (H) 04/05/2022     04/05/2022       Office Urine Dip  No results found for this or any previous visit (from the past 1 hour(s)).]    Prostate biopsy in 2021 showed benign prostate tissue with some chronic inflammation and small foci of atypical glands    Administrative Statements

## 2024-09-10 NOTE — ASSESSMENT & PLAN NOTE
History of negative prostate biopsy in 2021, this did show some inflammatory cells.  His PSA has historically been between 4 and 6, recently it was a PSA of 8.  On his rectal exam his prostate is smooth and without nodules, roughly 35 g.  I have ordered a repeat PSA.    Unfortunately he has a deep brain stimulator and is unable to have a prostate MRI.  If his repeat PSA has increased further we will recommend an office-based prostate biopsy with his holding his Xarelto 3 days prior and reinitiating his 3 days thereafter.

## 2024-09-10 NOTE — PATIENT INSTRUCTIONS
PROSTATE CANCER SCREENING OVERVIEW    Prostate cancer screening involves testing for prostate cancer in men who have no symptoms of the disease. This testing can find cancer at an early stage. However, medical experts agree that prostate cancer screening should not be routinely ordered for all men and that screening can lead to both benefits and harms.    This article is designed to review the advantages and disadvantages of prostate cancer screening. You should talk with your health care provider to decide what is best in your individual situation.    WHAT IS PROSTATE CANCER?  Prostate cancer is a cancer of the prostate, a small gland in men that is located below the bladder and in front of the rectum (figure 1). The prostate produces fluid that helps carry sperm during ejaculation.  Although many men are diagnosed with prostate cancer, most of them do not die from their cancer. Prostate cancer often grows so slowly that many men die of other causes before they even develop symptoms of prostate cancer.    PROSTATE CANCER RISK FACTORS  Age -- All men are at risk for prostate cancer, but the risk greatly increases with older age. Prostate cancer is rarely found in men younger than 50 years old.    Ethnic background --  men develop prostate cancer more often than white and  men.  men also are more likely to die of prostate cancer than white or  men.    Family medical history -- Men who have a first-degree relative (a father or brother) with prostate cancer are more likely to develop the disease. Men with female relatives with breast cancer related to the breast cancer gene (BRCA) may also be more likely to develop prostate cancer.    Diet -- A diet high in animal fat or low in vegetables may increase a man's risk of prostate cancer.    PROSTATE CANCER SCREENING TESTS  Prostate cancer screening involves blood test that measures prostate-specific antigen  "(PSA).  Prostate-specific antigen (PSA) -- PSA is a protein produced by the prostate. The PSA test measures the amount of PSA in a sample of blood. Although many men with prostate cancer have an elevated PSA concentration, a high level does not necessarily mean there is a cancer.  The most common cause for an elevated PSA is benign prostatic hyperplasia (BPH), a noncancerous enlargement of the prostate. Other causes include prostate infection (prostatitis), trauma (bicycle riding), and sexual activity. You should avoid ejaculating or riding a bike for at least 48 hours before having a PSA test. (See \"Patient education: Benign prostatic hyperplasia (BPH) (Beyond the Basics)\".)    Rectal examination -- A rectal examination is sometimes recommended, along with measurement of the PSA, to screen for prostate cancer. However, studies have not shown that rectal examination is an effective screening test for prostate cancer when used alone.    If the PSA test is positive -- A positive PSA test is not a reason to panic; noncancerous conditions are the most common causes for an abnormal test, particularly for PSA tests. On the other hand, a positive test should not be ignored.    The first step in evaluating an elevated PSA is usually to repeat the test.  You should avoid ejaculating and riding a bike for at least 48 hours before repeating the test. If the PSA remains elevated, a prostate biopsy or other testing is usually recommended.    Prostate biopsy -- A prostate biopsy involves having a rectal ultrasound and use of a needle to obtain tissue samples from the prostate gland. The biopsy is usually performed in the office by a urologist (a doctor who specializes in treatment of urinary, bladder, and prostate issues). After the procedure, most men feel sore and you may see some blood in the urine or semen, this can last for up to 4-6 weeks. Biopsies can rarely cause serious infections. Sometimes biopsies are guided by " "magnetic resonance imaging (MRI).    PROS AND CONS OF PROSTATE CANCER SCREENING    There are a number of arguments for and against prostate cancer screening.    Arguments for screening -- Experts in favor of prostate cancer screening cite the following arguments:    ?Results from a large  study of prostate cancer screening found that men who had prostate-specific antigen (PSA) testing had a 20 percent lower chance of dying from prostate cancer after 13 years compared with men who did not have prostate cancer screening [1]. Men who had PSA testing also had a 30 percent lower chance of developing metastatic disease (cancer that has spread to other parts of the body) [2].  In another  study of prostate cancer screening, the mortality benefit was even larger to prostate cancer screening  (the [place] trial)    ?A substantial number of men die from prostate cancer every year and many more suffer from the complications of advanced disease.    ?For men with an aggressive prostate cancer, the best chance for curing it is by finding it at an early stage and then treating it with surgery or radiation. Studies have shown that men who have prostate cancer detected by PSA screening tend to have earlier-stage cancer than men who have a cancer detected by other means. (See \"Patient education: Treatment for advanced prostate cancer (Beyond the Basics)\" and \"Patient education: Prostate cancer treatment; stage I to III cancer (Beyond the Basics)\".)    ?The five-year survival for men who have prostate cancer confined to the prostate gland (early stage) is nearly 100 percent; this drops to 30 percent for men whose cancer has spread to other areas of the body. However, many early-stage cancers are not aggressive, and the five-year survival for those will be nearly 100 percent even without any treatment [3].    ?The available screening tests are not perfect, but they are easy to perform and have fair accuracy.  Arguments " against screening -- Other arguments have also been made against screening:    ?Even though the  study found a benefit of prostate cancer screening, PSA testing prevented only about one prostate cancer death for every 1000 screened men after 13 years [1]. Furthermore, 75 percent of men with an abnormal PSA who had a prostate biopsy did not have prostate cancer.  However, in the [facility] study, the number needed to screen and treat was much lower indicating that prostate cancer screening is ineffective screening measure which decreases the morbidity and mortality of prostate cancer.    ?Many prostate cancers detected with screening are unlikely to cause death or disability. Thus, a number of men will be diagnosed with cancer and potentially suffer the side effects of cancer treatment for cancers that never would have been found without prostate cancer screening. In other words, even if screening finds a cancer early, it is not clear in all cases that the cancer must be treated.    IS PROSTATE CANCER SCREENING RIGHT FOR ME?    The answer to this question is not the same for everyone. The table includes some questions you can ask yourself when weighing the potential risk and benefits of screening (table 1).  Professional organizations -- Major medical associations and societies, including the US Preventive Services Task Force [4], American Cancer Society [5], American Urological Association [6], and many  cancer societies, agree that men should discuss screening with their health care providers. Men should be informed about the benefits and risks of prostate cancer screening and treatment and make decisions that best reflect their personal values and preferences.  Age to first consider screening -- Screening discussions should begin at age 50 years for men at average risk for developing prostate cancer. Men with risk factors for prostate cancer (such as black men or men with a father or brother who had  prostate cancer) may want to begin screening discussions at age 40 to 45 years.  How often to perform screening -- Once screening begins, it should occur every two to four years (if continued testing is desired) and should include a PSA blood test.  Age to stop screening -- Guidelines suggest stopping screening after age 69, though some experts would continue offering screening to very healthy men beyond that age.  Screening not recommended -- Screening is generally not recommended for men whose life expectancy, or ability to undergo curative treatment, is limited by serious health problems. In these situations, the potential benefits of screening are outweighed by the likely harms.  WHERE TO GET MORE INFORMATION  Your healthcare provider is the best source of information for questions and concerns related to your medical problem.  This article will be updated as needed on our web site ([email or web address]). Related topics for patients, as well as selected articles written for healthcare professionals, are also available. Some of the most relevant are listed below.  Patient level information -- [employer] offers two types of patient education materials.  The Basics -- The Basics patient education pieces answer the four or five key questions a patient might have about a given condition. These articles are best for patients who want a general overview and who prefer short, easy-to-read materials.  Patient education: Prostate cancer screening (PSA tests) (The Basics)  Patient education: Prostate cancer (The Basics)  Patient education: Cancer screening (The Basics)  Patient education: Choosing treatment for low-risk localized prostate cancer (The Basics)  Beyond the Basics -- Beyond the Basics patient education pieces are longer, more sophisticated, and more detailed. These articles are best for patients who want in-depth information and are comfortable with some medical jargon.  Patient education: Treatment for  advanced prostate cancer (Beyond the Basics)  Patient education: Prostate cancer treatment; stage I to III cancer (Beyond the Basics)  Patient education: Benign prostatic hyperplasia (BPH) (Beyond the Basics)  Professional level information -- Professional level articles are designed to keep doctors and other health professionals up-to-date on the latest medical findings. These articles are thorough, long, and complex, and they contain multiple references to the research on which they are based. Professional level articles are best for people who are comfortable with a lot of medical terminology and who want to read the same materials their doctors are reading.  Measurement of prostate-specific antigen  Screening for prostate cancer  The following organizations also provide reliable health information.  ?National Cancer [place]  9-909-0-CANCER  (www.cancer.gov/types/prostate)  ?American Society for Clinical Oncology (patient information website)  (www.cancer.net)  ?National Comprehensive Cancer Network (patient and caregiver information website)  (www.nccn.org/patients)  ?American Cancer Society  9-419-HWI-2345  (www.cancer.org)  ?National Library of Medicine  (www.medlineplus.gov/healthtopics.html)  ?[employer] Prostate Cancer Education and Support  (www.ustoo.org/Detection-PSA-And-BREANA)

## 2024-09-17 ENCOUNTER — OFFICE VISIT (OUTPATIENT)
Dept: UROLOGY | Facility: HOSPITAL | Age: 69
End: 2024-09-17
Payer: MEDICARE

## 2024-09-17 VITALS
DIASTOLIC BLOOD PRESSURE: 86 MMHG | HEART RATE: 70 BPM | WEIGHT: 266 LBS | OXYGEN SATURATION: 96 % | SYSTOLIC BLOOD PRESSURE: 144 MMHG | BODY MASS INDEX: 35.25 KG/M2 | HEIGHT: 73 IN

## 2024-09-17 DIAGNOSIS — Z79.899 MEDICATION MANAGEMENT: Primary | ICD-10-CM

## 2024-09-17 DIAGNOSIS — E66.01 OBESITY, MORBID (HCC): ICD-10-CM

## 2024-09-17 DIAGNOSIS — Z98.890 HISTORY OF NEEDLE BIOPSY OF PROSTATE WITH NEGATIVE RESULT: ICD-10-CM

## 2024-09-17 DIAGNOSIS — R97.20 ELEVATED PSA: ICD-10-CM

## 2024-09-17 PROCEDURE — 99214 OFFICE O/P EST MOD 30 MIN: CPT | Performed by: UROLOGY

## 2024-09-17 RX ORDER — RIVAROXABAN 10 MG/1
20 TABLET, FILM COATED ORAL
COMMUNITY

## 2024-09-17 RX ORDER — OMEGA-3S/DHA/EPA/FISH OIL/D3 300MG-1000
5000 CAPSULE ORAL DAILY
COMMUNITY

## 2024-09-17 RX ORDER — CALCIUM CARBONATE/VITAMIN D3 500-10/5ML
LIQUID (ML) ORAL
COMMUNITY

## 2024-09-17 NOTE — ASSESSMENT & PLAN NOTE
This plays into clinical decision making with regard to ongoing biopsy versus surveillance as well as playing into decision making for treatment versus surveillance versus surgical versus radiation management of any clinically significant prostate cancer that may be diagnosed in the future

## 2024-09-25 ENCOUNTER — PROCEDURE VISIT (OUTPATIENT)
Dept: NEUROLOGY | Facility: CLINIC | Age: 69
End: 2024-09-25
Payer: MEDICARE

## 2024-09-25 VITALS
BODY MASS INDEX: 36.44 KG/M2 | HEART RATE: 70 BPM | DIASTOLIC BLOOD PRESSURE: 78 MMHG | SYSTOLIC BLOOD PRESSURE: 128 MMHG | WEIGHT: 276.2 LBS

## 2024-09-25 DIAGNOSIS — Z96.89 S/P DEEP BRAIN STIMULATOR PLACEMENT: ICD-10-CM

## 2024-09-25 DIAGNOSIS — G25.0 BENIGN ESSENTIAL TREMOR: Primary | ICD-10-CM

## 2024-09-25 PROCEDURE — 95983 ALYS BRN NPGT PRGRMG 15 MIN: CPT | Performed by: PSYCHIATRY & NEUROLOGY

## 2024-09-25 PROCEDURE — 95984 ALYS BRN NPGT PRGRMG ADDL 15: CPT | Performed by: PSYCHIATRY & NEUROLOGY

## 2024-09-25 NOTE — PROGRESS NOTES
Ambulatory Visit  Name: Doug Vargas      : 1955      MRN: [id number]  Encounter Provider: Kimberlyn Riojas MD  Encounter Date: 2024   Encounter department: [facility] [place]    Assessment & Plan  Benign essential tremor  Longstanding history of essential tremor with left VIM placement which has provided good control of right hand.  He had mild breakthrough distal tremor on exam which is bothersome.  He wished to optimize this.  Deep brain stimulator was interrogated.  Slow increases in amplitude made.  Mild delayed sensation of dizziness.  He will remain on current settings and if there is no improvement over the next day he could utilize his patient  to reduce from 2.4-3.3 see if this resolves.  See body of the clinical note for details.    Moderate amplitude postural and action tremors of the left hand have been more more bothersome.  Once again we discussed the option of implantation of right VIM DBS.  Questions with regards to high focus ultrasound was answered.  This cannot be done with DBS system in place.  Questions with regards to neurosurgical options at [facility] and at other locations were answered.  He wishes to hold off fentanyl because of his schedule.  He will contact the office he wishes to have referral to neurosurgery for further discussion.  Approximately 30 minutes spent on DBS interrogation, evaluation, and discussion with regards to right DBS and placement.       S/P deep brain stimulator placement             History of Present Illness   Mr. Doug Vargas is a right-handed retired  with essential tremor s/p LEFT VIM DBS, left chest placed 3/28/2000 in [place] by Dr. 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 To review, action tremor first noted around the 9th grade. He has been on Valium 10mg daily (since 9th grade) and Inderal 80mg daily all his life. Previously on gabapentin  but this was associated with sedation. Never tried topiramate or primidone. Overtime he has developed a left hand tremor and has contemplated but has yet to proceeded with R lead placement. He is not interested in further medication trials for  tremor.    He remains on propranolol 80mg daily, and diazepam 10mg daily.  Tremors have remained about the same.   He uses his right hand for activities of daily living. Left hand tremor      He has changes in balance since the DVT last year. H eis however no longer dragging his foot       Review of Systems  I have personally reviewed the MA's review of systems and made changes as necessary.      Objective     /78 (BP Location: Left arm, Patient Position: Sitting, Cuff Size: Large)   Pulse 70   Wt 125 kg (276 lb 3.2 oz)   BMI 36.44 kg/m²     Physical Exam  Vitals reviewed.   Eyes:      Extraocular Movements: EOM normal.      Pupils: Pupils are equal, round, and reactive to light.   Neurological:      Mental Status: He is oriented to person, place, and time.      Motor: Motor strength is normal.     Gait: Gait is intact.   Psychiatric:         Speech: Speech normal.       Neurologic Exam     Mental Status   Oriented to person, place, and time.   Follows 3 step commands.   Attention: normal. Concentration: normal.   Speech: speech is normal   Level of consciousness: alert  Normal comprehension.     Cranial Nerves     CN III, IV, VI   Pupils are equal, round, and reactive to light.  Extraocular motions are normal.     CN VII   Facial expression full, symmetric.     CN VIII   Hearing: intact    CN IX, X   Palate: symmetric    CN XI   Right trapezius strength: normal  Left trapezius strength: normal    CN XII   Tongue deviation: none    Motor Exam   Muscle bulk: normal  Overall muscle tone: normal    Strength   Strength 5/5 throughout.     Sensory Exam   Light touch normal.     Gait, Coordination, and Reflexes     Gait  Gait: normal    Tremor   Resting tremor: [id number]  amplitude intentional tremor on finger-to-nose on the right.  This improved with summation changes.  Moderate amplitude postural and action tremor on the left.  No head, vocal, leg tremor.       Left VIM  Percept PC [id number]  [id number]  Implanted 4/14/2022  Battery 58%  EBL:  3 years   Impedance ok       (Group B)  1: 1.8  0:3.1  , 180Hz  3.1mA (0-3.4)    Slowly Increased to 3.4mA (0-3.4mA) with improvement in distal tremor.     Previous (Group A)  C+0-, PW90, 185Hz, 3.5mA

## 2024-09-25 NOTE — ASSESSMENT & PLAN NOTE
Longstanding history of essential tremor with left VIM placement which has provided good control of right hand.  He had mild breakthrough distal tremor on exam which is bothersome.  He wished to optimize this.  Deep brain stimulator was interrogated.  Slow increases in amplitude made.  Mild delayed sensation of dizziness.  He will remain on current settings and if there is no improvement over the next day he could utilize his patient  to reduce from 2.4-3.3 see if this resolves.  See body of the clinical note for details.    Moderate amplitude postural and action tremors of the left hand have been more more bothersome.  Once again we discussed the option of implantation of right VIM DBS.  Questions with regards to high focus ultrasound was answered.  This cannot be done with DBS system in place.  Questions with regards to neurosurgical options at [facility] and at other locations were answered.  He wishes to hold off fentanyl because of his schedule.  He will contact the office he wishes to have referral to neurosurgery for further discussion.  Approximately 30 minutes spent on DBS interrogation, evaluation, and discussion with regards to right DBS and placement.

## 2024-09-25 NOTE — PROGRESS NOTES
Review of Systems   Constitutional: Negative.  Negative for appetite change, fatigue and fever.   HENT: Negative.  Negative for hearing loss, tinnitus, trouble swallowing and voice change.    Eyes: Negative.  Negative for photophobia, pain and visual disturbance.   Respiratory: Negative.  Negative for shortness of breath.    Cardiovascular: Negative.  Negative for palpitations.   Gastrointestinal: Negative.  Negative for nausea and vomiting.   Endocrine: Negative.  Negative for cold intolerance.   Genitourinary: Negative.  Negative for dysuria, frequency and urgency.   Musculoskeletal:  Positive for gait problem (Shuffles at times w/ Right leg but does not occur often). Negative for back pain, myalgias, neck pain and neck stiffness.   Skin: Negative.  Negative for rash.   Allergic/Immunologic: Negative.    Neurological:  Positive for tremors (Hands, has stayed the same). Negative for dizziness, seizures, syncope, facial asymmetry, speech difficulty, weakness, light-headedness, numbness and headaches.   Hematological:  Bruises/bleeds easily.   Psychiatric/Behavioral: Negative.  Negative for confusion, hallucinations and sleep disturbance.    All other systems reviewed and are negative.

## 2024-12-02 DIAGNOSIS — G25.0 BENIGN FAMILIAL TREMOR: ICD-10-CM

## 2024-12-02 NOTE — TELEPHONE ENCOUNTER
Reason for call:   [x] Refill   [] Prior Auth  [] Other:     Office:   [] PCP/Provider -   [x] Specialty/Provider - Kimberlyn Riojas MD     Medication: diazepam (VALIUM) 10 mg tablet /  Take 1 tablet (10 mg total) by mouth daily    [address] [place] [facility]     Does the patient have enough for 3 days?   [x] Yes   [] No - Send as HP to POD

## 2024-12-03 RX ORDER — DIAZEPAM 10 MG/1
10 TABLET ORAL DAILY
Qty: 90 TABLET | Refills: 0 | Status: SHIPPED | OUTPATIENT
Start: 2024-12-03

## 2024-12-03 NOTE — TELEPHONE ENCOUNTER
Medication: Valium   PDMP  08/30/2024 08/30/2024 diazePAM (Tablet) 90.0 90 10 MG NA [id number] [place] 0 / 0 [place]   1 [id number] 06/03/2024 06/03/2024 diazePAM (Tablet) 90.0 90 10 MG NA [id number] [place] 0 [place]   1 [id number] 03/01/2024 03/01/2024 diazePAM (Tablet) 90.0 90 10 MG NA [id number] [place] 0 / 0 [place]  Active agreement on file -No

## 2025-02-28 DIAGNOSIS — G25.0 BENIGN FAMILIAL TREMOR: ICD-10-CM

## 2025-02-28 NOTE — TELEPHONE ENCOUNTER
Reason for call:   [x] Refill   [] Prior Auth  [] Other:     Office:   [] PCP/Provider -   [x] Specialty/Provider - neurology     Medication: diazepam     Dose/Frequency: 10 mg take daily     Quantity: 90- would like 3 additional refills for a year's supply     Pharmacy: [facility]    Does the patient have enough for 3 days?   [x] Yes   [] No - Send as HP to POD

## 2025-02-28 NOTE — TELEPHONE ENCOUNTER
2/03/2024 12/03/2024 diazePAM (Tablet) 90.0 90 10 MG NA [id number] [place] / 0 [place]   1 [id number] 08/30/2024 08/30/2024 diazePAM (Tablet) 90.0 90 10 MG NA [id number]

## 2025-03-03 RX ORDER — DIAZEPAM 10 MG/1
10 TABLET ORAL DAILY
Qty: 90 TABLET | Refills: 0 | Status: SHIPPED | OUTPATIENT
Start: 2025-03-03

## 2025-04-14 ENCOUNTER — APPOINTMENT (OUTPATIENT)
Dept: PHYSICAL THERAPY | Facility: CLINIC | Age: 70
End: 2025-04-14
Payer: MEDICARE

## 2025-04-22 ENCOUNTER — APPOINTMENT (OUTPATIENT)
Dept: PHYSICAL THERAPY | Facility: CLINIC | Age: 70
End: 2025-04-22
Payer: MEDICARE

## 2025-04-22 ENCOUNTER — EVALUATION (OUTPATIENT)
Dept: PHYSICAL THERAPY | Facility: CLINIC | Age: 70
End: 2025-04-22
Attending: FAMILY MEDICINE
Payer: MEDICARE

## 2025-04-22 DIAGNOSIS — R29.898 RIGHT LEG WEAKNESS: Primary | ICD-10-CM

## 2025-04-22 DIAGNOSIS — R26.89 IMBALANCE: ICD-10-CM

## 2025-04-22 PROCEDURE — 97161 PT EVAL LOW COMPLEX 20 MIN: CPT | Performed by: PHYSICAL THERAPIST

## 2025-04-22 PROCEDURE — 97110 THERAPEUTIC EXERCISES: CPT | Performed by: PHYSICAL THERAPIST

## 2025-04-22 NOTE — PROGRESS NOTES
PT Evaluation     Today's date: 2025  Patient name: Doug Vargas  : 1955  MRN: [id number]  Referring provider: Lexi Moe*  Dx:   Encounter Diagnosis     ICD-10-CM    1. Right leg weakness  R29.898       2. Imbalance  R26.89                        Assessment  Impairments: abnormal gait, abnormal muscle tone, activity intolerance, impaired balance, impaired physical strength, lacks appropriate home exercise program  Symptom irritability: low    Assessment details: Patient is a 70 y.o. male with PT prescription for lower extremity weakness, with additional complaints of imbalance. Patient presents with lower extremity weakness, especially of bilateral calf musculature, decreased balance in tandem stance and single limb stance, and functional balance impairment based on Functional Gait assessment score. These impairments limit patient with prolonged walking and standing, walking on uneven ground, performing household chores, transfers, recreational activities like hunting and fishing and place patient at a mild fall risk. Patient requires skilled PT to address above mentioned impairments and improve function in home and community. Patient is in agreement with this plan and responded well to initial treatment of home program participation and instruction.    Understanding of Dx/Px/POC: good     Prognosis: good    Goals  Short term goals:  Patient is independent in home program to support plan of care and improve function. - 2 weeks  Patient demonstrates improvement in balance with tandem stance of at least 20 seconds b/l, EO to reduce fall risk in home. - 2 weeks  Patient demonstrates improvement in balance in community with FGA score of 25 or better. - 3 weeks    Long term goals:  Patient demonstrates improvement in community participation with increase in FOTO score by 10%. - 6 weeks  Patient demonstrates improvement in functional strength with 5x sit to stand test of 12 seconds or less, to  correlate with less fall risk. - 6 weeks  Patient demonstrates at least 4/5 calf strength and 5/5 ankle inversion strength so he can walk on uneven ground and long distances with less difficulty. - 8 weeks     Plan  Patient would benefit from: skilled physical therapy    Planned therapy interventions: abdominal trunk stabilization, activity modification, joint mobilization, manual therapy, massage, neuromuscular re-education, patient education, postural training, strengthening, stretching, body mechanics training, therapeutic exercise, flexibility, functional ROM exercises, home exercise program and IASTM    Frequency: 2x week  Duration in weeks: 8  Plan of Care beginning date: 4/22/2025  Plan of Care expiration date: 6/20/2025  Treatment plan discussed with: patient    Subjective Evaluation    History of Present Illness  Date of onset: 9/1/2024  Mechanism of injury: Patient has PT prescription for lower extremity weakness. Patient reports that he had a DVT in 2023 which caused a lot of leg pain. He also reports having foot and toe pain/burning but when changing medication helped this. He feels that he was less active around this time which led to some weakness. He thinks is balance has been worsening over the past 6 months but unsure why other than leg weakness. He saw PCP and was given script for therapy.    Symptoms: lower extremity weakness and imbalance. Patient has difficulty getting up from a seated kneeling position, getting up from low seat, imbalance especially when turning around quickly, difficulty prolonged standing and walking due to LE pain and weakness. He no longer climbs ladders due to weakness and imbalance. He feels limited with working outside, hunting and fishing. He feels that he slips easily if walking on rocks. Patient can climb stairs reciprocally but has to use railing now.    Employment: retired    PMH: DBS, low back pain, h/o cervical radiculopathy, benign essential tremor     Patient  Goals  Patient goals for therapy: improve strength, improve balance      Objective     Postural Observations  Seated posture: fair, decreased lumbar lordosis, slouched posture  Standing posture: fair, decreased lumbar lordosis, slouched posture    Gait  Reciprocal gait pattern, b/l hip drop    Neurological Testing     Sensation     Lumbar   Left   Intact: light touch    Right   Intact: light touch      Strength/Myotome Testing     Left Hip   Planes of Motion   Flexion: 4+  Abduction: 5    Right Hip   Planes of Motion   Flexion: 4+  Abduction: 5    Left Knee   Flexion: 5  Extension: 5    Right Knee   Flexion: 5  Extension: 5    Left Ankle/Foot   Dorsiflexion: 5  Eversion: 5  Great toe extension: 5  Inversion: 4  Plantar flexion : 3 (6 partial single leg heel rises)    Right Ankle/Foot   Dorsiflexion: 5  Eversion: 5  Great toe extension: 5  Inversion: 4+  Plantarflexion: 3- (2 partial single leg heel rises)      Tests     Balance    Tandem stance: L 4 seconds, R 4 seconds  Single limb stance: L 1 seconds, R 2 seconds  NBOS : 30 seconds  NBOS EC: 30 seconds    Timed up and go (TUG) : 8 seconds  5x sit to stand: 17.73 seconds  FGA : 23/30           Precautions: fall risk. EPOC 6/20/2025      Manuals 4/22                                                                Neuro Re-Ed                          Tandem stance                                                                              Ther Ex                          Pt edu on pathology, HEP, POC 8'                         Calf raises                                                                 Ther Activity                                       Gait Training                                       Modalities

## 2025-04-22 NOTE — LETTER
2025    Lexi Moe DO  [address] [facility] [address]    Patient: Doug Vargas   YOB: 1955   Date of Visit: 2025     Encounter Diagnosis     ICD-10-CM    1. Right leg weakness  R29.898       2. Imbalance  R26.89           Dear Dr. Lexi Moe, DO:    Thank you for your recent referral of Doug Vargas. Please review the attached evaluation summary from Doug's recent visit.     Please verify that you agree with the plan of care by signing the attached order.     If you have any questions or concerns, please do not hesitate to call.     I sincerely appreciate the opportunity to share in the care of one of your patients and hope to have another opportunity to work with you in the near future.       Sincerely,    DEANN Hanson, PT      Referring Provider:      I certify that I have read the below Plan of Care and certify the need for these services furnished under this plan of treatment while under my care.                    Lexi Moe DO  [address] [facility] [address]  Via Fax: [phone]          PT Evaluation     Today's date: 2025  Patient name: Doug Vargas  : 1955  MRN: [id number]  Referring provider: Lexi Moe*  Dx:   Encounter Diagnosis     ICD-10-CM    1. Right leg weakness  R29.898       2. Imbalance  R26.89                        Assessment  Impairments: abnormal gait, abnormal muscle tone, activity intolerance, impaired balance, impaired physical strength, lacks appropriate home exercise program  Symptom irritability: low    Assessment details: Patient is a 70 y.o. male with PT prescription for lower extremity weakness, with additional complaints of imbalance. Patient presents with lower extremity weakness, especially of bilateral calf musculature, decreased balance in tandem stance and single limb stance, and functional balance impairment based on Functional Gait assessment score. These  impairments limit patient with prolonged walking and standing, walking on uneven ground, performing household chores, transfers, recreational activities like hunting and fishing and place patient at a mild fall risk. Patient requires skilled PT to address above mentioned impairments and improve function in home and community. Patient is in agreement with this plan and responded well to initial treatment of home program participation and instruction.    Understanding of Dx/Px/POC: good     Prognosis: good    Goals  Short term goals:  Patient is independent in home program to support plan of care and improve function. - 2 weeks  Patient demonstrates improvement in balance with tandem stance of at least 20 seconds b/l, EO to reduce fall risk in home. - 2 weeks  Patient demonstrates improvement in balance in community with FGA score of 25 or better. - 3 weeks    Long term goals:  Patient demonstrates improvement in community participation with increase in FOTO score by 10%. - 6 weeks  Patient demonstrates improvement in functional strength with 5x sit to stand test of 12 seconds or less, to correlate with less fall risk. - 6 weeks  Patient demonstrates at least 4/5 calf strength and 5/5 ankle inversion strength so he can walk on uneven ground and long distances with less difficulty. - 8 weeks     Plan  Patient would benefit from: skilled physical therapy    Planned therapy interventions: abdominal trunk stabilization, activity modification, joint mobilization, manual therapy, massage, neuromuscular re-education, patient education, postural training, strengthening, stretching, body mechanics training, therapeutic exercise, flexibility, functional ROM exercises, home exercise program and IASTM    Frequency: 2x week  Duration in weeks: 8  Plan of Care beginning date: 4/22/2025  Plan of Care expiration date: 6/20/2025  Treatment plan discussed with: patient    Subjective Evaluation    History of Present Illness  Date of  onset: 9/1/2024  Mechanism of injury: Patient has PT prescription for lower extremity weakness. Patient reports that he had a DVT in 2023 which caused a lot of leg pain. He also reports having foot and toe pain/burning but when changing medication helped this. He feels that he was less active around this time which led to some weakness. He thinks is balance has been worsening over the past 6 months but unsure why other than leg weakness. He saw PCP and was given script for therapy.    Symptoms: lower extremity weakness and imbalance. Patient has difficulty getting up from a seated kneeling position, getting up from low seat, imbalance especially when turning around quickly, difficulty prolonged standing and walking due to LE pain and weakness. He no longer climbs ladders due to weakness and imbalance. He feels limited with working outside, hunting and fishing. He feels that he slips easily if walking on rocks. Patient can climb stairs reciprocally but has to use railing now.    Employment: retired    PMH: DBS, low back pain, h/o cervical radiculopathy, benign essential tremor     Patient Goals  Patient goals for therapy: improve strength, improve balance      Objective     Postural Observations  Seated posture: fair, decreased lumbar lordosis, slouched posture  Standing posture: fair, decreased lumbar lordosis, slouched posture    Gait  Reciprocal gait pattern, b/l hip drop    Neurological Testing     Sensation     Lumbar   Left   Intact: light touch    Right   Intact: light touch      Strength/Myotome Testing     Left Hip   Planes of Motion   Flexion: 4+  Abduction: 5    Right Hip   Planes of Motion   Flexion: 4+  Abduction: 5    Left Knee   Flexion: 5  Extension: 5    Right Knee   Flexion: 5  Extension: 5    Left Ankle/Foot   Dorsiflexion: 5  Eversion: 5  Great toe extension: 5  Inversion: 4  Plantar flexion : 3 (6 partial single leg heel rises)    Right Ankle/Foot   Dorsiflexion: 5  Eversion: 5  Great toe  extension: 5  Inversion: 4+  Plantarflexion: 3- (2 partial single leg heel rises)      Tests     Balance    Tandem stance: L 4 seconds, R 4 seconds  Single limb stance: L 1 seconds, R 2 seconds  NBOS : 30 seconds  NBOS EC: 30 seconds    Timed up and go (TUG) : 8 seconds  5x sit to stand: 17.73 seconds  FGA : 23/30           Precautions: fall risk. EPOC 6/20/2025      Manuals 4/22                                                                Neuro Re-Ed                          Tandem stance                                                                              Ther Ex                          Pt edu on pathology, HEP, POC 8'                         Calf raises                                                                 Ther Activity                                       Gait Training                                       Modalities

## 2025-04-24 ENCOUNTER — OFFICE VISIT (OUTPATIENT)
Dept: PHYSICAL THERAPY | Facility: CLINIC | Age: 70
End: 2025-04-24
Attending: FAMILY MEDICINE
Payer: MEDICARE

## 2025-04-24 DIAGNOSIS — R29.898 RIGHT LEG WEAKNESS: Primary | ICD-10-CM

## 2025-04-24 DIAGNOSIS — R26.89 IMBALANCE: ICD-10-CM

## 2025-04-24 PROCEDURE — 97112 NEUROMUSCULAR REEDUCATION: CPT | Performed by: PHYSICAL THERAPIST

## 2025-04-24 PROCEDURE — 97110 THERAPEUTIC EXERCISES: CPT | Performed by: PHYSICAL THERAPIST

## 2025-04-24 NOTE — PROGRESS NOTES
"Daily Note     Today's date: 2025  Patient name: Doug Vargas  : 1955  MRN: [id number]  Referring provider: Lexi Moe*  Dx:   Encounter Diagnosis     ICD-10-CM    1. Right leg weakness  R29.898       2. Imbalance  R26.89                      Subjective: patient reports that he is stiff and sore currently due to cutting the grass. He has been practicing tandem balance a lot.      Objective: See treatment diary below      Assessment: Tolerated treatment well. Began session on recumbent bike to improve LE strength and ROM. Patient demonstrated significant improvement in tandem balance compared to evaluation. Patient displayed appropriate fatigue with exercises. Patient would benefit from continued skilled PT per plan of care to address impairments and improve function.       Plan: Continue per plan of care.      Precautions: fall risk. EPOC 2025      Manuals                                                                Neuro Re-Ed                          Tandem stance  3x30\"           Semi tandem  3x30\" foam                        Lateral walks  3x10 ft                                     Ther Ex                          Pt edu on pathology, HEP, POC 8'            SL calf press  3x10 35#           Calf raises  2x10           Resisted inversion  20x gtb           Toe raise  20x           Bike for strength  5' lvl 1                        Ther Activity             Sit to stand  10x           Partial reverse lunge  10 ea           Gait Training                                       Modalities                                            "

## 2025-04-29 ENCOUNTER — OFFICE VISIT (OUTPATIENT)
Dept: PHYSICAL THERAPY | Facility: CLINIC | Age: 70
End: 2025-04-29
Payer: MEDICARE

## 2025-04-29 DIAGNOSIS — R26.89 IMBALANCE: ICD-10-CM

## 2025-04-29 DIAGNOSIS — R29.898 RIGHT LEG WEAKNESS: Primary | ICD-10-CM

## 2025-04-29 PROCEDURE — 97112 NEUROMUSCULAR REEDUCATION: CPT | Performed by: PHYSICAL THERAPIST

## 2025-04-29 PROCEDURE — 97530 THERAPEUTIC ACTIVITIES: CPT | Performed by: PHYSICAL THERAPIST

## 2025-04-29 PROCEDURE — 97110 THERAPEUTIC EXERCISES: CPT | Performed by: PHYSICAL THERAPIST

## 2025-04-29 NOTE — PROGRESS NOTES
"Daily Note     Today's date: 2025  Patient name: Doug Vargas  : 1955  MRN: [id number]  Referring provider: Lexi Moe*  Dx:   Encounter Diagnosis     ICD-10-CM    1. Right leg weakness  R29.898       2. Imbalance  R26.89                      Subjective: patient reports that he had some LE soreness from last session but it subsided and wasn't too bad. He has been consistent with home program.      Objective: See treatment diary below      Assessment: Tolerated treatment well. Patient was able to advance LE strengthening with increase in reps for resisted inversion, sets for calf raises and toe raises, and resistance for calf press. Patient would benefit from continued skilled PT per plan of care to address impairments and improve function.       Plan: Continue per plan of care.      Precautions: fall risk. EPOC 2025      Manuals                                                               Neuro Re-Ed                          Tandem stance  3x30\" 3x30\"          Semi tandem  3x30\" foam 2x30\" foam          Tandem walk   4x10 ft          Lateral walks  3x10 ft 3x10 ft                                    Ther Ex                          Pt edu on pathology, HEP, POC 8'            SL calf press  3x10 35# 3x10 65#          Calf raises  2x10 3x10          Resisted inversion  20x gtb 30x gtb          Toe raise  20x 3x10          Bike for strength  5' lvl 1 5' lvl 1                       Ther Activity             Sit to stand  10x 10x          Fwd lunge   10 ea          Partial reverse lunge  10 ea 10 ea          Gait Training                                       Modalities                                            "

## 2025-05-01 ENCOUNTER — OFFICE VISIT (OUTPATIENT)
Dept: PHYSICAL THERAPY | Facility: CLINIC | Age: 70
End: 2025-05-01
Attending: FAMILY MEDICINE
Payer: MEDICARE

## 2025-05-01 DIAGNOSIS — R26.89 IMBALANCE: ICD-10-CM

## 2025-05-01 DIAGNOSIS — R29.898 RIGHT LEG WEAKNESS: Primary | ICD-10-CM

## 2025-05-01 PROCEDURE — 97112 NEUROMUSCULAR REEDUCATION: CPT | Performed by: PHYSICAL THERAPIST

## 2025-05-01 PROCEDURE — 97530 THERAPEUTIC ACTIVITIES: CPT | Performed by: PHYSICAL THERAPIST

## 2025-05-01 PROCEDURE — 97110 THERAPEUTIC EXERCISES: CPT | Performed by: PHYSICAL THERAPIST

## 2025-05-01 NOTE — PROGRESS NOTES
"Daily Note     Today's date: 2025  Patient name: Doug Vargas  : 1955  MRN: [id number]  Referring provider: Lexi Moe*  Dx:   Encounter Diagnosis     ICD-10-CM    1. Right leg weakness  R29.898       2. Imbalance  R26.89                      Subjective: patient reports that he feels his walking and balance are better. He reports that he slipped on some leaves and landed on the ground (no injury) and was able to get up on his own with much less difficulty than normal.      Objective: See treatment diary below      Assessment: Tolerated treatment well. Progressed balance challenge with rockerboard; less difficulty seen with tandem stance and tandem walk. Patient was able able to increase resistance for single leg calf press today. Patient would benefit from continued skilled PT per plan of care to address impairments and improve function.       Plan: Continue per plan of care.      Precautions: fall risk. EPOC 2025      Manuals                                                              Neuro Re-Ed                          Tandem stance  3x30\" 3x30\" 3x30\"         Semi tandem  3x30\" foam 2x30\" foam          Tandem walk   4x10 ft 6x10ft         Lateral walks  3x10 ft 3x10 ft          Rocker board m-l, a-p    20 ea                      Ther Ex                          Pt edu on pathology, HEP, POC 8'            SL calf press  3x10 35# 3x10 65# 3x10 75/3         Calf raises  2x10 3x10 30x         Resisted inversion  20x gtb 30x gtb 30x gtb         Toe raise  20x 3x10 30x         Bike for strength  5' lvl 1 5' lvl 1 6' lvl 1                      Ther Activity             Sit to stand  10x 10x 10x         Fwd lunge   10 ea 10 ea         Partial reverse lunge  10 ea 10 ea 10 ea         Gait Training                                       Modalities                                            "

## 2025-05-06 ENCOUNTER — OFFICE VISIT (OUTPATIENT)
Dept: PHYSICAL THERAPY | Facility: CLINIC | Age: 70
End: 2025-05-06
Payer: MEDICARE

## 2025-05-06 DIAGNOSIS — R29.898 RIGHT LEG WEAKNESS: Primary | ICD-10-CM

## 2025-05-06 DIAGNOSIS — R26.89 IMBALANCE: ICD-10-CM

## 2025-05-06 PROCEDURE — 97112 NEUROMUSCULAR REEDUCATION: CPT | Performed by: PHYSICAL THERAPIST

## 2025-05-06 PROCEDURE — 97530 THERAPEUTIC ACTIVITIES: CPT | Performed by: PHYSICAL THERAPIST

## 2025-05-06 PROCEDURE — 97110 THERAPEUTIC EXERCISES: CPT | Performed by: PHYSICAL THERAPIST

## 2025-05-08 ENCOUNTER — OFFICE VISIT (OUTPATIENT)
Dept: PHYSICAL THERAPY | Facility: CLINIC | Age: 70
End: 2025-05-08
Attending: FAMILY MEDICINE
Payer: MEDICARE

## 2025-05-08 DIAGNOSIS — R26.89 IMBALANCE: ICD-10-CM

## 2025-05-08 DIAGNOSIS — R29.898 RIGHT LEG WEAKNESS: Primary | ICD-10-CM

## 2025-05-08 PROCEDURE — 97112 NEUROMUSCULAR REEDUCATION: CPT | Performed by: PHYSICAL THERAPIST

## 2025-05-08 PROCEDURE — 97110 THERAPEUTIC EXERCISES: CPT | Performed by: PHYSICAL THERAPIST

## 2025-05-08 PROCEDURE — 97530 THERAPEUTIC ACTIVITIES: CPT | Performed by: PHYSICAL THERAPIST

## 2025-05-08 NOTE — PROGRESS NOTES
"Daily Note     Today's date: 2025  Patient name: Doug Vargas  : 1955  MRN: [id number]  Referring provider: Lexi Moe*  Dx:   Encounter Diagnosis     ICD-10-CM    1. Right leg weakness  R29.898       2. Imbalance  R26.89                      Subjective: patient reports that the soreness he had earlier this week is gone. Patient reports that his walking tolerance is definitely getting better. Patient still doesn't feel comfortable going up and down ladders.       Objective: See treatment diary below      Assessment: Tolerated treatment well. Progressed ankle strengthening with transition to calf raises over step and heel walking. Patient tolerated balance exercises well today too with fewer losses of balance. Patient would benefit from continued skilled PT per plan of care to address impairments and improve function.       Plan: Continue per plan of care.      Precautions: fall risk. [facility] 2025      Manuals                                                            Neuro Re-Ed                          Tandem stance  3x30\" 3x30\" 3x30\" 3x30\" foam 3x30\"       Semi tandem  3x30\" foam 2x30\" foam          Tandem walk   4x10 ft 6x10ft 6x10 ft 6x10 ft       Lateral walks  3x10 ft 3x10 ft   4x10 ft on foam       Rocker board m-l, a-p    20 ea 20 ea 20 ea                    Ther Ex                          Pt edu on pathology, HEP, POC 8'            SL calf press  3x10 35# 3x10 65# 3x10 75# 3x10 75# 3x10 75#       Calf raises  2x10 3x10 30x 4x10 3x10 over step       Resisted inversion  20x gtb 30x gtb 30x gtb 30x bltb 30x bltb       Toe raise  20x 3x10 30x 30x  30x       Bike for strength  5' lvl 1 5' lvl 1 6' lvl 1 6' lvl 1 6' lvl 1       Heel walking      4x10ft       Ther Activity             Sit to stand  10x 10x 10x 10x 2x10       Fwd lunge   10 ea 10 ea  10 ea       Partial reverse lunge  10 ea 10 ea 10 ea 2x10 ea 10 ea       Gait Training                           "             Modalities

## 2025-05-13 ENCOUNTER — OFFICE VISIT (OUTPATIENT)
Dept: PHYSICAL THERAPY | Facility: CLINIC | Age: 70
End: 2025-05-13
Payer: MEDICARE

## 2025-05-13 DIAGNOSIS — R26.89 IMBALANCE: ICD-10-CM

## 2025-05-13 DIAGNOSIS — R29.898 RIGHT LEG WEAKNESS: Primary | ICD-10-CM

## 2025-05-13 PROCEDURE — 97110 THERAPEUTIC EXERCISES: CPT | Performed by: PHYSICAL THERAPIST

## 2025-05-13 PROCEDURE — 97112 NEUROMUSCULAR REEDUCATION: CPT | Performed by: PHYSICAL THERAPIST

## 2025-05-13 PROCEDURE — 97530 THERAPEUTIC ACTIVITIES: CPT | Performed by: PHYSICAL THERAPIST

## 2025-05-13 NOTE — PROGRESS NOTES
"Daily Note     Today's date: 2025  Patient name: Doug Vargas  : 1955  MRN: [id number]  Referring provider: Lexi Moe*  Dx:   Encounter Diagnosis     ICD-10-CM    1. Right leg weakness  R29.898       2. Imbalance  R26.89                      Subjective: patient reports that he still feels a little unstable with prolonged walking from car into clinic, but his balance is improved overall.      Objective: See treatment diary below      Assessment: Tolerated treatment well. Patient required extra rest breaks with sit to stand today. Progressed balance exercises with addition of SLS at countertop and added this to home program. Patient would benefit from continued skilled PT per plan of care to address impairments and improve function.       Plan: Continue per plan of care.      Precautions: fall risk. EPOC 2025      Manuals                                                           Neuro Re-Ed             SLS       2x30\"      Tandem stance  3x30\" 3x30\" 3x30\" 3x30\" foam 3x30\" 3x30\" foam      Semi tandem  3x30\" foam 2x30\" foam          Tandem walk   4x10 ft 6x10ft 6x10 ft 6x10 ft 6x10 ft      Lateral walks  3x10 ft 3x10 ft   4x10 ft on foam 4x10 ft on foam      Rocker board m-l, a-p    20 ea 20 ea 20 ea nv                   Ther Ex                          Pt edu on pathology, HEP, POC 8'            SL calf press  3x10 35# 3x10 65# 3x10 75# 3x10 75# 3x10 75# 3x12 75#      Calf raises  2x10 3x10 30x 4x10 3x10 over step 3x10 over step      Resisted inversion  20x gtb 30x gtb 30x gtb 30x bltb 30x bltb 30x bktb      Toe raise  20x 3x10 30x 30x  30x 30x      Bike for strength  5' lvl 1 5' lvl 1 6' lvl 1 6' lvl 1 6' lvl 1 6' lvl 2      Heel walking      4x10ft 6x10 ft      Ther Activity             Sit to stand  10x 10x 10x 10x 2x10 2x10      Fwd lunge   10 ea 10 ea  10 ea 10 ea      Partial reverse lunge  10 ea 10 ea 10 ea 2x10 ea 10 ea 10 ea      Gait Training     "                                   Modalities

## 2025-05-15 ENCOUNTER — OFFICE VISIT (OUTPATIENT)
Dept: PHYSICAL THERAPY | Facility: CLINIC | Age: 70
End: 2025-05-15
Attending: FAMILY MEDICINE
Payer: MEDICARE

## 2025-05-15 DIAGNOSIS — R26.89 IMBALANCE: ICD-10-CM

## 2025-05-15 DIAGNOSIS — R29.898 RIGHT LEG WEAKNESS: Primary | ICD-10-CM

## 2025-05-15 PROCEDURE — 97112 NEUROMUSCULAR REEDUCATION: CPT | Performed by: PHYSICAL THERAPIST

## 2025-05-15 PROCEDURE — 97530 THERAPEUTIC ACTIVITIES: CPT | Performed by: PHYSICAL THERAPIST

## 2025-05-15 NOTE — PROGRESS NOTES
"Daily Note     Today's date: 5/15/2025  Patient name: Doug Vargas  : 1955  MRN: [id number]  Referring provider: Lexi Moe*  Dx:   Encounter Diagnosis     ICD-10-CM    1. Right leg weakness  R29.898       2. Imbalance  R26.89                      Subjective: patient reports that he has a lot of difficulty with single limb balance exercise. He is inquiring about going for regular walks would be helpful or detrimental.       Objective: See treatment diary below    Limited session today due to time constraints as patient had another appointment.     Assessment: Tolerated treatment well. Instructed patient that regular walking would be good for overall stamina and health. Recommended patient gradually increase walking distance, starting at 10 minutes and seeing how body responds. Also recommended not walking on uneven ground if very challenged or fatigue after walking. Patient would benefit from continued skilled PT per plan of care to address impairments and improve function.       Plan: Continue per plan of care.      Precautions: fall risk. EPOC 2025      Manuals 4/22 4/24 4/29 5/1 5/6 5/8 5/13 5/15                                                         Neuro Re-Ed             SLS       2x30\" 2x30\"     Tandem stance  3x30\" 3x30\" 3x30\" 3x30\" foam 3x30\" 3x30\" foam      Semi tandem  3x30\" foam 2x30\" foam          Tandem walk   4x10 ft 6x10ft 6x10 ft 6x10 ft 6x10 ft 6x10 ft     Lateral walks  3x10 ft 3x10 ft   4x10 ft on foam 4x10 ft on foam 4x10 ft on foam beam     Rocker board m-l, a-p    20 ea 20 ea 20 ea nv 30 ea                  Ther Ex                          Pt edu on pathology, HEP, POC 8'            SL calf press  3x10 35# 3x10 65# 3x10 75# 3x10 75# 3x10 75# 3x12 75# 3x12 75#     Calf raises  2x10 3x10 30x 4x10 3x10 over step 3x10 over step 3x10 over step     Resisted inversion  20x gtb 30x gtb 30x gtb 30x bltb 30x bltb 30x bktb      Toe raise  20x 3x10 30x 30x  30x 30x 30x     Bike " for strength  5' lvl 1 5' lvl 1 6' lvl 1 6' lvl 1 6' lvl 1 6' lvl 2 nv     Heel walking      4x10ft 6x10 ft 6x10 ft     Ther Activity             Sit to stand  10x 10x 10x 10x 2x10 2x10 2x10     Fwd lunge   10 ea 10 ea  10 ea 10 ea 10 ea     Partial reverse lunge  10 ea 10 ea 10 ea 2x10 ea 10 ea 10 ea 10 ea     Gait Training                                       Modalities

## 2025-05-20 ENCOUNTER — OFFICE VISIT (OUTPATIENT)
Dept: PHYSICAL THERAPY | Facility: CLINIC | Age: 70
End: 2025-05-20
Attending: FAMILY MEDICINE
Payer: MEDICARE

## 2025-05-20 DIAGNOSIS — R29.898 RIGHT LEG WEAKNESS: Primary | ICD-10-CM

## 2025-05-20 DIAGNOSIS — R26.89 IMBALANCE: ICD-10-CM

## 2025-05-20 PROCEDURE — 97112 NEUROMUSCULAR REEDUCATION: CPT | Performed by: PHYSICAL THERAPIST

## 2025-05-20 PROCEDURE — 97110 THERAPEUTIC EXERCISES: CPT | Performed by: PHYSICAL THERAPIST

## 2025-05-20 PROCEDURE — 97530 THERAPEUTIC ACTIVITIES: CPT | Performed by: PHYSICAL THERAPIST

## 2025-05-20 NOTE — PROGRESS NOTES
"Daily Note     Today's date: 2025  Patient name: Doug Vargas  : 1955  MRN: [id number]  Referring provider: Lexi Moe*  Dx:   Encounter Diagnosis     ICD-10-CM    1. Right leg weakness  R29.898       2. Imbalance  R26.89                      Subjective: patient reports that he had a fall recently when outside walking on leaves. He is unsure why exactly he fell because he denies dizziness or tripping. He was able to get up on his own. He feels this could limit him from returning to hunting in the fall. He also reports calf muscle fatigue/burn with prolonged walking (30 minutes).      Objective: See treatment diary below    Limited session today due to time constraints as patient had another appointment.     Assessment: Tolerated treatment well. Progressed balance exercises with addition of forward and lateral steps to bosu ball to help improve balance when patient is walking on uneven terrain. Patient was also able to increase resistance for calf press and reps for calf raises over step. Patient would benefit from continued skilled PT per plan of care to address impairments and improve function.       Plan: Continue per plan of care.  Progress Report NV.      Precautions: fall risk. EPOC 2025      Manuals 4/22 4/24 4/29 5/1 5/6 5/8 5/13 5/15 5/20 [facility]                                                       Neuro Re-Ed             SLS       2x30\" 2x30\" 2x30\"    Tandem stance  3x30\" 3x30\" 3x30\" 3x30\" foam 3x30\" 3x30\" foam  3x30\" foam                 Tandem walk   4x10 ft 6x10ft 6x10 ft 6x10 ft 6x10 ft 6x10 ft 6x10 ft    Lateral walks  3x10 ft 3x10 ft   4x10 ft on foam 4x10 ft on foam 4x10 ft on foam beam 4x10 ft on foam beam    Rocker board m-l, a-p    20 ea 20 ea 20 ea nv 30 ea 30 ea    Fwd and lateral step to bosu         15 ea    Ther Ex                          Pt edu on pathology, HEP, POC 8'            SL calf press  3x10 35# 3x10 65# 3x10 75# 3x10 75# 3x10 75# 3x12 75# 3x12 75# 3x10 " 85#    Calf raises  2x10 3x10 30x 4x10 3x10 over step 3x10 over step 3x10 over step 2x20 over step    Resisted inversion  20x gtb 30x gtb 30x gtb 30x bltb 30x bltb 30x bktb      Toe raise  20x 3x10 30x 30x  30x 30x 30x 2x20    Bike for strength  5' lvl 1 5' lvl 1 6' lvl 1 6' lvl 1 6' lvl 1 6' lvl 2 nv 6' lvl 2    Heel walking      4x10ft 6x10 ft 6x10 ft 6x10 ft    Ther Activity             Sit to stand  10x 10x 10x 10x 2x10 2x10 2x10 2x10    Fwd lunge   10 ea 10 ea  10 ea 10 ea 10 ea 10 ea no UE    Partial reverse lunge  10 ea 10 ea 10 ea 2x10 ea 10 ea 10 ea 10 ea     Gait Training                                       Modalities

## 2025-05-22 ENCOUNTER — OFFICE VISIT (OUTPATIENT)
Dept: PHYSICAL THERAPY | Facility: CLINIC | Age: 70
End: 2025-05-22
Attending: FAMILY MEDICINE
Payer: MEDICARE

## 2025-05-22 DIAGNOSIS — R26.89 IMBALANCE: ICD-10-CM

## 2025-05-22 DIAGNOSIS — R29.898 RIGHT LEG WEAKNESS: Primary | ICD-10-CM

## 2025-05-22 PROCEDURE — 97110 THERAPEUTIC EXERCISES: CPT | Performed by: PHYSICAL THERAPIST

## 2025-05-22 PROCEDURE — 97112 NEUROMUSCULAR REEDUCATION: CPT | Performed by: PHYSICAL THERAPIST

## 2025-05-22 NOTE — PROGRESS NOTES
Progress Report    Today's date: 2025  Patient name: Doug Vargas  : 1955  MRN: [id number]  Referring provider: Lexi Moe*  Dx:   Encounter Diagnosis     ICD-10-CM    1. Right leg weakness  R29.898       2. Imbalance  R26.89                      Subjective: patient reports that that overall he feels better. He still has difficulty prolonged standing and walking. However, he tries not to use railing now with steps, and he doesn't have as much difficulty getting up from a chair or the floor.       Objective: See treatment diary below    Tandem stance: 30 seconds b/l  Single limb stance: L 5 seconds, R 2 seconds     Timed up and go (TUG) : 30 seconds  5x sit to stand: 10.96 seconds  FGA :     R Plantarflexion: 3+ (12 partial single leg heel rises)   L Plantarflexion: 4- (15 partial single leg heel rises)     5x sit to stand: 17.73 seconds     Assessment: Tolerated treatment well. Patient has been treated for 10 sessions since evaluation 1 month ago. Treatment has consisted of therapeutic exercises and activities to improve lower strength and endurance, neuromuscular reeducation to improve balance, and patient education on home program and strategies to reduce falls. Objectively, patient demonstrates significant improvement in tandem balance, and 5x sit to stand functional strength. Functionally, patient notes less difficulty getting up from the floor or a seated position. Patient did have two falls over the past month which he attributes to slippery conditions, and is still limited with calf strength and single limb balance. Patient is responding well to treatment so far and would benefit from continued skilled PT per plan of care to address impairments and improve function.       Plan: Continue per plan of care.  Progress balance as tolerated.      Precautions: fall risk. [facility] 2025      Manuals 4/22 4/24 4/29 5/1 5/6 5/8 5/13 5/15 5/20 5/22                                         "               Neuro Re-Ed             SLS       2x30\" 2x30\" 2x30\" 2x30\"   Tandem stance  3x30\" 3x30\" 3x30\" 3x30\" foam 3x30\" 3x30\" foam  3x30\" foam tested                Tandem walk   4x10 ft 6x10ft 6x10 ft 6x10 ft 6x10 ft 6x10 ft 6x10 ft 6x10 ft   Lateral walks  3x10 ft 3x10 ft   4x10 ft on foam 4x10 ft on foam 4x10 ft on foam beam 4x10 ft on foam beam    Rocker board m-l, a-p    20 ea 20 ea 20 ea nv 30 ea 30 ea 30 ea   Fwd and lateral step to bosu         15 ea 15 ea   Ther Ex                          Pt edu on pathology, HEP, POC 8'            SL calf press  3x10 35# 3x10 65# 3x10 75# 3x10 75# 3x10 75# 3x12 75# 3x12 75# 3x10 85# 3x10 #   Calf raises  2x10 3x10 30x 4x10 3x10 over step 3x10 over step 3x10 over step 2x20 over step 3x20 over step   Resisted inversion  20x gtb 30x gtb 30x gtb 30x bltb 30x bltb 30x bktb      Toe raise  20x 3x10 30x 30x  30x 30x 30x 2x20 30x   Bike for strength  5' lvl 1 5' lvl 1 6' lvl 1 6' lvl 1 6' lvl 1 6' lvl 2 nv 6' lvl 2 6' lvl 2   Heel walking      4x10ft 6x10 ft 6x10 ft 6x10 ft    Ther Activity             Sit to stand  10x 10x 10x 10x 2x10 2x10 2x10 2x10 tested   Fwd lunge   10 ea 10 ea  10 ea 10 ea 10 ea 10 ea no UE    Partial reverse lunge  10 ea 10 ea 10 ea 2x10 ea 10 ea 10 ea 10 ea     Gait Training                                       Modalities                                            "

## 2025-05-27 ENCOUNTER — APPOINTMENT (OUTPATIENT)
Dept: PHYSICAL THERAPY | Facility: CLINIC | Age: 70
End: 2025-05-27
Attending: FAMILY MEDICINE
Payer: MEDICARE

## 2025-05-29 ENCOUNTER — OFFICE VISIT (OUTPATIENT)
Dept: PHYSICAL THERAPY | Facility: CLINIC | Age: 70
End: 2025-05-29
Attending: FAMILY MEDICINE
Payer: MEDICARE

## 2025-05-29 DIAGNOSIS — R29.898 RIGHT LEG WEAKNESS: Primary | ICD-10-CM

## 2025-05-29 DIAGNOSIS — R26.89 IMBALANCE: ICD-10-CM

## 2025-05-29 PROCEDURE — 97112 NEUROMUSCULAR REEDUCATION: CPT | Performed by: PHYSICAL THERAPIST

## 2025-05-29 PROCEDURE — 97110 THERAPEUTIC EXERCISES: CPT | Performed by: PHYSICAL THERAPIST

## 2025-05-29 NOTE — PROGRESS NOTES
"Daily Note    Today's date: 2025  Patient name: Doug Vargas  : 1955  MRN: [id number]  Referring provider: Lexi Moe*  Dx:   Encounter Diagnosis     ICD-10-CM    1. Right leg weakness  R29.898       2. 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 He notes this as big improvement in his stamina. He still has difficulty with balance.      Objective: See treatment diary below      Assessment: Tolerated treatment well. Significant emphasis today on balance training. Progressed exercises with single leg cone taps and eccentric calf raises. Patient is responding well to treatment so far and would benefit from continued skilled PT per plan of care to address impairments and improve function.       Plan: Continue per plan of care.  Progress balance as tolerated. Hip hikes next visit.     Precautions: fall risk. EPOC 2025      Manuals     5/6 5/8 5/13 5/15 5/20 5/22                                                       Neuro Re-Ed             SLS 3x30\"      2x30\" 2x30\" 2x30\" 2x30\"   Tandem stance 3x30\" foam    3x30\" foam 3x30\" 3x30\" foam  3x30\" foam tested   Cone tap 10 ea            Tandem walk 6x10 ft    6x10 ft 6x10 ft 6x10 ft 6x10 ft 6x10 ft 6x10 ft   Lateral walks 6x10 ft and fwd     4x10 ft on foam 4x10 ft on foam 4x10 ft on foam beam 4x10 ft on foam beam    Rocker board m-l, a-p 30 ea    20 ea 20 ea nv 30 ea 30 ea 30 ea   Fwd and lateral step to bosu 15 ea        15 ea 15 ea   Ther Ex             Eccentric calf raise 2x10 over step            Pt edu on pathology, HEP, POC 3'            SL calf press 3x10 105#    3x10 75# 3x10 75# 3x12 75# 3x12 75# 3x10 85# 3x10 #   Calf raises 10x    4x10 3x10 over step 3x10 over step 3x10 over step 2x20 over step 3x20 over step   Resisted inversion     30x bltb 30x bltb 30x bktb      Toe raise     " 30x  30x 30x 30x 2x20 30x   Bike for strength     6' lvl 1 6' lvl 1 6' lvl 2 nv 6' lvl 2 6' lvl 2   Heel walking      4x10ft 6x10 ft 6x10 ft 6x10 ft    Ther Activity             Sit to stand     10x 2x10 2x10 2x10 2x10 tested   Fwd lunge      10 ea 10 ea 10 ea 10 ea no UE    Partial reverse lunge     2x10 ea 10 ea 10 ea 10 ea     Gait Training                                       Modalities

## 2025-06-02 DIAGNOSIS — G25.0 BENIGN FAMILIAL TREMOR: ICD-10-CM

## 2025-06-02 RX ORDER — DIAZEPAM 10 MG/1
10 TABLET ORAL DAILY
Qty: 90 TABLET | Refills: 0 | Status: SHIPPED | OUTPATIENT
Start: 2025-06-02

## 2025-06-02 NOTE — TELEPHONE ENCOUNTER
Reason for call:   [x] Refill   [] Prior Auth  [x] Other: pt requesting 3 month supply    Office:   [] PCP/Provider -   [x] Specialty/Provider -       diazepam (VALIUM) 10 mg tablet 10 mg, Oral, Daily       Quantity: 3 months    Pharmacy: [facility]   Does the patient have enough for 3 days?   [] Yes   [x] No - Send as HP to POD    Mail Away Pharmacy   Does the patient have enough for 10 days?   [] Yes   [] No - Send as HP to POD

## 2025-06-02 NOTE — TELEPHONE ENCOUNTER
03/03/2025 03/03/2025 diazePAM (Tablet) 90.0 90 10 MG NA [id number] Medicare 0 / 0 [place]   1 [id number] 12/03/2024 12/03/2024 12/03/2024 diazePAM (Tablet) 90.0 90 10 MG NA [id number]

## 2025-06-05 ENCOUNTER — OFFICE VISIT (OUTPATIENT)
Dept: PHYSICAL THERAPY | Facility: CLINIC | Age: 70
End: 2025-06-05
Attending: FAMILY MEDICINE
Payer: MEDICARE

## 2025-06-05 DIAGNOSIS — R29.898 RIGHT LEG WEAKNESS: Primary | ICD-10-CM

## 2025-06-05 DIAGNOSIS — R26.89 IMBALANCE: ICD-10-CM

## 2025-06-05 PROCEDURE — 97110 THERAPEUTIC EXERCISES: CPT | Performed by: PHYSICAL THERAPIST

## 2025-06-05 PROCEDURE — 97112 NEUROMUSCULAR REEDUCATION: CPT | Performed by: PHYSICAL THERAPIST

## 2025-06-05 NOTE — PROGRESS NOTES
"Daily Note    Today's date: 2025  Patient name: Doug Vargas  : 1955  MRN: [id number]  Referring provider: Lexi Moe*  Dx:   Encounter Diagnosis     ICD-10-CM    1. Right leg weakness  R29.898       2. Imbalance  R26.89                      Subjective: patient reports that he still has difficulty with balance.       Objective: See treatment diary below      Assessment: Tolerated treatment well. Progressed proximal hip control and strength with addition of hip hikes. Progressed stepping balance to improve stability when loss of balance with forward and lateral repetitive steps. Patient performed with only minor losses of balance. Patient is responding well to treatment so far and would benefit from continued skilled PT per plan of care to address impairments and improve function.       Plan: Continue per plan of care.  Progress balance as tolerated.      Precautions: fall risk. EPOC 2025      Manuals 5/29 6/5   5/6 5/8 5/13 5/15 5/20 5/22                                                       Neuro Re-Ed             SLS 3x30\" 3x30\"     2x30\" 2x30\" 2x30\" 2x30\"   Tandem stance 3x30\" foam 3x30\" foam   3x30\" foam 3x30\" 3x30\" foam  3x30\" foam tested   Cone tap 10 ea 2x10 ea           Tandem walk 6x10 ft 6x10 ft   6x10 ft 6x10 ft 6x10 ft 6x10 ft 6x10 ft 6x10 ft   Lateral walks 6x10 ft and fwd 6x10 ft and fwd    4x10 ft on foam 4x10 ft on foam 4x10 ft on foam beam 4x10 ft on foam beam    Rocker board m-l, a-p 30 ea 30 ea   20 ea 20 ea nv 30 ea 30 ea 30 ea   Fwd and lateral step to bosu 15 ea 15 ea       15 ea 15 ea   Ther Ex             Eccentric calf raise 2x10 over step 2x10 over step           Pt edu on pathology, HEP, POC 3'            SL calf press 3x10 105# 3x10 105#   3x10 75# 3x10 75# 3x12 75# 3x12 75# 3x10 85# 3x10 #   Calf raises 10x    4x10 3x10 over step 3x10 over step 3x10 over step 2x20 over step 3x20 over step   Resisted inversion     30x bltb 30x bltb 30x bktb      Toe " raise     30x  30x 30x 30x 2x20 30x   Bike for strength     6' lvl 1 6' lvl 1 6' lvl 2 nv 6' lvl 2 6' lvl 2   Heel walking      4x10ft 6x10 ft 6x10 ft 6x10 ft    Ther Activity             Sit to stand     10x 2x10 2x10 2x10 2x10 tested   Fwd and lat repetitive step   10 ea           Fwd lunge      10 ea 10 ea 10 ea 10 ea no UE    Partial reverse lunge     2x10 ea 10 ea 10 ea 10 ea     Gait Training                                       Modalities

## 2025-06-12 ENCOUNTER — OFFICE VISIT (OUTPATIENT)
Dept: PHYSICAL THERAPY | Facility: CLINIC | Age: 70
End: 2025-06-12
Attending: FAMILY MEDICINE
Payer: MEDICARE

## 2025-06-12 DIAGNOSIS — R26.89 IMBALANCE: ICD-10-CM

## 2025-06-12 DIAGNOSIS — R29.898 RIGHT LEG WEAKNESS: Primary | ICD-10-CM

## 2025-06-12 PROCEDURE — 97110 THERAPEUTIC EXERCISES: CPT | Performed by: PHYSICAL THERAPIST

## 2025-06-12 PROCEDURE — 97112 NEUROMUSCULAR REEDUCATION: CPT | Performed by: PHYSICAL THERAPIST

## 2025-06-12 NOTE — PROGRESS NOTES
"Discharge Summary    Today's date: 2025  Patient name: Doug Vargas  : 1955  MRN: [id number]  Referring provider: Lexi Moe*  Dx:   Encounter Diagnosis     ICD-10-CM    1. Right leg weakness  R29.898       2. Imbalance  R26.89                      Subjective: patient reports that he still has difficulty with balance, however overall feels significant improvement. He also feels ready for discharge today.      Objective: See treatment diary below    Single limb stance: L 17 seconds, R 1-2 seconds    R Plantarflexion: 4- (18 partial single leg heel rises)   L Plantarflexion: 4 (21 partial single leg heel rises)   R inversion: 5  L inversion: 5    R hip abduction 4+  L hip abduction 5    Assessment: Tolerated treatment well. Patient demonstrates improvement in calf strength and single limb balance compared to progress report several weeks ago. At this time, patient is appropriate for discharge to home program to maintain improvements and further progress upon impairments. Educated patient on updated home program and discharge recommendations; he verbalized understanding to all education.       Plan: Discharge to home program.     Precautions: fall risk. EPOC 2025      Manuals 5/29 6/5 6/12     5/15 5/20 5/22                                                       Neuro Re-Ed             SLS 3x30\" 3x30\" tested     2x30\" 2x30\" 2x30\"   Tandem stance 3x30\" foam 3x30\" foam 3x30\" foam      3x30\" foam tested   Cone tap 10 ea 2x10 ea 10 ea          Tandem walk 6x10 ft 6x10 ft 6x10 ft     6x10 ft 6x10 ft 6x10 ft   Lateral walks 6x10 ft and fwd 6x10 ft and fwd      4x10 ft on foam beam 4x10 ft on foam beam    Rocker board m-l, a-p 30 ea 30 ea 30 ea     30 ea 30 ea 30 ea   Fwd and lateral step to bosu 15 ea 15 ea 15 ea      15 ea 15 ea   Ther Ex             Eccentric calf raise 2x10 over step 2x10 over step 15 ea over step          Pt edu on pathology, HEP, POC 3'  5'          SL calf press 3x10 105# " 3x10 105# 3x10 105#     3x12 75# 3x10 85# 3x10 #   Calf raises 10x       3x10 over step 2x20 over step 3x20 over step   Resisted inversion   tested          Toe raise        30x 2x20 30x   Bike for strength        nv 6' lvl 2 6' lvl 2   Heel walking        6x10 ft 6x10 ft    Ther Activity             Sit to stand        2x10 2x10 tested   Fwd and lat repetitive step   10 ea           Hip hike   10 ea          Fwd lunge        10 ea 10 ea no UE    Partial reverse lunge        10 ea     Gait Training                                       Modalities

## 2025-07-08 ENCOUNTER — TELEPHONE (OUTPATIENT)
Dept: UROLOGY | Facility: HOSPITAL | Age: 70
End: 2025-07-08

## 2025-07-08 NOTE — TELEPHONE ENCOUNTER
Spoke to patient regarding psa lab work. Patient stated he had completed with primary care physician. I did call his primary care and stated he had done in June and will be faxing to us. I gave them fax number to [facility].

## 2025-07-09 NOTE — PROGRESS NOTES
Name: Doug Vargas      : 1955      MRN: [id number]  Encounter Provider: Flako Vilchis MD  Encounter Date: 7/10/2025   Encounter department: [facility]  :  Assessment & Plan  History of needle biopsy of prostate with negative result         Elevated PSA  PSA 5.0  then 6.5 in , most recently PSA 5.76  Recommend PSA in 1 year given negative biopsy in     Orders:    PSA, total and free; Future    S/P deep brain stimulator placement         Obesity, morbid (HCC)               History of Present Illness   Doug Vargas is a 70 y.o. male who presents for follow up of elevated PSA  Unable to get MRI prostate due to deep brain stimulator  Negative biopsy in    No new symptoms      The following portions of the patient's history were reviewed and updated as appropriate: allergies, current medications, past family history, past medical history, past social history, past surgical history and problem list.      Review of Systems   Constitutional: Negative.    HENT: Negative.     Eyes: Negative.    Respiratory: Negative.     Cardiovascular: Negative.    Gastrointestinal: Negative.    Endocrine: Negative.    Genitourinary: Negative.    Musculoskeletal: Negative.    Skin: Negative.    Allergic/Immunologic: Negative.    Neurological: Negative.    Hematological: Negative.    Psychiatric/Behavioral: Negative.            Objective   There were no vitals taken for this visit.    Physical Exam  Vitals reviewed.   Constitutional:       General: He is not in acute distress.     Appearance: Normal appearance. He is well-developed. He is not ill-appearing, toxic-appearing or diaphoretic.   HENT:      Head: Normocephalic and atraumatic.      Nose: Nose normal.      Mouth/Throat:      Mouth: Mucous membranes are moist.     Eyes:      General: No scleral icterus.        Right eye: No discharge.         Left eye: No discharge.     Neck:      Thyroid: No thyromegaly.      Trachea: No  tracheal deviation.   Pulmonary:      Effort: Pulmonary effort is normal.   Chest:      Chest wall: No tenderness.   Abdominal:      General: There is no distension.      Palpations: There is no mass.      Tenderness: There is no abdominal tenderness. There is no guarding.      Hernia: No hernia is present.     Musculoskeletal:         General: No deformity or signs of injury. Normal range of motion.      Cervical back: Normal range of motion and neck supple.   Lymphadenopathy:      Cervical: No cervical adenopathy.     Skin:     General: Skin is dry.      Coloration: Skin is not jaundiced or pale.      Findings: No erythema.     Neurological:      Mental Status: He is alert and oriented to person, place, and time.      Cranial Nerves: No cranial nerve deficit.      Motor: No abnormal muscle tone.      Coordination: Coordination normal.     Psychiatric:         Mood and Affect: Mood normal.         Behavior: Behavior normal.         Thought Content: Thought content normal.         Judgment: Judgment normal.          Results   PSA 5.76 6/11/25  Lab Results   Component Value Date    PSA 5.0 (H) 04/05/2022    PSA 5.0 (H) 10/08/2021   PSA was 6.5 in 2023    Lab Results   Component Value Date    GLUCOSE 95 04/22/2014    CALCIUM 9.4 04/05/2022     04/22/2014    K 5.0 04/05/2022    CO2 29 04/05/2022     04/05/2022    BUN 18 04/05/2022    CREATININE 0.97 04/05/2022     Lab Results   Component Value Date    WBC 8.89 04/05/2022    HGB 15.2 04/05/2022    HCT 46.8 04/05/2022    MCV 99 (H) 04/05/2022     04/05/2022       Office Urine Dip  No results found for this or any previous visit (from the past hour).

## 2025-07-09 NOTE — PATIENT INSTRUCTIONS
PROSTATE CANCER SCREENING OVERVIEW    Prostate cancer screening involves testing for prostate cancer in men who have no symptoms of the disease. This testing can find cancer at an early stage. However, medical experts agree that prostate cancer screening should not be routinely ordered for all men and that screening can lead to both benefits and harms.    This article is designed to review the advantages and disadvantages of prostate cancer screening. You should talk with your health care provider to decide what is best in your individual situation.    WHAT IS PROSTATE CANCER?  Prostate cancer is a cancer of the prostate, a small gland in men that is located below the bladder and in front of the rectum (figure 1). The prostate produces fluid that helps carry sperm during ejaculation.  Although many men are diagnosed with prostate cancer, most of them do not die from their cancer. Prostate cancer often grows so slowly that many men die of other causes before they even develop symptoms of prostate cancer.    PROSTATE CANCER RISK FACTORS  Age -- All men are at risk for prostate cancer, but the risk greatly increases with older age. Prostate cancer is rarely found in men younger than 50 years old.    Ethnic background --  men develop prostate cancer more often than white and  men.  men also are more likely to die of prostate cancer than white or  men.    Family medical history -- Men who have a first-degree relative (a father or brother) with prostate cancer are more likely to develop the disease. Men with female relatives with breast cancer related to the breast cancer gene (BRCA) may also be more likely to develop prostate cancer.    Diet -- A diet high in animal fat or low in vegetables may increase a man's risk of prostate cancer.    PROSTATE CANCER SCREENING TESTS  Prostate cancer screening involves blood test that measures prostate-specific antigen  "(PSA).  Prostate-specific antigen (PSA) -- PSA is a protein produced by the prostate. The PSA test measures the amount of PSA in a sample of blood. Although many men with prostate cancer have an elevated PSA concentration, a high level does not necessarily mean there is a cancer.  The most common cause for an elevated PSA is benign prostatic hyperplasia (BPH), a noncancerous enlargement of the prostate. Other causes include prostate infection (prostatitis), trauma (bicycle riding), and sexual activity. You should avoid ejaculating or riding a bike for at least 48 hours before having a PSA test. (See \"Patient education: Benign prostatic hyperplasia (BPH) (Beyond the Basics)\".)    Rectal examination -- A rectal examination is sometimes recommended, along with measurement of the PSA, to screen for prostate cancer. However, studies have not shown that rectal examination is an effective screening test for prostate cancer when used alone.    If the PSA test is positive -- A positive PSA test is not a reason to panic; noncancerous conditions are the most common causes for an abnormal test, particularly for PSA tests. On the other hand, a positive test should not be ignored.    The first step in evaluating an elevated PSA is usually to repeat the test.  You should avoid ejaculating and riding a bike for at least 48 hours before repeating the test. If the PSA remains elevated, a prostate biopsy or other testing is usually recommended.    Prostate biopsy -- A prostate biopsy involves having a rectal ultrasound and use of a needle to obtain tissue samples from the prostate gland. The biopsy is usually performed in the office by a urologist (a doctor who specializes in treatment of urinary, bladder, and prostate issues). After the procedure, most men feel sore and you may see some blood in the urine or semen, this can last for up to 4-6 weeks. Biopsies can rarely cause serious infections. Sometimes biopsies are guided by " "magnetic resonance imaging (MRI).    PROS AND CONS OF PROSTATE CANCER SCREENING    There are a number of arguments for and against prostate cancer screening.    Arguments for screening -- Experts in favor of prostate cancer screening cite the following arguments:    ?Results from a large  study of prostate cancer screening found that men who had prostate-specific antigen (PSA) testing had a 20 percent lower chance of dying from prostate cancer after 13 years compared with men who did not have prostate cancer screening [1]. Men who had PSA testing also had a 30 percent lower chance of developing metastatic disease (cancer that has spread to other parts of the body) [2].  In another  study of prostate cancer screening, the mortality benefit was even larger to prostate cancer screening  (the [place] trial)    ?A substantial number of men die from prostate cancer every year and many more suffer from the complications of advanced disease.    ?For men with an aggressive prostate cancer, the best chance for curing it is by finding it at an early stage and then treating it with surgery or radiation. Studies have shown that men who have prostate cancer detected by PSA screening tend to have earlier-stage cancer than men who have a cancer detected by other means. (See \"Patient education: Treatment for advanced prostate cancer (Beyond the Basics)\" and \"Patient education: Prostate cancer treatment; stage I to III cancer (Beyond the Basics)\".)    ?The five-year survival for men who have prostate cancer confined to the prostate gland (early stage) is nearly 100 percent; this drops to 30 percent for men whose cancer has spread to other areas of the body. However, many early-stage cancers are not aggressive, and the five-year survival for those will be nearly 100 percent even without any treatment [3].    ?The available screening tests are not perfect, but they are easy to perform and have fair accuracy.  Arguments " against screening -- Other arguments have also been made against screening:    ?Even though the  study found a benefit of prostate cancer screening, PSA testing prevented only about one prostate cancer death for every 1000 screened men after 13 years [1]. Furthermore, 75 percent of men with an abnormal PSA who had a prostate biopsy did not have prostate cancer.  However, in the [facility] study, the number needed to screen and treat was much lower indicating that prostate cancer screening is ineffective screening measure which decreases the morbidity and mortality of prostate cancer.    ?Many prostate cancers detected with screening are unlikely to cause death or disability. Thus, a number of men will be diagnosed with cancer and potentially suffer the side effects of cancer treatment for cancers that never would have been found without prostate cancer screening. In other words, even if screening finds a cancer early, it is not clear in all cases that the cancer must be treated.    IS PROSTATE CANCER SCREENING RIGHT FOR ME?    The answer to this question is not the same for everyone. The table includes some questions you can ask yourself when weighing the potential risk and benefits of screening (table 1).  Professional organizations -- Major medical associations and societies, including the [place] Preventive Services Task Force [4], American Cancer Society [5], American Urological Association [6], and many  cancer societies, agree that men should discuss screening with their health care providers. Men should be informed about the benefits and risks of prostate cancer screening and treatment and make decisions that best reflect their personal values and preferences.  Age to first consider screening -- Screening discussions should begin at age 50 years for men at average risk for developing prostate cancer. Men with risk factors for prostate cancer (such as black men or men with a father or brother who had  prostate cancer) may want to begin screening discussions at age 40 to 45 years.  How often to perform screening -- Once screening begins, it should occur every two to four years (if continued testing is desired) and should include a PSA blood test.  Age to stop screening -- Guidelines suggest stopping screening after age 69, though some experts would continue offering screening to very healthy men beyond that age.  Screening not recommended -- Screening is generally not recommended for men whose life expectancy, or ability to undergo curative treatment, is limited by serious health problems. In these situations, the potential benefits of screening are outweighed by the likely harms.  WHERE TO GET MORE INFORMATION  Your healthcare provider is the best source of information for questions and concerns related to your medical problem.  This article will be updated as needed on our web site ([email or web address]). Related topics for patients, as well as selected articles written for healthcare professionals, are also available. Some of the most relevant are listed below.  Patient level information -- [employer] offers two types of patient education materials.  The Basics -- The Basics patient education pieces answer the four or five key questions a patient might have about a given condition. These articles are best for patients who want a general overview and who prefer short, easy-to-read materials.  Patient education: Prostate cancer screening (PSA tests) (The Basics)  Patient education: Prostate cancer (The Basics)  Patient education: Cancer screening (The Basics)  Patient education: Choosing treatment for low-risk localized prostate cancer (The Basics)  Beyond the Basics -- Beyond the Basics patient education pieces are longer, more sophisticated, and more detailed. These articles are best for patients who want in-depth information and are comfortable with some medical jargon.  Patient education: Treatment for  advanced prostate cancer (Beyond the Basics)  Patient education: Prostate cancer treatment; stage I to III cancer (Beyond the Basics)  Patient education: Benign prostatic hyperplasia (BPH) (Beyond the Basics)  Professional level information -- Professional level articles are designed to keep doctors and other health professionals up-to-date on the latest medical findings. These articles are thorough, long, and complex, and they contain multiple references to the research on which they are based. Professional level articles are best for people who are comfortable with a lot of medical terminology and who want to read the same materials their doctors are reading.  Measurement of prostate-specific antigen  Screening for prostate cancer  The following organizations also provide reliable health information.  ?[employer] [place]  0-374-4-CANCER  (www.cancer.gov/types/prostate)  ?American Society for Clinical Oncology (patient information website)  (www.cancer.net)  ?National Comprehensive Cancer Network (patient and caregiver information website)  (www.nccn.org/patients)  ?American Cancer Society  9-296-AZX-2345  (www.cancer.org)  ?National Library of Medicine  (www.medlineplus.gov/healthtopics.html)  ?[employer] Prostate Cancer Education and Support  (www.ustoo.org/Detection-PSA-And-BREANA)

## 2025-07-10 ENCOUNTER — OFFICE VISIT (OUTPATIENT)
Dept: UROLOGY | Facility: HOSPITAL | Age: 70
End: 2025-07-10
Payer: MEDICARE

## 2025-07-10 VITALS
HEIGHT: 73 IN | SYSTOLIC BLOOD PRESSURE: 116 MMHG | DIASTOLIC BLOOD PRESSURE: 80 MMHG | OXYGEN SATURATION: 96 % | HEART RATE: 67 BPM | BODY MASS INDEX: 36.44 KG/M2

## 2025-07-10 DIAGNOSIS — R97.20 ELEVATED PSA: ICD-10-CM

## 2025-07-10 DIAGNOSIS — Z98.890 HISTORY OF NEEDLE BIOPSY OF PROSTATE WITH NEGATIVE RESULT: Primary | ICD-10-CM

## 2025-07-10 DIAGNOSIS — E66.01 OBESITY, MORBID (HCC): ICD-10-CM

## 2025-07-10 DIAGNOSIS — Z96.89 S/P DEEP BRAIN STIMULATOR PLACEMENT: ICD-10-CM

## 2025-07-10 PROCEDURE — 99213 OFFICE O/P EST LOW 20 MIN: CPT | Performed by: UROLOGY

## 2025-07-10 NOTE — ASSESSMENT & PLAN NOTE
PSA 5.0 2021 then 6.5 in 2023, most recently PSA 5.76  Recommend PSA in 1 year given negative biopsy in 2021    Orders:    PSA, total and free; Future

## (undated) DEVICE — SUT MONOCRYL PLUS 4-0 PS-2 18 IN MCP496G

## (undated) DEVICE — BETHLEHEM UNIVERSAL MINOR GEN: Brand: CARDINAL HEALTH

## (undated) DEVICE — PROGRAMMER NEUROSTIM ACTIVA PC RECHARGEABLE DBS THERAPY

## (undated) DEVICE — GLOVE SRG BIOGEL ECLIPSE 8

## (undated) DEVICE — TUBING SUCTION 5MM X 12 FT

## (undated) DEVICE — 3M™ IOBAN™ 2 ANTIMICROBIAL INCISE DRAPE 6640EZ: Brand: IOBAN™ 2

## (undated) DEVICE — SCD SEQUENTIAL COMPRESSION COMFORT SLEEVE MEDIUM KNEE LENGTH: Brand: KENDALL SCD

## (undated) DEVICE — 3M™ TEGADERM™ TRANSPARENT FILM DRESSING FRAME STYLE, 1626W, 4 IN X 4-3/4 IN (10 CM X 12 CM), 50/CT 4CT/CASE: Brand: 3M™ TEGADERM™

## (undated) DEVICE — BETADINE SCRUB BRUSH

## (undated) DEVICE — PROGRAMMER PATIENT PERCEPT

## (undated) DEVICE — SPONGE PVP SCRUB WING STERILE

## (undated) DEVICE — NEEDLE 25G X 1 1/2

## (undated) DEVICE — GLOVE INDICATOR PI UNDERGLOVE SZ 8 BLUE

## (undated) DEVICE — PREP SURGICAL PURPREP 26ML

## (undated) DEVICE — 3M™ STERI-STRIP™ REINFORCED ADHESIVE SKIN CLOSURES, R1547, 1/2 IN X 4 IN (12 MM X 100 MM), 6 STRIPS/ENVELOPE: Brand: 3M™ STERI-STRIP™

## (undated) DEVICE — PENCIL ELECTROSURG E-Z CLEAN -0035H

## (undated) DEVICE — ANTIBACTERIAL VIOLET BRAIDED (POLYGLACTIN 910), SYNTHETIC ABSORBABLE SUTURE: Brand: COATED VICRYL

## (undated) DEVICE — STANDARD SURGICAL GOWN, L: Brand: CONVERTORS

## (undated) DEVICE — PAD GROUNDING ADULT

## (undated) DEVICE — ANTENNA PROGRAMMER PATIENT DBS

## (undated) DEVICE — GLOVE SRG BIOGEL 8

## (undated) DEVICE — DRAPE CAMERA/LASER

## (undated) DEVICE — 10FR FRAZIER SUCTION HANDLE: Brand: CARDINAL HEALTH

## (undated) DEVICE — MEDI-VAC YANK SUCT HNDL W/TPRD BULBOUS TIP: Brand: CARDINAL HEALTH

## (undated) DEVICE — TELFA NON-ADHERENT ABSORBENT DRESSING: Brand: TELFA

## (undated) DEVICE — GLOVE SRG BIOGEL 6.5

## (undated) DEVICE — GLOVE INDICATOR PI UNDERGLOVE SZ 8.5 BLUE

## (undated) DEVICE — INTENDED FOR TISSUE SEPARATION, AND OTHER PROCEDURES THAT REQUIRE A SHARP SURGICAL BLADE TO PUNCTURE OR CUT.: Brand: BARD-PARKER SAFETY BLADES SIZE 10, STERILE

## (undated) DEVICE — BULB SYRINGE,IRRIGATION WITH PROTECTIVE CAP: Brand: DOVER

## (undated) DEVICE — SUT SILK 2-0 SH 30 IN K833H

## (undated) DEVICE — DRESSING MEPILEX AG BORDER 4 X 4 IN

## (undated) DEVICE — PROXIMATE PLUS MD MULTI-DIRECTIONAL RELEASE SKIN STAPLERS CONTAINS 35 STAINLESS STEEL STAPLES APPROXIMATE CLOSED DIMENSIONS: 6.9MM X 3.9MM WIDE: Brand: PROXIMATE

## (undated) DEVICE — REM POLYHESIVE ADULT PATIENT RETURN ELECTRODE: Brand: VALLEYLAB

## (undated) DEVICE — VIAL DECANTER

## (undated) DEVICE — GLOVE INDICATOR PI UNDERGLOVE SZ 6.5 BLUE

## (undated) DEVICE — ASTOUND STANDARD SURGICAL GOWN, XXL: Brand: CONVERTORS